# Patient Record
Sex: MALE | Race: WHITE | NOT HISPANIC OR LATINO | Employment: OTHER | URBAN - METROPOLITAN AREA
[De-identification: names, ages, dates, MRNs, and addresses within clinical notes are randomized per-mention and may not be internally consistent; named-entity substitution may affect disease eponyms.]

---

## 2023-02-22 LAB
ANION GAP IN SER/PLAS: 11 MMOL/L (ref 10–20)
CALCIUM (MG/DL) IN SER/PLAS: 9.2 MG/DL (ref 8.6–10.3)
CARBON DIOXIDE, TOTAL (MMOL/L) IN SER/PLAS: 31 MMOL/L (ref 21–32)
CHLORIDE (MMOL/L) IN SER/PLAS: 101 MMOL/L (ref 98–107)
CREATININE (MG/DL) IN SER/PLAS: 1.21 MG/DL (ref 0.5–1.3)
ERYTHROCYTE DISTRIBUTION WIDTH (RATIO) BY AUTOMATED COUNT: 16.9 % (ref 11.5–14.5)
ERYTHROCYTE MEAN CORPUSCULAR HEMOGLOBIN CONCENTRATION (G/DL) BY AUTOMATED: 30.9 G/DL (ref 32–36)
ERYTHROCYTE MEAN CORPUSCULAR VOLUME (FL) BY AUTOMATED COUNT: 82 FL (ref 80–100)
ERYTHROCYTES (10*6/UL) IN BLOOD BY AUTOMATED COUNT: 4.93 X10E12/L (ref 4.5–5.9)
GFR MALE: 63 ML/MIN/1.73M2
GLUCOSE (MG/DL) IN SER/PLAS: 116 MG/DL (ref 74–99)
HEMATOCRIT (%) IN BLOOD BY AUTOMATED COUNT: 40.4 % (ref 41–52)
HEMOGLOBIN (G/DL) IN BLOOD: 12.5 G/DL (ref 13.5–17.5)
INR IN PPP BY COAGULATION ASSAY: 1 (ref 0.9–1.1)
LEUKOCYTES (10*3/UL) IN BLOOD BY AUTOMATED COUNT: 4.5 X10E9/L (ref 4.4–11.3)
PLATELETS (10*3/UL) IN BLOOD AUTOMATED COUNT: 182 X10E9/L (ref 150–450)
POTASSIUM (MMOL/L) IN SER/PLAS: 4 MMOL/L (ref 3.5–5.3)
PROTHROMBIN TIME (PT) IN PPP BY COAGULATION ASSAY: 11.6 SEC (ref 9.8–13.4)
SODIUM (MMOL/L) IN SER/PLAS: 139 MMOL/L (ref 136–145)
UREA NITROGEN (MG/DL) IN SER/PLAS: 15 MG/DL (ref 6–23)

## 2023-03-10 LAB
ALANINE AMINOTRANSFERASE (SGPT) (U/L) IN SER/PLAS: 21 U/L (ref 10–52)
ANION GAP IN SER/PLAS: 12 MMOL/L (ref 10–20)
CALCIUM (MG/DL) IN SER/PLAS: 9.4 MG/DL (ref 8.6–10.3)
CARBON DIOXIDE, TOTAL (MMOL/L) IN SER/PLAS: 29 MMOL/L (ref 21–32)
CHLORIDE (MMOL/L) IN SER/PLAS: 102 MMOL/L (ref 98–107)
CHOLESTEROL (MG/DL) IN SER/PLAS: 146 MG/DL (ref 0–199)
CHOLESTEROL IN HDL (MG/DL) IN SER/PLAS: 39.4 MG/DL
CHOLESTEROL/HDL RATIO: 3.7
CREATININE (MG/DL) IN SER/PLAS: 1.24 MG/DL (ref 0.5–1.3)
ESTIMATED AVERAGE GLUCOSE FOR HBA1C: 140 MG/DL
GFR MALE: 61 ML/MIN/1.73M2
GLUCOSE (MG/DL) IN SER/PLAS: 103 MG/DL (ref 74–99)
HEMOGLOBIN A1C/HEMOGLOBIN TOTAL IN BLOOD: 6.5 %
LDL: 62 MG/DL (ref 0–99)
NON HDL CHOLESTEROL: 107 MG/DL
POTASSIUM (MMOL/L) IN SER/PLAS: 4.1 MMOL/L (ref 3.5–5.3)
SODIUM (MMOL/L) IN SER/PLAS: 139 MMOL/L (ref 136–145)
THYROTROPIN (MIU/L) IN SER/PLAS BY DETECTION LIMIT <= 0.05 MIU/L: 1.87 MIU/L (ref 0.44–3.98)
TRIGLYCERIDE (MG/DL) IN SER/PLAS: 225 MG/DL (ref 0–149)
URATE (MG/DL) IN SER/PLAS: 5.2 MG/DL (ref 4–7.5)
UREA NITROGEN (MG/DL) IN SER/PLAS: 20 MG/DL (ref 6–23)
VLDL: 45 MG/DL (ref 0–40)

## 2023-08-21 PROBLEM — Z86.010 HISTORY OF COLONIC POLYPS: Status: ACTIVE | Noted: 2023-08-21

## 2023-08-21 PROBLEM — H35.30 MACULAR DEGENERATION: Status: ACTIVE | Noted: 2023-08-21

## 2023-08-21 PROBLEM — E11.9 DIABETES MELLITUS WITHOUT COMPLICATION (MULTI): Status: ACTIVE | Noted: 2023-08-21

## 2023-08-21 PROBLEM — E78.2 MIXED HYPERLIPIDEMIA: Status: ACTIVE | Noted: 2023-08-21

## 2023-08-21 PROBLEM — M15.9 PRIMARY OSTEOARTHRITIS INVOLVING MULTIPLE JOINTS: Status: ACTIVE | Noted: 2023-08-21

## 2023-08-21 PROBLEM — R13.10 DYSPHAGIA: Status: ACTIVE | Noted: 2023-08-21

## 2023-08-21 PROBLEM — M15.0 PRIMARY OSTEOARTHRITIS INVOLVING MULTIPLE JOINTS: Status: ACTIVE | Noted: 2023-08-21

## 2023-08-21 PROBLEM — N40.0 BENIGN PROSTATIC HYPERPLASIA WITHOUT URINARY OBSTRUCTION: Status: ACTIVE | Noted: 2023-08-21

## 2023-08-21 PROBLEM — I10 ESSENTIAL HYPERTENSION: Status: ACTIVE | Noted: 2023-08-21

## 2023-08-21 PROBLEM — E11.65 HYPERGLYCEMIA DUE TO TYPE 2 DIABETES MELLITUS (MULTI): Status: ACTIVE | Noted: 2023-08-21

## 2023-08-21 PROBLEM — G47.33 OBSTRUCTIVE SLEEP APNEA SYNDROME: Status: ACTIVE | Noted: 2023-08-21

## 2023-08-21 PROBLEM — G62.9 PERIPHERAL POLYNEUROPATHY: Status: ACTIVE | Noted: 2023-08-21

## 2023-08-21 PROBLEM — I35.0 AORTIC STENOSIS: Status: ACTIVE | Noted: 2023-08-21

## 2023-08-21 PROBLEM — E03.9 HYPOTHYROIDISM: Status: ACTIVE | Noted: 2023-08-21

## 2023-08-21 PROBLEM — K21.9 GASTROESOPHAGEAL REFLUX DISEASE WITHOUT ESOPHAGITIS: Status: ACTIVE | Noted: 2023-08-21

## 2023-08-21 PROBLEM — I38 VALVULAR HEART DISEASE: Status: ACTIVE | Noted: 2023-08-21

## 2023-08-21 PROBLEM — E66.01 MORBID OBESITY (MULTI): Status: ACTIVE | Noted: 2023-08-21

## 2023-08-21 PROBLEM — M46.92 CERVICAL SPONDYLITIS (CMS-HCC): Status: ACTIVE | Noted: 2023-08-21

## 2023-08-21 PROBLEM — M19.90 DEGENERATIVE JOINT DISEASE: Status: ACTIVE | Noted: 2023-08-21

## 2023-08-21 PROBLEM — E11.40 DIABETIC NEUROPATHY (MULTI): Status: ACTIVE | Noted: 2023-08-21

## 2023-08-21 PROBLEM — Z86.0100 HISTORY OF COLONIC POLYPS: Status: ACTIVE | Noted: 2023-08-21

## 2023-08-21 PROBLEM — M10.9 GOUT: Status: ACTIVE | Noted: 2023-08-21

## 2023-08-21 RX ORDER — ACETAMINOPHEN 325 MG/1
2 TABLET ORAL EVERY 6 HOURS PRN
COMMUNITY
Start: 2023-02-18 | End: 2024-03-21 | Stop reason: SDUPTHER

## 2023-08-21 RX ORDER — ALLOPURINOL 300 MG/1
1 TABLET ORAL DAILY
COMMUNITY
End: 2024-03-21 | Stop reason: SDUPTHER

## 2023-08-21 RX ORDER — OMEPRAZOLE 40 MG/1
1 CAPSULE, DELAYED RELEASE ORAL
COMMUNITY
End: 2024-03-21 | Stop reason: SDUPTHER

## 2023-08-21 RX ORDER — SEMAGLUTIDE 0.68 MG/ML
INJECTION, SOLUTION SUBCUTANEOUS
COMMUNITY
Start: 2023-04-19 | End: 2023-11-14 | Stop reason: ALTCHOICE

## 2023-08-21 RX ORDER — NAPROXEN SODIUM 220 MG/1
1 TABLET, FILM COATED ORAL DAILY
COMMUNITY
End: 2024-03-21 | Stop reason: SDUPTHER

## 2023-08-21 RX ORDER — CHOLECALCIFEROL (VITAMIN D3) 125 MCG
1 CAPSULE ORAL 2 TIMES DAILY PRN
COMMUNITY
End: 2024-02-12 | Stop reason: ALTCHOICE

## 2023-08-21 RX ORDER — HYDROCHLOROTHIAZIDE 12.5 MG/1
1 TABLET ORAL DAILY
COMMUNITY
End: 2024-03-21 | Stop reason: SDUPTHER

## 2023-08-21 RX ORDER — METFORMIN HYDROCHLORIDE 500 MG/1
1 TABLET, EXTENDED RELEASE ORAL DAILY
COMMUNITY
Start: 2013-06-17 | End: 2024-03-21 | Stop reason: SDUPTHER

## 2023-08-21 RX ORDER — LEVOTHYROXINE SODIUM 150 UG/1
1 TABLET ORAL
COMMUNITY
Start: 2020-07-10 | End: 2024-03-21 | Stop reason: SDUPTHER

## 2023-08-21 RX ORDER — NIFEDIPINE 60 MG/1
1 TABLET, FILM COATED, EXTENDED RELEASE ORAL DAILY
COMMUNITY
Start: 2013-06-17 | End: 2024-03-21 | Stop reason: SDUPTHER

## 2023-08-21 RX ORDER — HYDROCHLOROTHIAZIDE 25 MG/1
1 TABLET ORAL DAILY
COMMUNITY
End: 2023-11-14 | Stop reason: ALTCHOICE

## 2023-08-21 RX ORDER — DIPHENHYDRAMINE HCL 50 MG
1 CAPSULE ORAL NIGHTLY
COMMUNITY
End: 2024-03-21 | Stop reason: ALTCHOICE

## 2023-08-21 RX ORDER — CARBOXYMETHYLCELLULOSE SODIUM 10 MG/ML
1 GEL OPHTHALMIC 2 TIMES DAILY
COMMUNITY
End: 2024-02-29 | Stop reason: WASHOUT

## 2023-08-21 RX ORDER — SIMVASTATIN 10 MG/1
1 TABLET, FILM COATED ORAL DAILY
COMMUNITY
End: 2024-03-21 | Stop reason: SDUPTHER

## 2023-09-14 ENCOUNTER — HOSPITAL ENCOUNTER (OUTPATIENT)
Dept: DATA CONVERSION | Facility: HOSPITAL | Age: 73
Discharge: HOME | End: 2023-09-14
Payer: MEDICARE

## 2023-09-14 DIAGNOSIS — N40.0 BENIGN PROSTATIC HYPERPLASIA WITHOUT LOWER URINARY TRACT SYMPTOMS: ICD-10-CM

## 2023-09-14 DIAGNOSIS — E03.9 HYPOTHYROIDISM, UNSPECIFIED: ICD-10-CM

## 2023-09-14 DIAGNOSIS — Z01.89 ENCOUNTER FOR OTHER SPECIFIED SPECIAL EXAMINATIONS: ICD-10-CM

## 2023-09-14 DIAGNOSIS — E55.9 VITAMIN D DEFICIENCY, UNSPECIFIED: ICD-10-CM

## 2023-09-14 DIAGNOSIS — E78.2 MIXED HYPERLIPIDEMIA: ICD-10-CM

## 2023-09-14 DIAGNOSIS — Z87.39 PERSONAL HISTORY OF OTHER DISEASES OF THE MUSCULOSKELETAL SYSTEM AND CONNECTIVE TISSUE: ICD-10-CM

## 2023-09-14 DIAGNOSIS — E11.65 TYPE 2 DIABETES MELLITUS WITH HYPERGLYCEMIA (MULTI): ICD-10-CM

## 2023-09-14 LAB
25(OH)D3 SERPL-MCNC: 24 NG/ML (ref 31–100)
ALBUMIN SERPL-MCNC: 4.3 GM/DL (ref 3.5–5)
ALBUMIN/GLOB SERPL: 1.6 RATIO (ref 1.5–3)
ALP BLD-CCNC: 102 U/L (ref 35–125)
ALT SERPL-CCNC: 20 U/L (ref 5–40)
ANION GAP SERPL CALCULATED.3IONS-SCNC: 12 MMOL/L (ref 0–19)
APPEARANCE PLAS: CLEAR
AST SERPL-CCNC: 23 U/L (ref 5–40)
BASOPHILS # BLD AUTO: 0.05 K/UL (ref 0–0.22)
BASOPHILS NFR BLD AUTO: 0.9 % (ref 0–1)
BILIRUB DIRECT SERPL-MCNC: 0.2 MG/DL (ref 0–0.2)
BILIRUB INDIRECT SERPL-MCNC: 0.3 MG/DL (ref 0–0.8)
BILIRUB SERPL-MCNC: 0.5 MG/DL (ref 0.1–1.2)
BUN SERPL-MCNC: 18 MG/DL (ref 8–25)
BUN/CREAT SERPL: 15 RATIO (ref 8–21)
CALCIUM SERPL-MCNC: 9.7 MG/DL (ref 8.5–10.4)
CHLORIDE SERPL-SCNC: 104 MMOL/L (ref 97–107)
CHOLEST SERPL-MCNC: 158 MG/DL (ref 133–200)
CHOLEST/HDLC SERPL: 3.9 RATIO
CO2 SERPL-SCNC: 27 MMOL/L (ref 24–31)
COLOR SPUN FLD: YELLOW
CREAT SERPL-MCNC: 1.2 MG/DL (ref 0.4–1.6)
CREAT UR-MCNC: 43.6 MG/DL
DEPRECATED RDW RBC AUTO: 45.1 FL (ref 37–54)
DIFFERENTIAL METHOD BLD: ABNORMAL
EOSINOPHIL # BLD AUTO: 0.15 K/UL (ref 0–0.45)
EOSINOPHIL NFR BLD: 2.8 % (ref 0–3)
ERYTHROCYTE [DISTWIDTH] IN BLOOD BY AUTOMATED COUNT: 14.7 % (ref 11.7–15)
FASTING STATUS PATIENT QL REPORTED: ABNORMAL
GFR SERPL CREATININE-BSD FRML MDRD: 64 ML/MIN/1.73 M2
GLOBULIN SER-MCNC: 2.7 G/DL (ref 1.9–3.7)
GLUCOSE SERPL-MCNC: 119 MG/DL (ref 65–99)
HBA1C MFR BLD: 6.5 % (ref 4–6)
HCT VFR BLD AUTO: 43.8 % (ref 41–50)
HDLC SERPL-MCNC: 41 MG/DL
HGB BLD-MCNC: 14.2 GM/DL (ref 13.5–16.5)
IMM GRANULOCYTES # BLD AUTO: 0.01 K/UL (ref 0–0.1)
LDLC SERPL CALC-MCNC: 84 MG/DL (ref 65–130)
LYMPHOCYTES # BLD AUTO: 0.74 K/UL (ref 1.2–3.2)
LYMPHOCYTES NFR BLD MANUAL: 13.7 % (ref 20–40)
MCH RBC QN AUTO: 26.9 PG (ref 26–34)
MCHC RBC AUTO-ENTMCNC: 32.4 % (ref 31–37)
MCV RBC AUTO: 83.1 FL (ref 80–100)
MICROALBUMIN UR-MCNC: 12 MG/L (ref 0–23)
MICROALBUMIN/CREAT UR: 27.5 MG/G (ref 0–30)
MONOCYTES # BLD AUTO: 0.65 K/UL (ref 0–0.8)
MONOCYTES NFR BLD MANUAL: 12 % (ref 0–8)
NEUTROPHILS # BLD AUTO: 3.82 K/UL
NEUTROPHILS # BLD AUTO: 3.82 K/UL (ref 1.8–7.7)
NEUTROPHILS.IMMATURE NFR BLD: 0.2 % (ref 0–1)
NEUTS SEG NFR BLD: 70.4 % (ref 50–70)
NRBC BLD-RTO: 0 /100 WBC
PLATELET # BLD AUTO: 194 K/UL (ref 150–450)
PMV BLD AUTO: 10.5 CU (ref 7–12.6)
POTASSIUM SERPL-SCNC: 3.8 MMOL/L (ref 3.4–5.1)
PROT SERPL-MCNC: 7 G/DL (ref 5.9–7.9)
PSA SERPL-MCNC: 6.1 NG/ML (ref 0–4.1)
RBC # BLD AUTO: 5.27 M/UL (ref 4.5–5.5)
SODIUM SERPL-SCNC: 143 MMOL/L (ref 133–145)
TRIGL SERPL-MCNC: 165 MG/DL (ref 40–150)
TSH SERPL DL<=0.05 MIU/L-ACNC: 0.4 MIU/L (ref 0.27–4.2)
URATE SERPL-MCNC: 5.2 MG/DL (ref 3.6–7.7)
WBC # BLD AUTO: 5.4 K/UL (ref 4.5–11)

## 2023-10-16 ENCOUNTER — LAB (OUTPATIENT)
Dept: LAB | Facility: LAB | Age: 73
End: 2023-10-16
Payer: MEDICARE

## 2023-10-16 DIAGNOSIS — R97.20 ELEVATED PROSTATE SPECIFIC ANTIGEN (PSA): Primary | ICD-10-CM

## 2023-10-16 PROCEDURE — 84153 ASSAY OF PSA TOTAL: CPT

## 2023-10-16 PROCEDURE — 36415 COLL VENOUS BLD VENIPUNCTURE: CPT

## 2023-10-17 LAB — PSA SERPL-MCNC: 6.43 NG/ML

## 2023-11-14 ENCOUNTER — OFFICE VISIT (OUTPATIENT)
Dept: ORTHOPEDIC SURGERY | Facility: CLINIC | Age: 73
End: 2023-11-14
Payer: MEDICARE

## 2023-11-14 DIAGNOSIS — M17.12 ARTHRITIS OF LEFT KNEE: Primary | ICD-10-CM

## 2023-11-14 RX ORDER — TRIAMCINOLONE ACETONIDE 40 MG/ML
40 INJECTION, SUSPENSION INTRA-ARTICULAR; INTRAMUSCULAR
Status: COMPLETED | OUTPATIENT
Start: 2023-11-14 | End: 2023-11-14

## 2023-11-14 RX ORDER — BUPIVACAINE HYDROCHLORIDE 2.5 MG/ML
1 INJECTION, SOLUTION INFILTRATION; PERINEURAL
Status: COMPLETED | OUTPATIENT
Start: 2023-11-14 | End: 2023-11-14

## 2023-11-14 RX ADMIN — TRIAMCINOLONE ACETONIDE 40 MG: 40 INJECTION, SUSPENSION INTRA-ARTICULAR; INTRAMUSCULAR at 08:48

## 2023-11-14 RX ADMIN — BUPIVACAINE HYDROCHLORIDE 1 ML: 2.5 INJECTION, SOLUTION INFILTRATION; PERINEURAL at 08:48

## 2023-11-14 ASSESSMENT — ENCOUNTER SYMPTOMS
DEPRESSION: 0
OCCASIONAL FEELINGS OF UNSTEADINESS: 1
LOSS OF SENSATION IN FEET: 0
KNEE DEFORMITY: 1

## 2023-11-14 ASSESSMENT — PATIENT HEALTH QUESTIONNAIRE - PHQ9
SUM OF ALL RESPONSES TO PHQ9 QUESTIONS 1 & 2: 0
2. FEELING DOWN, DEPRESSED OR HOPELESS: NOT AT ALL
1. LITTLE INTEREST OR PLEASURE IN DOING THINGS: NOT AT ALL

## 2023-11-14 ASSESSMENT — PAIN - FUNCTIONAL ASSESSMENT: PAIN_FUNCTIONAL_ASSESSMENT: 0-10

## 2023-11-14 ASSESSMENT — PAIN DESCRIPTION - DESCRIPTORS: DESCRIPTORS: ACHING;DISCOMFORT

## 2023-11-14 ASSESSMENT — PAIN SCALES - GENERAL: PAINLEVEL_OUTOF10: 8

## 2023-11-14 NOTE — PROGRESS NOTES
Subjective    Patient ID: Jerald Marks is a 73 y.o. male.    Chief Complaint: Pain of the Left Knee (OSTEOARTHRITIS, REQUESTING INJECTION, LAST INJECTION 6/29/2023)         Left Knee       This 73-year-old male is complaining of left knee pain.  He has a history of degenerative arthritis involving the medial compartment of the left knee.  He has had a previous injection in June 2023 which helped him significantly.  He is here requesting another injection.  He denies any interim issues.  He apparently is prediabetic and I did discuss the implications of steroid injections.  Objective   Ortho Exam  On examination he is noted to be a somewhat overweight male.  He is walking with no assistive devices.  Examination of the left knee reveals there to be minimal effusion.  There is pain to palpation over the medial joint line.  There is no varus or valgus instability noted.  He has good range of motion with full extension and flexion to 120 degrees.  Image Results:  XR knee left 1-2 views  PROCEDURE:         KNEE LT 1 OR 2 VIEW - OXR  0181  REASON FOR EXAM: pain    RESULT: MRN: [id number]  Patient Name: JERALD MARKS    STUDY:  KNEE LT 1 OR 2 VIEW; 6/29/2023 9:12 am    INDICATION:  pain.    COMPARISON:  None    ACCESSION NUMBER(S):  [id number]    ORDERING CLINICIAN:  MAKSIM HORNE    TECHNIQUE:  Two views .    FINDINGS:  No fracture or dislocation. There is a small suprapatellar effusion. A  fabella is noted. There is no periostitis. No abnormal sclerosis is seen.  No erosions. No suggestion of osteopenia. Mild-to-moderate quadriceps  enthesopathy and origin and insertion patellar enthesopathy is seen. Mild  surface osteophytosis of the patella is seen. There is mild medial  compartment narrowing with minimal osteophytosis and minimal lateral  compartment narrowing with lateral tibial plateau osteophytosis. Soft tissue  edema anterior to the patella and patellar tendon is seen.    IMPRESSION:  Degenerative changes of the knee  without acute osseous abnormality.  Dictation workstation:   [id number]    Original Interpreting Physician:   GINA MELARA MD  Original Transcribed by/Date: MMODAL Jun 29 2023  9:05A  Original Electronically Signed by/Date: GINA MELARA MD Jun 29 2023 10:13A    Addendum Interpreting Physician:  Addendum Transcribed by/Date: NO ADDENDUM  Addendum Electronically Signed by/Date:  ECHOCARDIOGRAM  Ordered by an unspecified provider.      Assessment/Plan   Encounter Diagnoses:  Arthritis of left knee        Patient ID: Jerald Sandoval is a 73 y.o. male.    L Inj/Asp: L knee on 11/14/2023 8:48 AM  Indications: pain  Details: 25 G needle, anteromedial approach  Medications: 40 mg triamcinolone acetonide 40 mg/mL; 1 mL BUPivacaine HCl 0.25 % (2.5 mg/mL)  Outcome: tolerated well, no immediate complications  Procedure, treatment alternatives, risks and benefits explained, specific risks discussed. Consent was given by the patient. Immediately prior to procedure a time out was called to verify the correct patient, procedure, equipment, support staff and site/side marked as required. Patient was prepped and draped in the usual sterile fashion.       I recommended low impact exercise as tolerated.  I again discussed the fact that we could do these injections every 3 months if needed.  He is to return on an as-needed basis.

## 2023-11-24 ENCOUNTER — LAB (OUTPATIENT)
Dept: LAB | Facility: LAB | Age: 73
End: 2023-11-24
Payer: MEDICARE

## 2023-11-24 DIAGNOSIS — R97.20 ELEVATED PROSTATE SPECIFIC ANTIGEN (PSA): Primary | ICD-10-CM

## 2023-11-24 DIAGNOSIS — R97.20 ELEVATED PROSTATE SPECIFIC ANTIGEN (PSA): ICD-10-CM

## 2023-11-24 LAB
ANION GAP SERPL CALC-SCNC: 11 MMOL/L (ref 10–20)
BUN SERPL-MCNC: 19 MG/DL (ref 6–23)
CALCIUM SERPL-MCNC: 9.7 MG/DL (ref 8.6–10.3)
CHLORIDE SERPL-SCNC: 103 MMOL/L (ref 98–107)
CO2 SERPL-SCNC: 30 MMOL/L (ref 21–32)
CREAT SERPL-MCNC: 1.24 MG/DL (ref 0.5–1.3)
GFR SERPL CREATININE-BSD FRML MDRD: 61 ML/MIN/1.73M*2
GLUCOSE SERPL-MCNC: 110 MG/DL (ref 74–99)
POTASSIUM SERPL-SCNC: 4.2 MMOL/L (ref 3.5–5.3)
SODIUM SERPL-SCNC: 140 MMOL/L (ref 136–145)

## 2023-11-24 PROCEDURE — 36415 COLL VENOUS BLD VENIPUNCTURE: CPT

## 2023-11-24 PROCEDURE — 80048 BASIC METABOLIC PNL TOTAL CA: CPT

## 2023-11-27 ENCOUNTER — OFFICE VISIT (OUTPATIENT)
Dept: CARDIOLOGY | Facility: CLINIC | Age: 73
End: 2023-11-27
Payer: MEDICARE

## 2023-11-27 VITALS
SYSTOLIC BLOOD PRESSURE: 136 MMHG | OXYGEN SATURATION: 96 % | WEIGHT: 292.8 LBS | BODY MASS INDEX: 42.01 KG/M2 | DIASTOLIC BLOOD PRESSURE: 66 MMHG | HEART RATE: 86 BPM

## 2023-11-27 DIAGNOSIS — I10 ESSENTIAL HYPERTENSION: ICD-10-CM

## 2023-11-27 DIAGNOSIS — N40.0 BENIGN PROSTATIC HYPERPLASIA WITHOUT URINARY OBSTRUCTION: ICD-10-CM

## 2023-11-27 DIAGNOSIS — E78.2 MIXED HYPERLIPIDEMIA: ICD-10-CM

## 2023-11-27 DIAGNOSIS — I38 VALVULAR HEART DISEASE: Primary | ICD-10-CM

## 2023-11-27 PROBLEM — Z95.2 HISTORY OF TRANSCATHETER AORTIC VALVE REPLACEMENT (TAVR): Status: ACTIVE | Noted: 2023-11-27

## 2023-11-27 PROCEDURE — 99213 OFFICE O/P EST LOW 20 MIN: CPT | Performed by: INTERNAL MEDICINE

## 2023-11-27 PROCEDURE — 1159F MED LIST DOCD IN RCRD: CPT | Performed by: INTERNAL MEDICINE

## 2023-11-27 PROCEDURE — 1036F TOBACCO NON-USER: CPT | Performed by: INTERNAL MEDICINE

## 2023-11-27 PROCEDURE — 3075F SYST BP GE 130 - 139MM HG: CPT | Performed by: INTERNAL MEDICINE

## 2023-11-27 PROCEDURE — 3008F BODY MASS INDEX DOCD: CPT | Performed by: INTERNAL MEDICINE

## 2023-11-27 PROCEDURE — 1126F AMNT PAIN NOTED NONE PRSNT: CPT | Performed by: INTERNAL MEDICINE

## 2023-11-27 PROCEDURE — 3044F HG A1C LEVEL LT 7.0%: CPT | Performed by: INTERNAL MEDICINE

## 2023-11-27 PROCEDURE — 3078F DIAST BP <80 MM HG: CPT | Performed by: INTERNAL MEDICINE

## 2023-11-27 PROCEDURE — 1160F RVW MEDS BY RX/DR IN RCRD: CPT | Performed by: INTERNAL MEDICINE

## 2023-11-27 ASSESSMENT — PAIN SCALES - GENERAL: PAINLEVEL: 0-NO PAIN

## 2023-11-27 ASSESSMENT — ENCOUNTER SYMPTOMS
ENDOCRINE NEGATIVE: 1
FREQUENCY: 1
RESPIRATORY NEGATIVE: 1
MUSCULOSKELETAL NEGATIVE: 1
CONSTITUTIONAL NEGATIVE: 1
GASTROINTESTINAL NEGATIVE: 1
EYES NEGATIVE: 1
NEUROLOGICAL NEGATIVE: 1

## 2023-11-27 NOTE — PROGRESS NOTES
Subjective      Chief Complaint   Patient presents with    Follow-up    Cardiac Valve Problem          He has a history of having a TAVR in February 2023.  Last echocardiogram showed normal ejection fraction.  He is feeling well and is active.  He is not complaining of chest discomfort.  NO PND or orthopnea.  He does not complain of palpitations. The legs will swell and are better in the morning. He says his energy level is about the same as before the TAVR.  The BP is doing well  The chol was done in Sept and is low           Review of Systems   Constitutional: Negative.   HENT: Negative.     Eyes: Negative.    Respiratory: Negative.     Endocrine: Negative.    Skin: Negative.    Musculoskeletal: Negative.    Gastrointestinal: Negative.    Genitourinary:  Positive for frequency.   Neurological: Negative.    All other systems reviewed and are negative.       History reviewed. No pertinent surgical history.     Active Ambulatory Problems     Diagnosis Date Noted    Aortic stenosis 08/21/2023    Benign prostatic hyperplasia without urinary obstruction 08/21/2023    Cervical spondylitis (CMS/HCC) 08/21/2023    Degenerative joint disease 08/21/2023    Diabetes mellitus without complication (CMS/HCC) 08/21/2023    Diabetic neuropathy (CMS/HCC) 08/21/2023    Dysphagia 08/21/2023    Essential hypertension 08/21/2023    Gastroesophageal reflux disease without esophagitis 08/21/2023    Gout 08/21/2023    History of colonic polyps 08/21/2023    Hyperglycemia due to type 2 diabetes mellitus (CMS/HCC) 08/21/2023    Hypothyroidism 08/21/2023    Macular degeneration 08/21/2023    Mixed hyperlipidemia 08/21/2023    Morbid obesity (CMS/HCC) 08/21/2023    Peripheral polyneuropathy 08/21/2023    Primary osteoarthritis involving multiple joints 08/21/2023    Obstructive sleep apnea syndrome 08/21/2023    Valvular heart disease 08/21/2023    History of transcatheter aortic valve replacement (TAVR) 11/27/2023     Resolved Ambulatory  "Problems     Diagnosis Date Noted    No Resolved Ambulatory Problems     No Additional Past Medical History        Visit Vitals  /66   Pulse 86   Wt 133 kg (292 lb 12.8 oz)   SpO2 96%   BMI 42.01 kg/m²   Smoking Status Never   BSA 2.56 m²        Objective     Constitutional:       Appearance: Healthy appearance.   Eyes:      Pupils: Pupils are equal, round, and reactive to light.   Neck:      Vascular: JVD normal.   Pulmonary:      Breath sounds: Normal breath sounds.   Cardiovascular:      PMI at left midclavicular line. Normal rate. Regular rhythm. Normal S1. Normal S2.       Murmurs: There is a grade 1/6 systolic murmur.      No gallop.  No click. No rub.   Pulses:     Intact distal pulses.   Edema:     Peripheral edema absent.   Abdominal:      Palpations: Abdomen is soft.      Tenderness: There is no abdominal tenderness.   Musculoskeletal:      Extremities: No clubbing present.Skin:     General: Skin is warm and dry.   Neurological:      General: No focal deficit present.            Lab Review:         Lab Results   Component Value Date    CHOL 158 09/14/2023    CHOL 146 03/10/2023    CHOL 164 09/06/2022     Lab Results   Component Value Date    HDL 41 09/14/2023    HDL 39.4 (A) 03/10/2023    HDL 44 09/06/2022     Lab Results   Component Value Date    LDLCALC 84 09/14/2023    LDLCALC 77 09/06/2022    LDLCALC 81 02/28/2022     Lab Results   Component Value Date    TRIG 165 (H) 09/14/2023    TRIG 225 (H) 03/10/2023    TRIG 214 (H) 09/06/2022     No components found for: \"CHOLHDL\"     Assessment/Plan     Valvular heart disease  TAVR is doing well and no angina and no CHF.  The valve was replace in Feb and is doing well.    Essential hypertension  The chol is doing well    Mixed hyperlipidemia  Has been controlled    Benign prostatic hyperplasia without urinary obstruction  The PSA is up and will have an MRI by Carlos next week     "

## 2023-11-29 DIAGNOSIS — R97.20 ELEVATED PROSTATE SPECIFIC ANTIGEN (PSA): Primary | ICD-10-CM

## 2023-12-01 ENCOUNTER — HOSPITAL ENCOUNTER (OUTPATIENT)
Dept: RADIOLOGY | Facility: HOSPITAL | Age: 73
Discharge: HOME | End: 2023-12-01
Payer: MEDICARE

## 2023-12-01 DIAGNOSIS — Z03.823 ENCOUNTER FOR OBSERVATION FOR SUSPECTED INSERTED (INJECTED) FOREIGN BODY RULED OUT: ICD-10-CM

## 2023-12-01 DIAGNOSIS — R97.20 ELEVATED PROSTATE SPECIFIC ANTIGEN (PSA): ICD-10-CM

## 2023-12-01 PROCEDURE — 2550000001 HC RX 255 CONTRASTS: Performed by: UROLOGY

## 2023-12-01 PROCEDURE — 72197 MRI PELVIS W/O & W/DYE: CPT

## 2023-12-01 PROCEDURE — A9575 INJ GADOTERATE MEGLUMI 0.1ML: HCPCS | Performed by: UROLOGY

## 2023-12-01 PROCEDURE — 73552 X-RAY EXAM OF FEMUR 2/>: CPT | Mod: RT

## 2023-12-01 RX ORDER — GADOTERATE MEGLUMINE 376.9 MG/ML
28 INJECTION INTRAVENOUS
Status: COMPLETED | OUTPATIENT
Start: 2023-12-01 | End: 2023-12-01

## 2023-12-01 RX ADMIN — GADOTERATE MEGLUMINE 28 ML: 376.9 INJECTION INTRAVENOUS at 11:56

## 2023-12-27 ENCOUNTER — ANESTHESIA EVENT (OUTPATIENT)
Dept: OPERATING ROOM | Facility: HOSPITAL | Age: 73
End: 2023-12-27
Payer: MEDICARE

## 2023-12-29 ENCOUNTER — HOSPITAL ENCOUNTER (OUTPATIENT)
Facility: HOSPITAL | Age: 73
Setting detail: OUTPATIENT SURGERY
Discharge: HOME | End: 2023-12-29
Attending: UROLOGY | Admitting: UROLOGY
Payer: MEDICARE

## 2023-12-29 ENCOUNTER — ANESTHESIA (OUTPATIENT)
Dept: OPERATING ROOM | Facility: HOSPITAL | Age: 73
End: 2023-12-29
Payer: MEDICARE

## 2023-12-29 VITALS
DIASTOLIC BLOOD PRESSURE: 67 MMHG | HEART RATE: 75 BPM | RESPIRATION RATE: 15 BRPM | WEIGHT: 300.49 LBS | HEIGHT: 70 IN | BODY MASS INDEX: 43.02 KG/M2 | TEMPERATURE: 96.8 F | SYSTOLIC BLOOD PRESSURE: 121 MMHG | OXYGEN SATURATION: 99 %

## 2023-12-29 DIAGNOSIS — R97.20 ELEVATED PSA: Primary | ICD-10-CM

## 2023-12-29 PROBLEM — K27.9 PUD (PEPTIC ULCER DISEASE): Status: ACTIVE | Noted: 2023-12-29

## 2023-12-29 LAB — GLUCOSE BLD MANUAL STRIP-MCNC: 118 MG/DL (ref 74–99)

## 2023-12-29 PROCEDURE — 82947 ASSAY GLUCOSE BLOOD QUANT: CPT

## 2023-12-29 PROCEDURE — G0416 PROSTATE BIOPSY, ANY MTHD: HCPCS | Mod: TC,BEALAB,WESLAB | Performed by: UROLOGY

## 2023-12-29 PROCEDURE — G0416 PROSTATE BIOPSY, ANY MTHD: HCPCS | Performed by: PATHOLOGY

## 2023-12-29 PROCEDURE — 7100000009 HC PHASE TWO TIME - INITIAL BASE CHARGE: Performed by: UROLOGY

## 2023-12-29 PROCEDURE — 2500000005 HC RX 250 GENERAL PHARMACY W/O HCPCS: Performed by: ANESTHESIOLOGY

## 2023-12-29 PROCEDURE — 3700000001 HC GENERAL ANESTHESIA TIME - INITIAL BASE CHARGE: Performed by: UROLOGY

## 2023-12-29 PROCEDURE — 2720000007 HC OR 272 NO HCPCS: Performed by: UROLOGY

## 2023-12-29 PROCEDURE — 88305 TISSUE EXAM BY PATHOLOGIST: CPT | Mod: TC,SUR,BEALAB,WESLAB | Performed by: UROLOGY

## 2023-12-29 PROCEDURE — 7100000002 HC RECOVERY ROOM TIME - EACH INCREMENTAL 1 MINUTE: Performed by: UROLOGY

## 2023-12-29 PROCEDURE — 3600000004 HC OR TIME - INITIAL BASE CHARGE - PROCEDURE LEVEL FOUR: Performed by: UROLOGY

## 2023-12-29 PROCEDURE — 2500000004 HC RX 250 GENERAL PHARMACY W/ HCPCS (ALT 636 FOR OP/ED): Performed by: ANESTHESIOLOGY

## 2023-12-29 PROCEDURE — 3700000002 HC GENERAL ANESTHESIA TIME - EACH INCREMENTAL 1 MINUTE: Performed by: UROLOGY

## 2023-12-29 PROCEDURE — 88344 IMHCHEM/IMCYTCHM EA MLT ANTB: CPT | Mod: TC,SUR,BEALAB,WESLAB | Performed by: UROLOGY

## 2023-12-29 PROCEDURE — 2500000004 HC RX 250 GENERAL PHARMACY W/ HCPCS (ALT 636 FOR OP/ED): Performed by: UROLOGY

## 2023-12-29 PROCEDURE — 88344 IMHCHEM/IMCYTCHM EA MLT ANTB: CPT | Performed by: PATHOLOGY

## 2023-12-29 PROCEDURE — 7100000001 HC RECOVERY ROOM TIME - INITIAL BASE CHARGE: Performed by: UROLOGY

## 2023-12-29 PROCEDURE — 7100000010 HC PHASE TWO TIME - EACH INCREMENTAL 1 MINUTE: Performed by: UROLOGY

## 2023-12-29 PROCEDURE — 3600000009 HC OR TIME - EACH INCREMENTAL 1 MINUTE - PROCEDURE LEVEL FOUR: Performed by: UROLOGY

## 2023-12-29 RX ORDER — FENTANYL CITRATE 50 UG/ML
50 INJECTION, SOLUTION INTRAMUSCULAR; INTRAVENOUS EVERY 5 MIN PRN
Status: DISCONTINUED | OUTPATIENT
Start: 2023-12-29 | End: 2023-12-29 | Stop reason: HOSPADM

## 2023-12-29 RX ORDER — CIPROFLOXACIN 2 MG/ML
400 INJECTION, SOLUTION INTRAVENOUS ONCE
Status: COMPLETED | OUTPATIENT
Start: 2023-12-29 | End: 2023-12-29

## 2023-12-29 RX ORDER — PROPOFOL 10 MG/ML
INJECTION, EMULSION INTRAVENOUS AS NEEDED
Status: DISCONTINUED | OUTPATIENT
Start: 2023-12-29 | End: 2023-12-29

## 2023-12-29 RX ORDER — ONDANSETRON HYDROCHLORIDE 2 MG/ML
INJECTION, SOLUTION INTRAVENOUS AS NEEDED
Status: DISCONTINUED | OUTPATIENT
Start: 2023-12-29 | End: 2023-12-29

## 2023-12-29 RX ORDER — FENTANYL CITRATE 50 UG/ML
INJECTION, SOLUTION INTRAMUSCULAR; INTRAVENOUS AS NEEDED
Status: DISCONTINUED | OUTPATIENT
Start: 2023-12-29 | End: 2023-12-29

## 2023-12-29 RX ORDER — FENTANYL CITRATE 50 UG/ML
25 INJECTION, SOLUTION INTRAMUSCULAR; INTRAVENOUS EVERY 5 MIN PRN
Status: DISCONTINUED | OUTPATIENT
Start: 2023-12-29 | End: 2023-12-29 | Stop reason: HOSPADM

## 2023-12-29 RX ORDER — LIDOCAINE HYDROCHLORIDE 10 MG/ML
INJECTION, SOLUTION EPIDURAL; INFILTRATION; INTRACAUDAL; PERINEURAL AS NEEDED
Status: DISCONTINUED | OUTPATIENT
Start: 2023-12-29 | End: 2023-12-29

## 2023-12-29 RX ORDER — MIDAZOLAM HYDROCHLORIDE 1 MG/ML
INJECTION, SOLUTION INTRAMUSCULAR; INTRAVENOUS AS NEEDED
Status: DISCONTINUED | OUTPATIENT
Start: 2023-12-29 | End: 2023-12-29

## 2023-12-29 RX ORDER — SULFAMETHOXAZOLE AND TRIMETHOPRIM 800; 160 MG/1; MG/1
1 TABLET ORAL 2 TIMES DAILY
Qty: 4 TABLET | Refills: 0 | Status: SHIPPED | OUTPATIENT
Start: 2023-12-29 | End: 2024-02-12 | Stop reason: ALTCHOICE

## 2023-12-29 RX ORDER — SODIUM CHLORIDE, SODIUM LACTATE, POTASSIUM CHLORIDE, CALCIUM CHLORIDE 600; 310; 30; 20 MG/100ML; MG/100ML; MG/100ML; MG/100ML
100 INJECTION, SOLUTION INTRAVENOUS CONTINUOUS
Status: DISCONTINUED | OUTPATIENT
Start: 2023-12-29 | End: 2023-12-29 | Stop reason: HOSPADM

## 2023-12-29 RX ADMIN — FENTANYL CITRATE 50 MCG: 50 INJECTION INTRAMUSCULAR; INTRAVENOUS at 08:34

## 2023-12-29 RX ADMIN — ONDANSETRON 4 MG: 2 INJECTION, SOLUTION INTRAMUSCULAR; INTRAVENOUS at 08:35

## 2023-12-29 RX ADMIN — PROPOFOL 25 MG: 10 INJECTION, EMULSION INTRAVENOUS at 08:39

## 2023-12-29 RX ADMIN — LIDOCAINE HYDROCHLORIDE 5 ML: 10 INJECTION, SOLUTION EPIDURAL; INFILTRATION; INTRACAUDAL; PERINEURAL at 08:30

## 2023-12-29 RX ADMIN — CIPROFLOXACIN: 2 INJECTION, SOLUTION INTRAVENOUS at 08:18

## 2023-12-29 RX ADMIN — PROPOFOL 50 MG: 10 INJECTION, EMULSION INTRAVENOUS at 08:35

## 2023-12-29 RX ADMIN — MIDAZOLAM 1 MG: 1 INJECTION INTRAMUSCULAR; INTRAVENOUS at 08:20

## 2023-12-29 RX ADMIN — SODIUM CHLORIDE, SODIUM LACTATE, POTASSIUM CHLORIDE, AND CALCIUM CHLORIDE: 600; 310; 30; 20 INJECTION, SOLUTION INTRAVENOUS at 08:18

## 2023-12-29 RX ADMIN — FENTANYL CITRATE 50 MCG: 50 INJECTION INTRAMUSCULAR; INTRAVENOUS at 08:30

## 2023-12-29 SDOH — HEALTH STABILITY: MENTAL HEALTH: CURRENT SMOKER: 0

## 2023-12-29 ASSESSMENT — PAIN - FUNCTIONAL ASSESSMENT
PAIN_FUNCTIONAL_ASSESSMENT: 0-10

## 2023-12-29 ASSESSMENT — PAIN SCALES - GENERAL
PAINLEVEL_OUTOF10: 0 - NO PAIN
PAINLEVEL_OUTOF10: 0 - NO PAIN
PAIN_LEVEL: 0
PAINLEVEL_OUTOF10: 0 - NO PAIN
PAINLEVEL_OUTOF10: 0 - NO PAIN

## 2023-12-29 ASSESSMENT — COLUMBIA-SUICIDE SEVERITY RATING SCALE - C-SSRS
1. IN THE PAST MONTH, HAVE YOU WISHED YOU WERE DEAD OR WISHED YOU COULD GO TO SLEEP AND NOT WAKE UP?: NO
6. HAVE YOU EVER DONE ANYTHING, STARTED TO DO ANYTHING, OR PREPARED TO DO ANYTHING TO END YOUR LIFE?: NO
2. HAVE YOU ACTUALLY HAD ANY THOUGHTS OF KILLING YOURSELF?: NO

## 2023-12-29 NOTE — PERIOPERATIVE NURSING NOTE
Pt received from OR via art, awake, reoriented to time and place. Pt denies pain, abdomen soft. No repeat BS per Attending anesthesia. VS baseline.

## 2023-12-29 NOTE — ANESTHESIA PREPROCEDURE EVALUATION
Patient: Jerald Sandoval    Procedure Information       Date/Time: 12/29/23 0745    Procedures:       Biopsy Prostate with Navigation      Ultrasonography Transrectal Prostate (URONAV, ULTRASOUND AND TECH, MRI DONE AT  12/1/23,)    Location: [facility] / [facility]    Surgeons: Jeremi Gonzalez MD            Relevant Problems   Anesthesia (within normal limits)      Cardiovascular   (+) Aortic stenosis (S/p TAVR 2/23)   (+) Essential hypertension   (+) Mixed hyperlipidemia   (+) Valvular heart disease      Endocrine   (+) Diabetes mellitus, type 2 (CMS/HCC)   (+) Diabetic neuropathy (CMS/HCC)   (+) Hypothyroidism   (+) Morbid obesity (CMS/HCC)      GI   (+) Gastroesophageal reflux disease without esophagitis   (+) PUD (peptic ulcer disease)      /Renal  Elevated PSA      Neuro/Psych   (+) Peripheral polyneuropathy      Pulmonary   (+) Obstructive sleep apnea syndrome      GI/Hepatic (within normal limits)      Hematology (within normal limits)      Musculoskeletal   (+) Primary osteoarthritis involving multiple joints      Eyes, Ears, Nose, and Throat (within normal limits)      Infectious Disease (within normal limits)      Other   (+) Arthritis (gout)       Clinical information reviewed:   Tobacco  Allergies  Meds   Med Hx  Surg Hx   Fam Hx  Soc Hx        NPO Detail:  NPO/Void Status  Date of Last Liquid: 12/28/23  Time of Last Liquid: 1900  Date of Last Solid: 12/28/23  Time of Last Solid: 1800  Last Intake Type: Clear fluids; Light meal  Time of Last Void: 0500         Physical Exam    Airway  Mallampati: II  TM distance: >3 FB  Neck ROM: full     Cardiovascular - normal exam     Dental    Pulmonary - normal exam     Abdominal - normal exam         Anesthesia Plan    ASA 3     general     The patient is not a current smoker.  Patient was not previously instructed to abstain from smoking on day of procedure.  Patient did not smoke on day of procedure.  Education provided regarding risk of obstructive  sleep apnea.  intravenous induction   Trial extubation is planned.  Anesthetic plan and risks discussed with patient.  Use of blood products discussed with patient who consented to blood products.

## 2023-12-29 NOTE — OP NOTE
Biopsy Prostate with Navigation, Ultrasonography Transrectal Prostate (URONAV, ULTRASOUND AND TECH, MRI DONE AT  23,) Operative Note     Date: 2023  OR Location: [facility]    Name: Jerald Sandoval, : 1950, Age: 73 y.o., MRN: [id number], Sex: male    Diagnosis  Pre-op Diagnosis     * Elevated PSA [R97.20] Post-op Diagnosis     * Elevated PSA [R97.20]     Procedures  Biopsy Prostate with Navigation  59167 - ME PROSTATE NEEDLE BIOPSY ANY APPROACH    Ultrasonography Transrectal Prostate (URONAV, ULTRASOUND AND TECH, MRI DONE AT  23,)  15079 - [facility] US TRANSRECTAL      Surgeons      * Jeremi Gonzalez - Primary    Resident/Fellow/Other Assistant:  Surgeon(s) and Role:    Procedure Summary  Anesthesia: Monitor Anesthesia Care  ASA: III  Anesthesia Staff: Anesthesiologist: Mirian Ozuna MD MPH  Estimated Blood Loss: 0 mL  Intra-op Medications:   Medication Name Total Dose   ciprofloxacin (Cipro) IVPB 400 mg Cannot be calculated              Anesthesia Record               Intraprocedure I/O Totals          Intake    ciprofloxacin (Cipro) IVPB 400 mg 200.00 mL    Total Intake 200 mL          Specimen:   ID Type Source Tests Collected by Time   A :   SOFT TISSUE BIOPSY  Jeremi Gonzalez MD 2023   B :   SOFT TISSUE BIOPSY  Jeremi Gonzalez MD 2023   C :   SOFT TISSUE BIOPSY  Jeremi Gonzalez MD 2023   D :   SOFT TISSUE BIOPSY  Jeremi Gonzalez MD 2023   E :   SOFT TISSUE BIOPSY  Jeremi Gonzalez MD 2023   F :   SOFT TISSUE BIOPSY  Jeremi Gonzalez MD 2023   G :   SOFT TISSUE BIOPSY  Jeremi Gonzalez MD 2023   H :   SOFT TISSUE BIOPSY  Jeremi Gonzalez MD 2023   I :   SOFT TISSUE BIOPSY  Jeremi Gonzalez MD 2023   J :   SOFT TISSUE BIOPSY  Jeremi Gonzalez MD 2023   K :   SOFT TISSUE BIOPSY  Jeremi Gonzalez MD 2023   L :   SOFT TISSUE BIOPSY  Jeremi Gonzalez MD 2023  0833   M :   SOFT TISSUE BIOPSY  Jeremi Gonzalez MD 12/29/2023 0833   N :   SOFT TISSUE BIOPSY  Jeremi Gonzalez MD 12/29/2023 0833        Staff:   Circulator: Marija Sahu RN  Scrub Person: Ilodilon Tsering           Drains and/or Catheters: None    Findings: Biopsies obtained from 2 areas of interest and systematic biopsies    Indications: Jerald Sandoval is an 73 y.o. male who is having surgery for ELEVATED PSA R97.20.  He has history of an elevated PSA and positive MRI.  He presents today for MRI fusion guided biopsy.  Risks including infection and bleeding were reviewed.  Written consent was obtained.    Procedure Details: Patient was taken to the operating room and given general anesthesia.  He was placed in the left lateral decubitus position.  Digital rectal exam was negative for nodule.  A transrectal ultrasound probe was inserted.  Biopsies were obtained from the right and left apex mid gland and base, medially and laterally.  Area interest 1 was of the anterior right prostate and area of interest 2 was small and near the left apex.  Patient tolerated the procedure and was transferred to the recovery room in stable condition.    Complications:  None; patient tolerated the procedure well.        Jeremi Gonzalez  Phone Number: [phone]

## 2023-12-29 NOTE — H&P
"Reason For Consult  Elevated PSA    History Of Present Illness  Jerald Sandoval is a 73 y.o. male presenting with elevated PSA, MRI fusion guided prostate biopsy.     Past Medical History  He has a past medical history of Diabetes mellitus (CMS/HCC), Gout, History of stomach ulcers, History of transcatheter aortic valve replacement (TAVR), Hyperlipemia, Hypertension, Hypothyroidism, Obstructive sleep apnea treated with continuous positive airway pressure (CPAP), and Sleep apnea.    Surgical History  He has a past surgical history that includes Rotator cuff repair (Bilateral); Carpal tunnel release (Bilateral); Thumb amputation (Left); and Aortic valve replacement (N/A).     Social History  He reports that he has never smoked. He has never used smokeless tobacco. He reports that he does not use drugs. No history on file for alcohol use.    Family History  Family History   Problem Relation Name Age of Onset    Heart disease Mother      Lung cancer Father      Heart disease Father      Other (Other) Father          CAB x5    Lung cancer Brother      Heart attack Son      Heart attack Son          Allergies  Cephalosporins and Penicillin    Review of Systems  Elevated PSA     Physical Exam  Alert, oriented  Normal resp effort  RRR  Soft, NT, ND     Last Recorded Vitals  Blood pressure 147/86, pulse 75, temperature 36.6 °C (97.9 °F), temperature source Temporal, resp. rate 16, height 1.778 m (5' 10\"), weight 136 kg (300 lb 7.8 oz), SpO2 95 %.    Relevant Results  MRI,. PSA     Assessment/Plan     Elevated PSA, MRI fusion guided prostate biopsy today.        Jeremi Gonzalez MD    "

## 2023-12-29 NOTE — ANESTHESIA POSTPROCEDURE EVALUATION
Patient: Jerald Sandoval    Procedure Summary       Date: 12/29/23 Room / Location: [facility] / [facility]    Anesthesia Start: 0818 Anesthesia Stop:     Procedures:       Biopsy Prostate with Navigation      Ultrasonography Transrectal Prostate (URONAV, ULTRASOUND AND TECH, MRI DONE AT  12/1/23,) Diagnosis:       Elevated PSA      (ELEVATED PSA R97.20)    Surgeons: Jeremi Gonzalez MD Responsible Provider: Mirian Ozuna MD MPH    Anesthesia Type: general ASA Status: 3            Anesthesia Type: general    Vitals Value Taken Time   /70 12/29/23 0849   Temp 36.9 °C (98.4 °F) 12/29/23 0849   Pulse 74 12/29/23 0849   Resp 14 12/29/23 0849   SpO2 94 % 12/29/23 0849       Anesthesia Post Evaluation    Patient location during evaluation: PACU  Patient participation: complete - patient participated  Level of consciousness: awake and alert  Pain score: 0  Pain management: adequate  Multimodal analgesia pain management approach  Airway patency: patent  Two or more strategies used to mitigate risk of obstructive sleep apnea  Cardiovascular status: acceptable  Respiratory status: acceptable  Hydration status: acceptable  Postoperative Nausea and Vomiting: none    No notable events documented.

## 2024-01-02 ASSESSMENT — PAIN SCALES - GENERAL: PAINLEVEL_OUTOF10: 0 - NO PAIN

## 2024-01-08 LAB
LAB AP ASR DISCLAIMER: NORMAL
LABORATORY COMMENT REPORT: NORMAL
PATH REPORT.FINAL DX SPEC: NORMAL
PATH REPORT.GROSS SPEC: NORMAL
PATH REPORT.RELEVANT HX SPEC: NORMAL
PATH REPORT.TOTAL CANCER: NORMAL

## 2024-01-12 ENCOUNTER — APPOINTMENT (OUTPATIENT)
Dept: RADIOLOGY | Facility: HOSPITAL | Age: 74
End: 2024-01-12
Payer: MEDICARE

## 2024-01-12 ENCOUNTER — HOSPITAL ENCOUNTER (EMERGENCY)
Facility: HOSPITAL | Age: 74
Discharge: HOME | End: 2024-01-12
Attending: EMERGENCY MEDICINE
Payer: MEDICARE

## 2024-01-12 ENCOUNTER — APPOINTMENT (OUTPATIENT)
Dept: CARDIOLOGY | Facility: HOSPITAL | Age: 74
End: 2024-01-12
Payer: MEDICARE

## 2024-01-12 VITALS
TEMPERATURE: 98.6 F | OXYGEN SATURATION: 100 % | SYSTOLIC BLOOD PRESSURE: 134 MMHG | HEIGHT: 70 IN | HEART RATE: 98 BPM | BODY MASS INDEX: 41.8 KG/M2 | DIASTOLIC BLOOD PRESSURE: 72 MMHG | WEIGHT: 292 LBS | RESPIRATION RATE: 16 BRPM

## 2024-01-12 DIAGNOSIS — R42 VERTIGO: Primary | ICD-10-CM

## 2024-01-12 LAB
ALBUMIN SERPL BCP-MCNC: 4.2 G/DL (ref 3.4–5)
ALP SERPL-CCNC: 77 U/L (ref 33–136)
ALT SERPL W P-5'-P-CCNC: 15 U/L (ref 10–52)
ANION GAP SERPL CALC-SCNC: 13 MMOL/L (ref 10–20)
AST SERPL W P-5'-P-CCNC: 16 U/L (ref 9–39)
BASOPHILS # BLD AUTO: 0.04 X10*3/UL (ref 0–0.1)
BASOPHILS NFR BLD AUTO: 0.4 %
BILIRUB SERPL-MCNC: 1.1 MG/DL (ref 0–1.2)
BNP SERPL-MCNC: 25 PG/ML (ref 0–99)
BUN SERPL-MCNC: 14 MG/DL (ref 6–23)
CALCIUM SERPL-MCNC: 9.6 MG/DL (ref 8.6–10.3)
CARDIAC TROPONIN I PNL SERPL HS: 13 NG/L (ref 0–20)
CHLORIDE SERPL-SCNC: 99 MMOL/L (ref 98–107)
CO2 SERPL-SCNC: 28 MMOL/L (ref 21–32)
CREAT SERPL-MCNC: 1.37 MG/DL (ref 0.5–1.3)
EGFRCR SERPLBLD CKD-EPI 2021: 54 ML/MIN/1.73M*2
EOSINOPHIL # BLD AUTO: 0.02 X10*3/UL (ref 0–0.4)
EOSINOPHIL NFR BLD AUTO: 0.2 %
ERYTHROCYTE [DISTWIDTH] IN BLOOD BY AUTOMATED COUNT: 15.2 % (ref 11.5–14.5)
GLUCOSE SERPL-MCNC: 157 MG/DL (ref 74–99)
HCT VFR BLD AUTO: 43.3 % (ref 41–52)
HGB BLD-MCNC: 13.9 G/DL (ref 13.5–17.5)
IMM GRANULOCYTES # BLD AUTO: 0.03 X10*3/UL (ref 0–0.5)
IMM GRANULOCYTES NFR BLD AUTO: 0.3 % (ref 0–0.9)
LYMPHOCYTES # BLD AUTO: 0.89 X10*3/UL (ref 0.8–3)
LYMPHOCYTES NFR BLD AUTO: 8.2 %
MCH RBC QN AUTO: 27.1 PG (ref 26–34)
MCHC RBC AUTO-ENTMCNC: 32.1 G/DL (ref 32–36)
MCV RBC AUTO: 84 FL (ref 80–100)
MONOCYTES # BLD AUTO: 1.24 X10*3/UL (ref 0.05–0.8)
MONOCYTES NFR BLD AUTO: 11.5 %
NEUTROPHILS # BLD AUTO: 8.57 X10*3/UL (ref 1.6–5.5)
NEUTROPHILS NFR BLD AUTO: 79.4 %
NRBC BLD-RTO: 0 /100 WBCS (ref 0–0)
PLATELET # BLD AUTO: 173 X10*3/UL (ref 150–450)
POTASSIUM SERPL-SCNC: 4 MMOL/L (ref 3.5–5.3)
PROT SERPL-MCNC: 7.2 G/DL (ref 6.4–8.2)
RBC # BLD AUTO: 5.13 X10*6/UL (ref 4.5–5.9)
SODIUM SERPL-SCNC: 136 MMOL/L (ref 136–145)
WBC # BLD AUTO: 10.8 X10*3/UL (ref 4.4–11.3)

## 2024-01-12 PROCEDURE — 70450 CT HEAD/BRAIN W/O DYE: CPT | Performed by: RADIOLOGY

## 2024-01-12 PROCEDURE — 70547 MR ANGIOGRAPHY NECK W/O DYE: CPT | Performed by: RADIOLOGY

## 2024-01-12 PROCEDURE — 2500000001 HC RX 250 WO HCPCS SELF ADMINISTERED DRUGS (ALT 637 FOR MEDICARE OP): Performed by: PHYSICIAN ASSISTANT

## 2024-01-12 PROCEDURE — 70450 CT HEAD/BRAIN W/O DYE: CPT

## 2024-01-12 PROCEDURE — 70544 MR ANGIOGRAPHY HEAD W/O DYE: CPT | Performed by: RADIOLOGY

## 2024-01-12 PROCEDURE — 93005 ELECTROCARDIOGRAM TRACING: CPT

## 2024-01-12 PROCEDURE — 99285 EMERGENCY DEPT VISIT HI MDM: CPT | Performed by: EMERGENCY MEDICINE

## 2024-01-12 PROCEDURE — 70544 MR ANGIOGRAPHY HEAD W/O DYE: CPT | Mod: 59

## 2024-01-12 PROCEDURE — 70551 MRI BRAIN STEM W/O DYE: CPT

## 2024-01-12 PROCEDURE — 36415 COLL VENOUS BLD VENIPUNCTURE: CPT | Performed by: PHYSICIAN ASSISTANT

## 2024-01-12 PROCEDURE — 84484 ASSAY OF TROPONIN QUANT: CPT | Performed by: PHYSICIAN ASSISTANT

## 2024-01-12 PROCEDURE — 70551 MRI BRAIN STEM W/O DYE: CPT | Performed by: RADIOLOGY

## 2024-01-12 PROCEDURE — 83880 ASSAY OF NATRIURETIC PEPTIDE: CPT | Performed by: PHYSICIAN ASSISTANT

## 2024-01-12 PROCEDURE — 70547 MR ANGIOGRAPHY NECK W/O DYE: CPT

## 2024-01-12 PROCEDURE — 84075 ASSAY ALKALINE PHOSPHATASE: CPT | Performed by: PHYSICIAN ASSISTANT

## 2024-01-12 PROCEDURE — 85025 COMPLETE CBC W/AUTO DIFF WBC: CPT | Performed by: PHYSICIAN ASSISTANT

## 2024-01-12 RX ORDER — MECLIZINE HYDROCHLORIDE 25 MG/1
25 TABLET ORAL 3 TIMES DAILY PRN
Qty: 30 TABLET | Refills: 0 | Status: SHIPPED | OUTPATIENT
Start: 2024-01-12 | End: 2024-01-22

## 2024-01-12 RX ORDER — MECLIZINE HYDROCHLORIDE 25 MG/1
25 TABLET ORAL ONCE
Status: COMPLETED | OUTPATIENT
Start: 2024-01-12 | End: 2024-01-12

## 2024-01-12 RX ADMIN — MECLIZINE HYDROCHLORIDE 25 MG: 25 TABLET ORAL at 10:19

## 2024-01-12 ASSESSMENT — LIFESTYLE VARIABLES
HAVE PEOPLE ANNOYED YOU BY CRITICIZING YOUR DRINKING: NO
EVER HAD A DRINK FIRST THING IN THE MORNING TO STEADY YOUR NERVES TO GET RID OF A HANGOVER: NO
HAVE YOU EVER FELT YOU SHOULD CUT DOWN ON YOUR DRINKING: NO
REASON UNABLE TO ASSESS: NO
EVER FELT BAD OR GUILTY ABOUT YOUR DRINKING: NO

## 2024-01-12 ASSESSMENT — PAIN DESCRIPTION - LOCATION: LOCATION: HEAD

## 2024-01-12 ASSESSMENT — PAIN DESCRIPTION - PAIN TYPE: TYPE: ACUTE PAIN

## 2024-01-12 ASSESSMENT — PAIN SCALES - GENERAL: PAINLEVEL_OUTOF10: 5 - MODERATE PAIN

## 2024-01-12 ASSESSMENT — PAIN - FUNCTIONAL ASSESSMENT: PAIN_FUNCTIONAL_ASSESSMENT: 0-10

## 2024-01-12 NOTE — ED PROVIDER NOTES
HPI   Chief Complaint   Patient presents with    Dizziness     Dizziness for few days after working out wednesday       History of present illness:  73-year-old male presents to the emergency room for complaints of dizziness.  The patient states that yesterday around 10 AM he began having dizziness.  He states that he also developed a slight headache across the front of his head and he states is not the worst headache of his life.  He states he has had other headaches that were significantly worse but he states this one has been more consistent.  He states that whenever he stands up he feels like he is going to fall backwards and has to grab onto something.  He has never had this before.  He has a past medical history of coronary artery disease and had a cardiac bypass performed last year was also recently diagnosed with prostate cancer.  In addition that he states that he has a history of prediabetes hypertension and hyperlipidemia.  He denies any other symptoms at this time including any chest pain shortness of breath or palpitations.    Social history: Negative for alcohol and drug use.    Review of systems:   Gen.: No weight loss, fatigue, anorexia, insomnia, fever.   Eyes: No vision loss, double vision  ENT: No pharyngitis, neck pain  Cardiac: No chest pain, palpitations, syncope, near syncope.   Pulmonary: No shortness of breath, cough, hemoptysis.   Heme/lymph: No swollen glands, fever, bleeding.   GI: No abdominal pain, change in bowel habits, melena, hematemesis, hematochezia, nausea, vomiting, diarrhea.   : No discharge, dysuria, frequency, urgency, hematuria.   Musculoskeletal: No limb pain, joint pain, joint swelling.   Skin: No rashes.   Review of systems is otherwise negative unless stated above or in history of present illness.      Physical exam:  General: Vitals noted, no distress. Afebrile.   EENT: TMs clear.  No lymphadenopathy appreciated  Cardiac: Regular, rate, rhythm, no murmur.   Pulmonary:  "Lungs clear bilaterally with good aeration. No adventitious breath sounds.   Abdomen: Soft, nonsurgical. Nontender. No peritoneal signs. Normoactive bowel sounds.   Extremities: +2 pitting edema bilaterally in the legs across the anterior tibia  Skin: No rash.   Neuro: Full strength and sensation to bilateral upper and lower extremities.  Patient stands and ambulates without assistance.  Visual fields are intact.  No dysmetria on exam.      Medical decision making:   On presentation the emergency department, the patient was in stable condition with vital signs within normal limits, and afebrile.  Clinically is well-appearing, awake and alert, nontoxic.  On initial evaluation, the patient endorses some feeling of \"off balance\" when standing.  He is able to stand and ambulate short distance, but complains of symptoms.  Differential diagnosis includes central versus peripheral process.  Initial exam findings are more consistent with peripheral process however workup was initiated with CT scan of the head as well as laboratory work.    Testing: CT scan of the head without contrast, CBC CMP troponin BNP    -  CT scan head without contrast does not show any acute findings CBC CMP troponin BNP unremarkable at this time all imaging was interpreted by radiology    Treatment: Antivert 25 mg p.o. given    Reevaluation: Patient reports minimal improvement in symptoms.  Still feels symptomatic when walking.     Plan: 73-year-old male presents to the emergency room for complaints of dizziness.  The patient states that yesterday around 10 AM he began having dizziness.  He states that he also developed a slight headache across the front of his head and he states is not the worst headache of his life.  He states he has had other headaches that were significantly worse but he states this 1 has been more consistent.  He states that whenever he stands up he feels like he is going to fall backwards and has to grab onto something.  He " "has never had this before.  He has a past medical history of coronary artery disease and had a cardiac bypass performed last year was also recently diagnosed with prostate cancer.  In addition that he states that he has a history of prediabetes hypertension and hyperlipidemia.  He denies any other symptoms at this time including any chest pain shortness of breath or palpitations. EENT: TMs clear.  No lymphadenopathy appreciated.  MRI of brain without contrast MRA of head and neck without contrast: No acute findings no stroke seen.  Upon reevaluation the patient states his vertigo seems to be improving at this time after having MRIs done.  I explained to him that we do not see any acute findings as interpreted by radiology and the MRIs at this time.  I explained to him they can follow-up with ENT as well as neurology and he was agreeable to this plan.      Impression:   1.  Vertigo            History provided by:  Patient   used: No                        [place] Coma Scale Score: 15         NIH Stroke Scale: 0          Patient History   Past Medical History:   Diagnosis Date    Diabetes mellitus (CMS/HCC)     \"pre-diabetic\" per pt    Gout     History of stomach ulcers     d/t daily aleve per pt    History of transcatheter aortic valve replacement (TAVR)     Hyperlipemia     Hypertension     Hypothyroidism     Obstructive sleep apnea treated with continuous positive airway pressure (CPAP)     Sleep apnea      Past Surgical History:   Procedure Laterality Date    AORTIC VALVE REPLACEMENT N/A     CARPAL TUNNEL RELEASE Bilateral     ROTATOR CUFF REPAIR Bilateral     THUMB AMPUTATION Left     d/t trauma     Family History   Problem Relation Name Age of Onset    Heart disease Mother      Lung cancer Father      Heart disease Father      Other (Other) Father          CAB x5    Lung cancer Brother      Heart attack Son      Heart attack Son       Social History     Tobacco Use    Smoking status: Never    " Smokeless tobacco: Never   Vaping Use    Vaping Use: Never used   Substance Use Topics    Alcohol use: Not on file    Drug use: Never       Physical Exam   ED Triage Vitals   Temp Heart Rate Resp BP   01/12/24 0919 01/12/24 0919 01/12/24 0919 01/12/24 0915   37 °C (98.6 °F) 110 16 136/87      SpO2 Temp Source Heart Rate Source Patient Position   01/12/24 0915 01/12/24 0919 -- --   96 % Tympanic        BP Location FiO2 (%)     -- --             Physical Exam    ED Course & MDM   ED Course as of 01/15/24 2201   Fri Jan 12, 2024   1912 EKG taken at 959 in January 12, 2024 shows sinus tachycardia at 102, left bundle branch block present and seen on previous EKGs from 2 years ago in 2022.  No other acute findings, no STEMI present [JF]      ED Course User Index  [JF] Lucio Osuna PA-C         Diagnoses as of 01/15/24 2201   Vertigo       Medical Decision Making      Procedure  Procedures     Lucio Osuna PA-C  01/15/24 2203    -------------------------------------------  This patient was seen by the MARY in a shared visit. I personally saw the patient and made/approved the management plan and take responsibility for the patient management. I reviewed and edited the above documentation where necessary.     I have looked at the EKG and agree with the interpretation.    Benson Koenig MD  EM Attending Physician        Benson Koenig MD MPH  01/16/24 1917

## 2024-01-18 LAB
ATRIAL RATE: 102 BPM
P AXIS: 24 DEGREES
P OFFSET: 183 MS
P ONSET: 125 MS
PR INTERVAL: 174 MS
Q ONSET: 212 MS
QRS COUNT: 17 BEATS
QRS DURATION: 154 MS
QT INTERVAL: 386 MS
QTC CALCULATION(BAZETT): 503 MS
QTC FREDERICIA: 460 MS
R AXIS: -10 DEGREES
T AXIS: 155 DEGREES
T OFFSET: 405 MS
VENTRICULAR RATE: 102 BPM

## 2024-01-30 ENCOUNTER — HOSPITAL ENCOUNTER (OUTPATIENT)
Dept: RADIOLOGY | Facility: CLINIC | Age: 74
Discharge: HOME | End: 2024-01-30
Payer: MEDICARE

## 2024-01-30 DIAGNOSIS — C61 MALIGNANT NEOPLASM OF PROSTATE (MULTI): ICD-10-CM

## 2024-01-30 PROCEDURE — A9800 HC RX 343 DIAGNOSTIC RADIOPHARMACEUTICALS: HCPCS | Performed by: UROLOGY

## 2024-01-30 PROCEDURE — 78815 PET IMAGE W/CT SKULL-THIGH: CPT

## 2024-01-30 PROCEDURE — 3430000001 HC RX 343 DIAGNOSTIC RADIOPHARMACEUTICALS: Performed by: UROLOGY

## 2024-01-30 RX ADMIN — KIT FOR THE PREPARATION OF GALLIUM GA 68 GOZETOTIDE 5.5 MILLICURIE: 25 INJECTION, POWDER, LYOPHILIZED, FOR SOLUTION INTRAVENOUS at 08:21

## 2024-02-09 PROBLEM — R42 DIZZINESS AND GIDDINESS: Status: ACTIVE | Noted: 2024-02-09

## 2024-02-09 PROBLEM — C61 MALIGNANT NEOPLASM OF PROSTATE (MULTI): Status: ACTIVE | Noted: 2024-02-09

## 2024-02-09 PROBLEM — S83.207A UNSPECIFIED TEAR OF UNSPECIFIED MENISCUS, CURRENT INJURY, LEFT KNEE, INITIAL ENCOUNTER: Status: ACTIVE | Noted: 2024-02-09

## 2024-02-09 PROBLEM — R53.83 FATIGUE: Status: ACTIVE | Noted: 2024-02-09

## 2024-02-09 PROBLEM — I25.10 ATHEROSCLEROTIC HEART DISEASE OF NATIVE CORONARY ARTERY WITHOUT ANGINA PECTORIS: Status: ACTIVE | Noted: 2023-01-10

## 2024-02-09 PROBLEM — R97.20 HIGH PROSTATE SPECIFIC ANTIGEN (PSA): Status: ACTIVE | Noted: 2023-10-16

## 2024-02-09 PROBLEM — E55.9 VITAMIN D DEFICIENCY: Status: ACTIVE | Noted: 2024-02-09

## 2024-02-09 PROBLEM — Z85.828 HISTORY OF MALIGNANT NEOPLASM OF SKIN: Status: ACTIVE | Noted: 2024-02-09

## 2024-02-09 PROBLEM — Z88.8 ALLERGY STATUS TO OTHER DRUGS, MEDICAMENTS AND BIOLOGICAL SUBSTANCES: Status: ACTIVE | Noted: 2023-01-10

## 2024-02-09 PROBLEM — M17.12 ARTHRITIS OF LEFT KNEE: Status: ACTIVE | Noted: 2024-02-09

## 2024-02-09 RX ORDER — HYDROCHLOROTHIAZIDE 25 MG/1
TABLET ORAL
COMMUNITY
Start: 2023-12-12 | End: 2024-02-12 | Stop reason: SDUPTHER

## 2024-02-09 NOTE — PROGRESS NOTES
Oncology New Patient Visit  Patient ID: Jerald Sandoval is a 73 y.o. male who presents today to [facility] care.  Referring Physician: Jeremi Gonzalez MD  [address]  [address]  [place] [address]  Primary Care Provider: MD Jeremi Espinoza Dr     Cancer History  Prostate Cancer - stage TBD/   Treatment  -elevated PSA, I PSA 6.63, MRI 11/23 - lesion in right transition zone, right peripheral zone, non specific focus in left peripheral zone, Biopsy 12/29/23 -lesions 3+4 equal 7 grade group 2 involving 4 cores, [place]uff's 4+3 grade group 3 involving 3 cores, PET PSMA -focally increased expression within the prostate gland right greater than left mild hypermetabolic activity within small bilateral inguinal lymph nodes SUV 3.1  / no other mets    Other contributing history  TAVR, HLD, HTN,  DM, Gout, Hypothyroid, ED visit 1/24 - MRI possible NPH , Basal Cell carcinoma,     HPI  Referred by provider/s above.  Oncology history is summarized above.  The patient's wife is also with him.  He has overall been doing well after the biopsy did have some weakness and urinary tract infection and did have nonspecific findings with the brain MRI.  Does have some recent issues with some increasing lower extremity edema.  Denies any other worsening issues with hematuria or worsening urinary symptoms.  Does have some decrease in stream denies any significant weight loss.  Lower extremity edema has been slightly increased recently.    ROS:  10-pt ROS reviewed and negative except as mentioned above.    Medications: below was reviewed and is accurate  Current Outpatient Medications   Medication Instructions    acetaminophen (Tylenol) 325 mg tablet 2 tablets, oral, Every 6 hours PRN    allopurinol (Zyloprim) 300 mg tablet 1 tablet, oral, Daily    aspirin 81 mg chewable tablet 1 tablet, oral, Daily    azithromycin (Zithromax) 500 mg tablet TAKE 1 TABLET BY MOUTH  "30-60 MINUTES BEFORE YOUR DENTAL PROCEDURE.    carboxymethylcellulose (Refresh Celluvisc) 1 % ophthalmic solution dropperette 1 drop, ophthalmic (eye), 2 times daily, (AM and PM)    diphenhydrAMINE (BENADryl) 50 mg capsule 1 capsule, oral, Nightly    glucosam/chond/hyalu/CF borate (MOVE FREE JOINT HEALTH ORAL) oral, As directed    hydroCHLOROthiazide (HYDRODiuril) 12.5 mg tablet 1 tablet, oral, Daily    levothyroxine (Synthroid, Levoxyl) 150 mcg tablet 1 tablet, oral, Daily before breakfast    metFORMIN XR (Glucophage-XR) 500 mg 24 hr tablet 1 tablet, oral, Daily, With meal    NIFEdipine CC 60 mg 24 hr tablet 1 tablet, oral, Daily    omeprazole (PriLOSEC) 40 mg DR capsule 1 capsule, oral, Every 48 hours    simvastatin (Zocor) 10 mg tablet 1 tablet, oral, Daily        Past Medical History  Past Medical History:   Diagnosis Date    Diabetes mellitus (CMS/HCC)     \"pre-diabetic\" per pt    Gout     History of stomach ulcers     d/t daily aleve per pt    History of transcatheter aortic valve replacement (TAVR)     Hyperlipemia     Hypertension     Hypothyroidism     Obstructive sleep apnea treated with continuous positive airway pressure (CPAP)     Sleep apnea      FamilyHistory  Family History   Problem Relation Name Age of Onset    Heart disease Mother      Lung cancer Father      Heart disease Father      Other (Other) Father          CAB x5    Lung cancer Brother      Heart attack Son      Heart attack Son     Dad - pancreatic cancer  Paternal Uncle - bone scan  Brother - lung cancer    Past Surgical History  Past Surgical History:   Procedure Laterality Date    AORTIC VALVE REPLACEMENT N/A     CARPAL TUNNEL RELEASE Bilateral     ROTATOR CUFF REPAIR Bilateral     THUMB AMPUTATION Left     d/t trauma     Social History  Lives in [place]  Retired / heavy equipment  He  reports current alcohol use of about 4.0 standard drinks of alcohol per week.  He  reports no history of drug use.    Physical exam:  Vitals:    " "02/12/24 1358   BP: 126/81   BP Location: Left arm   Patient Position: Sitting   BP Cuff Size: Large adult long   Pulse: 88   Resp: 18   Temp: 36.1 °C (97 °F)   TempSrc: Temporal   SpO2: 96%   Weight: 135 kg (297 lb 15.2 oz)   Height: 1.765 m (5' 9.49\")       GEN: NAD, well-appearing  HEENT: no clear lesions seen  NECK: no clear masses  LYMPH: no palpable adenopathy  SKIN: no concerning new lesions, some areas of well-healed surgical scars and keratoses appreciated  LUNGS: CTA 2/6 HSM  CV: RRR no clear R/G  ABD: Soft, NTND. No rebound or guarding.  EXT: Symmetric lower extremity edema  NEURO: Grossly intact. No focal deficits.  PSYCH: Normal mood and affect    Radiology review  Reviewed in detail personally and for detail see results in EPIC        Genomics Data    Laboratory review  Reviewed in detail personally and for detail see results in [facility]  Lab Results   Component Value Date    WBC 10.8 01/12/2024    WBC 5.4 09/14/2023    WBC 4.5 02/22/2023     Lab Results   Component Value Date    HGB 13.9 01/12/2024    HGB 14.2 09/14/2023    HGB 12.5 (L) 02/22/2023     Lab Results   Component Value Date     01/12/2024     09/14/2023     02/22/2023     Lab Results   Component Value Date    GLUCOSE 157 (H) 01/12/2024    CALCIUM 9.6 01/12/2024     01/12/2024    K 4.0 01/12/2024    CO2 28 01/12/2024    CL 99 01/12/2024    BUN 14 01/12/2024    CREATININE 1.37 (H) 01/12/2024     Lab Results   Component Value Date    PSA 6.43 (H) 10/16/2023    PSA 6.1 (H) 09/14/2023    PSA 5.5 (H) 09/06/2022     Lab Results   Component Value Date    ALT 15 01/12/2024    AST 16 01/12/2024    ALKPHOS 77 01/12/2024    BILITOT 1.1 01/12/2024       ASSESSMENT AND PLAN   We had a long discussion regarding the natural history, prognosis, and potential treatment options for his disease.  We discussed timing of therapy and next line standard options as well as clinical trial options for his current disease state. Discussed also " NCCN guidlines as per the patients diagnosis and treatment options.  The Total Visit time for this visit was minutes , which includes the following :  face to face time during the visit today if in person, phone / virtual time with the patient,  review of records, including office notes, pathology, radiology, labs, and prior treatments and care coordination. This review directly influenced my assessment and recommendations for this visit.    Prostate Cancer -discussed in detail concern for at least unfavorable intermediate risk prostate cancer and concerning lymph nodes.  Will need to review the PET scan in our tumor board to discuss as if found to have merissa disease then treatment options were different.  Backbone of treatment for unfavorable intermediate risk would be surgery versus ADT and radiation versus clinical study and 6 months of ADT, if found to have merissa disease then backbone will be ADT and radiation potential clinical study 2 years of ADT and potential role of next-generation oral agents.  He is scheduled to follow-up with radiation oncology we will discuss his case in our tumor board next week and review the PET scan and then contact him with follow-up regarding treatment and best options moving forward.  Handout given for ADT to learn about.  Lower extremity edema-unclear etiology do recommend follow-up with his primary care physician atypical for DVT with bilateral and symmetric.  Continue to monitor  CNS findings-nonspecific recommend follow-up with primary care physician and neurologist      Stan Mahmood MD  Senior Attending Physician/  in Medicine [employer] [employer]  [facility] / [facility]  Patient line [phone]  Fax [phone]

## 2024-02-12 ENCOUNTER — OFFICE VISIT (OUTPATIENT)
Dept: HEMATOLOGY/ONCOLOGY | Facility: CLINIC | Age: 74
End: 2024-02-12
Payer: MEDICARE

## 2024-02-12 VITALS
WEIGHT: 297.95 LBS | TEMPERATURE: 97 F | RESPIRATION RATE: 18 BRPM | HEIGHT: 69 IN | SYSTOLIC BLOOD PRESSURE: 126 MMHG | HEART RATE: 88 BPM | OXYGEN SATURATION: 96 % | DIASTOLIC BLOOD PRESSURE: 81 MMHG | BODY MASS INDEX: 44.13 KG/M2

## 2024-02-12 DIAGNOSIS — C61 PROSTATE CANCER (MULTI): Primary | ICD-10-CM

## 2024-02-12 DIAGNOSIS — C61 MALIGNANT NEOPLASM OF PROSTATE (MULTI): ICD-10-CM

## 2024-02-12 PROBLEM — I44.7 LEFT BUNDLE-BRANCH BLOCK, UNSPECIFIED: Status: ACTIVE | Noted: 2023-02-18

## 2024-02-12 PROBLEM — Z87.39 PERSONAL HISTORY OF OTHER DISEASES OF THE MUSCULOSKELETAL SYSTEM AND CONNECTIVE TISSUE: Status: ACTIVE | Noted: 2024-02-12

## 2024-02-12 PROBLEM — D73.89 OTHER DISEASES OF SPLEEN: Status: ACTIVE | Noted: 2023-02-08

## 2024-02-12 PROBLEM — I97.190: Status: ACTIVE | Noted: 2023-02-18

## 2024-02-12 PROBLEM — I10 PRIMARY HYPERTENSION: Status: ACTIVE | Noted: 2024-02-12

## 2024-02-12 PROBLEM — K86.89 OTHER SPECIFIED DISEASES OF PANCREAS (HHS-HCC): Status: ACTIVE | Noted: 2023-02-08

## 2024-02-12 PROBLEM — E66.01 MORBID (SEVERE) OBESITY DUE TO EXCESS CALORIES (MULTI): Status: ACTIVE | Noted: 2023-02-18

## 2024-02-12 PROBLEM — G47.33 OBSTRUCTIVE SLEEP APNEA (ADULT) (PEDIATRIC): Status: ACTIVE | Noted: 2023-01-10

## 2024-02-12 PROBLEM — Z79.82 LONG TERM (CURRENT) USE OF ASPIRIN: Status: ACTIVE | Noted: 2023-01-10

## 2024-02-12 PROBLEM — M25.562 PAIN IN LEFT KNEE: Status: ACTIVE | Noted: 2023-06-29

## 2024-02-12 PROBLEM — Z79.84 LONG TERM (CURRENT) USE OF ORAL HYPOGLYCEMIC DRUGS: Status: ACTIVE | Noted: 2023-01-10

## 2024-02-12 PROBLEM — N20.0 CALCULUS OF KIDNEY: Status: ACTIVE | Noted: 2023-02-08

## 2024-02-12 PROCEDURE — 99214 OFFICE O/P EST MOD 30 MIN: CPT | Performed by: INTERNAL MEDICINE

## 2024-02-12 PROCEDURE — 3008F BODY MASS INDEX DOCD: CPT | Performed by: INTERNAL MEDICINE

## 2024-02-12 PROCEDURE — 99204 OFFICE O/P NEW MOD 45 MIN: CPT | Performed by: INTERNAL MEDICINE

## 2024-02-12 PROCEDURE — 1160F RVW MEDS BY RX/DR IN RCRD: CPT | Performed by: INTERNAL MEDICINE

## 2024-02-12 PROCEDURE — 3074F SYST BP LT 130 MM HG: CPT | Performed by: INTERNAL MEDICINE

## 2024-02-12 PROCEDURE — 1159F MED LIST DOCD IN RCRD: CPT | Performed by: INTERNAL MEDICINE

## 2024-02-12 PROCEDURE — 1157F ADVNC CARE PLAN IN RCRD: CPT | Performed by: INTERNAL MEDICINE

## 2024-02-12 PROCEDURE — 1126F AMNT PAIN NOTED NONE PRSNT: CPT | Performed by: INTERNAL MEDICINE

## 2024-02-12 PROCEDURE — 3079F DIAST BP 80-89 MM HG: CPT | Performed by: INTERNAL MEDICINE

## 2024-02-12 PROCEDURE — 1036F TOBACCO NON-USER: CPT | Performed by: INTERNAL MEDICINE

## 2024-02-12 RX ORDER — AZITHROMYCIN 500 MG/1
TABLET, FILM COATED ORAL
COMMUNITY
End: 2024-03-21 | Stop reason: SDUPTHER

## 2024-02-12 RX ORDER — GABAPENTIN 300 MG/1
CAPSULE ORAL
COMMUNITY
Start: 2022-09-12 | End: 2024-02-12 | Stop reason: ALTCHOICE

## 2024-02-12 RX ORDER — CIPROFLOXACIN 500 MG/1
500 TABLET ORAL 2 TIMES DAILY
COMMUNITY
Start: 2024-01-23 | End: 2024-02-12 | Stop reason: ALTCHOICE

## 2024-02-12 RX ORDER — CLINDAMYCIN HYDROCHLORIDE 150 MG/1
CAPSULE ORAL
COMMUNITY
Start: 2024-02-01 | End: 2024-02-12 | Stop reason: ALTCHOICE

## 2024-02-12 ASSESSMENT — PATIENT HEALTH QUESTIONNAIRE - PHQ9
SUM OF ALL RESPONSES TO PHQ9 QUESTIONS 1 AND 2: 0
2. FEELING DOWN, DEPRESSED OR HOPELESS: NOT AT ALL
1. LITTLE INTEREST OR PLEASURE IN DOING THINGS: NOT AT ALL

## 2024-02-12 ASSESSMENT — COLUMBIA-SUICIDE SEVERITY RATING SCALE - C-SSRS
2. HAVE YOU ACTUALLY HAD ANY THOUGHTS OF KILLING YOURSELF?: NO
6. HAVE YOU EVER DONE ANYTHING, STARTED TO DO ANYTHING, OR PREPARED TO DO ANYTHING TO END YOUR LIFE?: NO
1. IN THE PAST MONTH, HAVE YOU WISHED YOU WERE DEAD OR WISHED YOU COULD GO TO SLEEP AND NOT WAKE UP?: NO

## 2024-02-12 ASSESSMENT — PAIN SCALES - GENERAL: PAINLEVEL: 0-NO PAIN

## 2024-02-12 ASSESSMENT — ENCOUNTER SYMPTOMS
DEPRESSION: 0
LOSS OF SENSATION IN FEET: 0
OCCASIONAL FEELINGS OF UNSTEADINESS: 0

## 2024-02-12 NOTE — PROGRESS NOTES
Pt seen in office today for a new patient  visit with Dr. Mahmood for management of his prostate cancer.  He is  without complaints today and denies pain.     Medications, pharmacy preference and allergies were reviewed with patient and updated in the medical record.     Per orders, he is to be presented to Tumor Board on 2/20/27. He is to follow up in office on 2/26/24 with Dr. Mahmood. A PI sheet on [employer] has been printed and reviewed with patient and wife and all questions have been answered.    Our contact information was given to patient and they were encouraged to contact us with any questions or concerns.     Patient verbalized understanding and agreement regarding discussed information via verbal feedback. Pt escorted to scheduling.

## 2024-02-13 ENCOUNTER — TELEPHONE (OUTPATIENT)
Dept: HEMATOLOGY/ONCOLOGY | Facility: CLINIC | Age: 74
End: 2024-02-13
Payer: MEDICARE

## 2024-02-14 ENCOUNTER — TELEPHONE (OUTPATIENT)
Dept: HEMATOLOGY/ONCOLOGY | Facility: HOSPITAL | Age: 74
End: 2024-02-14
Payer: MEDICARE

## 2024-02-14 NOTE — TELEPHONE ENCOUNTER
Pt's wife called saying they missed a call from Dr. Mahmood last night. Asking for a call back when he has a chance. She said right now she is by herself, but if he wants to speak to her and Jerald together then Jerald will be home after 2pm. Number to call is [phone]. Message sent to the team.

## 2024-02-14 NOTE — TELEPHONE ENCOUNTER
Updated re findings  Inguinal nodes non specific monitor   More c/w unfav intermediate risk pca

## 2024-02-14 NOTE — TELEPHONE ENCOUNTER
Jerald calls back to speak with Dr. Mahmood. He can be reached on his cell at [phone].  Message sent to arianne.

## 2024-02-19 ENCOUNTER — HOSPITAL ENCOUNTER (OUTPATIENT)
Dept: RADIATION ONCOLOGY | Facility: CLINIC | Age: 74
Setting detail: RADIATION/ONCOLOGY SERIES
Discharge: HOME | End: 2024-02-19
Payer: MEDICARE

## 2024-02-19 ENCOUNTER — APPOINTMENT (OUTPATIENT)
Dept: RADIATION ONCOLOGY | Facility: CLINIC | Age: 74
End: 2024-02-19
Payer: MEDICARE

## 2024-02-19 VITALS
TEMPERATURE: 96.6 F | SYSTOLIC BLOOD PRESSURE: 133 MMHG | OXYGEN SATURATION: 96 % | BODY MASS INDEX: 43.48 KG/M2 | HEART RATE: 67 BPM | RESPIRATION RATE: 18 BRPM | DIASTOLIC BLOOD PRESSURE: 81 MMHG | WEIGHT: 298.61 LBS

## 2024-02-19 DIAGNOSIS — C61 PROSTATE CANCER (MULTI): Primary | ICD-10-CM

## 2024-02-19 DIAGNOSIS — C61 MALIGNANT NEOPLASM OF PROSTATE (MULTI): ICD-10-CM

## 2024-02-19 PROCEDURE — 77263 THER RADIOLOGY TX PLNG CPLX: CPT | Performed by: RADIOLOGY

## 2024-02-19 PROCEDURE — 99205 OFFICE O/P NEW HI 60 MIN: CPT | Performed by: RADIOLOGY

## 2024-02-19 PROCEDURE — 99215 OFFICE O/P EST HI 40 MIN: CPT | Performed by: RADIOLOGY

## 2024-02-19 ASSESSMENT — ENCOUNTER SYMPTOMS
OCCASIONAL FEELINGS OF UNSTEADINESS: 0
LOSS OF SENSATION IN FEET: 0
DEPRESSION: 0

## 2024-02-19 ASSESSMENT — PATIENT HEALTH QUESTIONNAIRE - PHQ9
SUM OF ALL RESPONSES TO PHQ9 QUESTIONS 1 AND 2: 0
1. LITTLE INTEREST OR PLEASURE IN DOING THINGS: NOT AT ALL
2. FEELING DOWN, DEPRESSED OR HOPELESS: NOT AT ALL

## 2024-02-19 ASSESSMENT — PAIN SCALES - GENERAL: PAINLEVEL: 0-NO PAIN

## 2024-02-19 ASSESSMENT — COLUMBIA-SUICIDE SEVERITY RATING SCALE - C-SSRS: 1. IN THE PAST MONTH, HAVE YOU WISHED YOU WERE DEAD OR WISHED YOU COULD GO TO SLEEP AND NOT WAKE UP?: NO

## 2024-02-19 NOTE — ADDENDUM NOTE
Encounter addended by: Armida Verde RN on: 2/19/2024 3:29 PM   Actions taken: Patient Education assessment filed, Clinical Note Signed

## 2024-02-19 NOTE — PROGRESS NOTES
Patient is in for consult visit. He denies pain, retention, and hematuria. Patient reports urinary urgency, some frequency, occasional incontinence, and weak stream. He does not have daily bowel movements and sometimes deals with constipation. Prior to appointment ending the patient was given written and verbal education on radiation therapy, side effects, CT simulation, and how to contact this office. He was well education on the importance of full bladder and empty rectum and states he will begin working on having daily bowel movements. Pt verbalized understanding of information. CT sim appointment was given. No further concerns for nursing at this time.

## 2024-02-19 NOTE — PROGRESS NOTES
"Radiation Oncology Outpatient Consult    Patient Name:  Jerald Sandoval  MRN:  [id number]  :  1950    Referring Provider: Jeremi Gonzalez MD  Primary Care Provider: Jimmie Rogers MD  Care Team: Patient Care Team:  Jimmie Rogers MD as PCP - General (Internal Medicine)  Jimmie Rogers MD as PCP - [employer] PCP  Stan Mahmood MD as Consulting Physician (Hematology and Oncology)    Date of Service: 2024     SUBJECTIVE  History of Present Illness:  Jerald Sandoval is a 73 y.o. male who was referred by Jeremi Gonzalez MD, for a consultation to the [facility] Department of Radiation Oncology.  He is presenting for evaluation and management of prostate ca.    He was followed w PSA screening. His PSAs sintia to 6.7 in the past year.     Dec 1 2023 MRI prostate revealed lesion in right transition zone, right peripheral zone, non specific focus in left peripheral zone,  PIRADS 5. Prostate measures 29 ml.  Dec 29 2023 biopsy revealed GG2-3 disease, 2/12 cores involved, . PSMA PET revealed activity in prostate. There were some equivocal LNs in the inguinal area.  He had a UTI after the bx and was on antibiotics.  2024 tumor board review, consensus the inguinal LNs were a false positive. He was seen by Dr Mahmood and referred to rad onc.   2024 he reports to rad onc. CHANTELLE 15-20. IPSS 8. QOL 3. No GI sx. He is asymptomatic. He feels well.    Prior Radiotherapy:  No  Radiation Treatments       No radiation treatments to show. (Treatments may have been administered in another system.)          Current Systemic Treatment:  No     Presence of Pacemaker or ICD:  No    Past Medical History:    Past Medical History:   Diagnosis Date    Diabetes mellitus (CMS/HCC)     \"pre-diabetic\" per pt    Gout     History of stomach ulcers     d/t daily aleve per pt    History of transcatheter aortic valve replacement (TAVR)     Hyperlipemia     Hypertension  " "   Hypothyroidism     Obstructive sleep apnea treated with continuous positive airway pressure (CPAP)     Sleep apnea    TAVR, HLD, HTN,  DM, Gout, Hypothyroid, ED visit 1/24 - MRI possible NPH , Basal Cell carcinoma,         Past Surgical History:    Past Surgical History:   Procedure Laterality Date    AORTIC VALVE REPLACEMENT N/A     CARPAL TUNNEL RELEASE Bilateral     ROTATOR CUFF REPAIR Bilateral     THUMB AMPUTATION Left     d/t trauma        Family History:  Cancer-related family history includes Lung cancer in his brother and father.    Social History:    Social History     Tobacco Use    Smoking status: Never     Passive exposure: Never    Smokeless tobacco: Never   Vaping Use    Vaping Use: Never used   Substance Use Topics    Alcohol use: Yes     Alcohol/week: 4.0 standard drinks of alcohol     Types: 2 Cans of beer, 2 Shots of liquor per week    Drug use: Never       Allergies:    Allergies   Allergen Reactions    Cephalosporins Rash     \"Skin peels off\"    Penicillin Rash     \"Skin peels off\"        Medications:    Current Outpatient Medications:     acetaminophen (Tylenol) 325 mg tablet, Take 2 tablets (650 mg) by mouth every 6 hours if needed for mild pain (1 - 3)., Disp: , Rfl:     allopurinol (Zyloprim) 300 mg tablet, Take 1 tablet (300 mg) by mouth once daily., Disp: , Rfl:     aspirin 81 mg chewable tablet, Chew 1 tablet (81 mg) once daily., Disp: , Rfl:     azithromycin (Zithromax) 500 mg tablet, TAKE 1 TABLET BY MOUTH 30-60 MINUTES BEFORE YOUR DENTAL PROCEDURE., Disp: , Rfl:     carboxymethylcellulose (Refresh Celluvisc) 1 % ophthalmic solution dropperette, Administer 1 drop into affected eye(s) 2 times a day. (AM and PM), Disp: , Rfl:     diphenhydrAMINE (BENADryl) 50 mg capsule, Take 1 capsule (50 mg) by mouth once daily at bedtime., Disp: , Rfl:     glucosam/chond/hyalu/CF borate (MOVE FREE JOINT HEALTH ORAL), Take by mouth. As directed, Disp: , Rfl:     hydroCHLOROthiazide (HYDRODiuril) 12.5 " mg tablet, Take 1 tablet (12.5 mg) by mouth once daily., Disp: , Rfl:     levothyroxine (Synthroid, Levoxyl) 150 mcg tablet, Take 1 tablet (150 mcg) by mouth once daily in the morning. Take before meals., Disp: , Rfl:     metFORMIN XR (Glucophage-XR) 500 mg 24 hr tablet, Take 1 tablet (500 mg) by mouth once daily. With meal, Disp: , Rfl:     NIFEdipine CC 60 mg 24 hr tablet, Take 1 tablet (60 mg) by mouth once daily., Disp: , Rfl:     omeprazole (PriLOSEC) 40 mg DR capsule, Take 1 capsule (40 mg) by mouth every other day., Disp: , Rfl:     simvastatin (Zocor) 10 mg tablet, Take 1 tablet (10 mg) by mouth once daily., Disp: , Rfl:       Review of Systems:  Review of Systems - Oncology  The patient's current pain level was assessed.  They report currently having a pain of 0 out of 10.  They feel their pain is under control without the use of pain medications.    Performance Status:  The Karnofsky performance scale today is 100, Fully active, able to carry on all pre-disease performed without restriction (ECOG equivalent 0).   General: Negative for weight changes, night sweats, fever, recent trauma.   Eyes: Negative for blurriness, eye pain, blind spots.   Ears, nose, mouth, throat: Negative for changes in hearing, changes in taste, sore throat, mouth sores.  Cardiovascular: Negative for palpitations, chest pain, tightness.   Pulmonary: Negative for shortness of breath, cough, sputum.   Gastrointestinal: Negative for indigestion, diarrhea, constipation, abdominal pain, blood in stool, nausea/vomiting.   Genitourinary: Per HPI.   Neuro: Negative for numbness, tingling, weakness, changes in speech.   Musculoskeletal: Negative for muscle pain, joint pain, edema, tenderness.   Endocrine: Negative for hot flashes.   Other: All others negative.       OBJECTIVE  Physical Exam:  /81 (BP Location: Left arm, Patient Position: Sitting)   Pulse 67   Temp 35.9 °C (96.6 °F)   Resp 18   Wt 135 kg (298 lb 9.8 oz)   SpO2 96%    BMI 43.48 kg/m²    Physical Exam   General: The patient appears well and is in no acute distress.   Cognition: Acceptable understanding of the essential elements of the medical situation.   Speech: Fluent and coherent.   Eyes: No conjunctival infection. Anicteric. Extraocular movements intact.   Head, ears, nose, mouth, throat: Atraumatic. Moist mucous membranes. Trachea midline.   Lymphatic: No visible cervical lymphadenopathy.   Cardiovascular: No visible jugular venous distention. Skin perfused.   Pulmonary: Breathes comfortably on room air without stridor or cough.   Abdomen: Nondistended.   Extremities: No edema or muscle wasting.   Neurologic: Cranial nerves II-XII are grossly intact. Moves all extremities. No gross focal deficits.   Rectal: previously performed and was WNL, consistent w T1c disease.  Psychiatric: Mood is appropriate.   Skin: No visible rash or ulcers.     Laboratory Review:  There are no laboratory contraindications to radiation therapy.    The pertinent lab results were reviewed and discussed with the patient.  Notably,   Lab Results   Component Value Date    PSA 6.43 (H) 10/16/2023    PSA 6.1 (H) 09/14/2023    PSA 5.5 (H) 09/06/2022         Pathology Review:  The pertinent pathology results were reviewed and discussed with the patient.  Notably,   A.  PROSTATE, AREA OF INTEREST #1, NEEDLE BIOPSIES:  --ADENOCARCINOMA, CRESENCIO PATTERN 3 + 4 = 7, GRADE GROUP 2, INVOLVING 4 OF MULTIPLE NEEDLE CORE FRAGMENTS, AND APPROXIMATELY 25% OF THE SPECIMEN. SEE NOTE.     Note: Immunostain for PIN-4 confirms the absence of basal cells in the malignant acini.     B.  PROSTATE, AREA OF INTEREST #2, NEEDLE BIOPSIES:  --ADENOCARCINOMA, CRESENCIO PATTERN 4 + 3 = 7, GRADE GROUP 3, INVOLVING 3 OF 3 CORES, AND APPROXIMATELY 35% OF THE SPECIMEN.      : Dr. Aretha Jose reviewed part B, and concurs with the diagnosis for part B.     C., D., E., F., G., H., I., J.  PROSTATE, LEFT BASE LATERAL, LEFT  BASE MEDIAL, LEFT MID LATERAL, LEFT MID MEDIAL, LEFT APEX LATERAL, LEFT APEX MEDIAL, RIGHT BASE LATERAL, RIGHT APEX MEDIAL, NEEDLE BIOPSIES:  --PROSTATE TISSUE WITH NO EVIDENCE OF MALIGNANCY.     K.  PROSTATE, RIGHT APEX LATERAL, NEEDLE BIOPSY:  --FOCUS OF ATYPICAL SMALL ACINAR PROLIFERATION, SUSPICIOUS FOR BUT NOT DIAGNOSTIC OF MALIGNANCY.     L., M., N.  PROSTATE, RIGHT MID MEDIAL, RIGHT MID LATERAL, RIGHT BASE MEDIAL, NEEDLE BIOPSIES:  --PROSTATE TISSUE WITH NO EVIDENCE OF MALIGNANCY.     Imaging:  The pertinent imaging results were reviewed and discussed with the patient.  Notably,     MR PROSTATE MIGUEL BOUNDARIES; 12/1/2023 12:12 pm      INDICATION:  Elevated PSA      PSA: 6.4NG/ML. (Normal range 0.0-4.1 NG/ML) PSA Date: .  [place] Score: .  Biopsy Date: None. Result: .  Biopsy Date: . Result: .      COMPARISON:  None      ACCESSION NUMBER(S):  [id number]      ORDERING CLINICIAN:  JEN RAINEY      TECHNIQUE:  Multiplanar diffusion, T1 and T2 weighted images were obtained.  Examination is PI-RADS compliant. The study was performed with and  without 20 ml of Dotarem. 3.0  Brynn magnet was utilized. Body  surface coil was utilized for imaging.      FINDINGS:  PROSTATE:      Size: 4.0 x 3.5 x 3.5  cm x 0.52= 28.7 ml  PSA DENSITY: 0.22  Hemorrhage present: None          PROSTATE LESIONS:      Peripheral Zone Assessment:      There is a 6 mm area of restricted diffusion seen at the  apex/midgland level best seen on axial image 21 through 24 within the  posteromedial sector of the left peripheral zone. No associated  enhancement is seen. There are hazy dark bands of T2 signal seen  peripherally throughout the prostate.      LESION PI-RADS Category: 4          Transition Zone Assessment:      There is an enhancing 1.6 x 0.9 cm lenticular lesion identified with  corresponding restricted diffusion best seen on axial image 22/34  with corresponding low T2 signal. This involves the anterior right  transition zone at the  midgland and apex levels. This may also  involve the right anterior peripheral zone. This appears to be  intimately associated with the anterior fibromuscular stroma.      Additional mild expansion of the transition zone can be seen with  other well-defined nodules present.      LESION PI-RADS Category: 5      Neurovascular Bundles (NVB): Unremarkable.      Seminal Vesicles: Unremarkable.      Lymph Nodes Pelvic: No suspicious nodes.      Skeletal Involvement: No marrow replacement process.      Other Pelvic Organs: None      IMPRESSION:  1. Enhancing lenticular lesion within the right transition zone and  anterior right peripheral zone of the prostate at the midgland to  apex level concerning for neoplasm.  2. PI-RADS 5.  3. Additional small 5-6 mm focus of restricted diffusion within the  left peripheral zone at the midgland to apex level of the  posteromedial sector.  4. Sequela of chronic prostatitis.  5. BPH.  6. Lesions were marked in DynaCAD.         PSMA PET    INDICATION:  Signs/Symptoms:malignant neoplasm of prostate. Recently diagnosed  prostate cancer.      COMPARISON:  MRI of the prostate gland from 12/01/2023      ACCESSION NUMBER(S):  [id number]      ORDERING CLINICIAN:  JEN RAINEY      TECHNIQUE:  DIVISION OF NUCLEAR MEDICINE  POSITRON EMISSION TOMOGRAPHY (PET-CT)      The patient received an intravenous dose of 5.5 mCi of gallium 68 PSMA  Positron emission tomographic (PET) images from mid-thigh to skull  vertex were then acquired after a delay of approximately 60 minutes.  Also acquired was a contemporaneous noncontrast CT scan performed for  attenuation correction of PET images and anatomic localization.  The  PET and CT images were digitally fused for display.  All images were  acquired on a combined PET-CT scanner unit.  Some areas of PSMA  accumulation may be described in standardized uptake value (SUV)  units.      FINDINGS:  NECK AND CHEST:  No abnormal focal PSMA uptake.  Physiologic  activity is seen within the bilateral parotid and  submandibular glands.      ABDOMEN:  There is no abnormal  PSMA uptake within abdominal lymph nodes.  Intensely increased PSMA uptake within the liver and the spleen,  which is physiologic. Physiologic activity is noted within bilateral  kidneys. Physiologic tracer excretion into the small bowel.      PELVIS:  Intensely focal increased PSMA uptake seen in the prostate gland,  mostly on the right side and to a lesser degree on the left side  inferiorly with an SUV of 7.30 Additional foci of increased PSMA  uptake are seen within bilateral inguinal lymph nodes, with maximum  SUV up to 3.1, however these lymph nodes appear to be normal in size.  No hypermetabolic lymph nodes are seen adjacent to the prostate gland  or within the pelvis/retroperitoneum.      MUSCULOSKELETAL:  There are no abnormal foci of increased  PSMA activity seen  throughout the axial and appendicular skeleton.      IMPRESSION:  1.    Focally increased PSMA expression within the  prostate gland  bilaterally, right more than left, consistent with the known primary  prostatic malignancy.  2.     Mild hypermetabolic activity within small bilateral inguinal  lymph nodes, potentially early metastatic disease; correlate  clinically and follow-up as needed. No enlarged lymph nodes seen.  3.     There is no evidence for  PSMA-avid metastatic disease in the  neck, chest and abdomen or in the osseous structures.    ASSESSMENT:  Jerald Sandoval is a 73 y.o. male with prostate adenocarcinoma. Stage II cT1c N0 M0 GG3 2/12 cores involved. PSA 6.4. 23 ml prostate. NCCN unfavorable intermediate risk because of GG3. IPSS 8. QOL 3. CHANTELLE ~20.    PLAN:    We discussed the likely natural history of progression of his prostate cancer.     Treatment options   We discussed the treatment options available for prostate cancer based on the NCCN guidelines, https://www.nccn.org/professionals/physician_gls/pdf/prostate.pdf,  "version 4.2023 PROS-6 for UIR.    Based on his age and mediocre health, he may consider active surveillance. His BMI is ~33, and he has some other medical comorbidities, including diabetes, sleep apnea, microvascular disease of the brain (seen on MRI). He and his wife are aiming to decrease his risk of other cause mortality with a diet and exercise program.    He has discussed surgical options with a urologist and he is not an ideal candidate for a prostatectomy.     Radiation options include external beam radiation therapy (EBRT) with intensity modulated radiation therapy (IMRT) alone; or brachytherapy (BT), with either low dose rate (LDR-BT) seed implant with iodine 125, or high dose rate brachytherapy (HDR-BT) with iridium 192. We discussed the rationale, indications, contraindications, logistics, and expected toxicities for these therapies.     External beam radiation therapy (EBRT)   With respect to EBRT, I explained the difference between 3D conformal RT and IMRT alone and the fact that there is better radiation dose homogeneity and fewer bladder and rectal complications with IMRT compared to 3D conformal RT.   Historically, EBRT has been delivered and 1.8-2 Gy fractions over a course of about 8-9 weeks. Hypofractionated techniques with EBRT, including \"moderate\" hypofractionation and \"extreme\" hypofractionation, i.e. stereotactic body radiation therapy (SBRT), are treatment options in select patients. He is a candidate for moderate and extreme hypofractionation.     We discussed the benefits and risks of a course of radiation therapy, which include, but are not limited to, increased daytime urinary frequency, urgency, nocturia, dysuria, hematuria, loosening of the stools, increase in the number of bowel movements, rectal bleeding, rectal discomfort and fatigue. The potential late complications were also explained which include, but are not limited to, chronic cystitis, chronic proctitis, a 15% risk of rectal " "bleeding, a 2-3% risk of severe rectal bleeding, a less than 1% risk of bowel or bladder injury requiring surgical intervention, a less than 1% risk of urinary incontinence, and a 50-70% potency preservation rate. Additional possible complications with a radiation implant include bleeding, infection, headaches from anesthesia, and long term indwelling Gorman catheter.     With respect to planning for radiation therapy, we discussed that the patient would need a bowel prep prior to the day of simulation, and the detailed instructions were provided.     We discussed the use of spacer to minimize risk of rectal toxicity, but he would not like to receive this given his UTI from the biopsy. In this case, I suggested we treat him with moderate rather than extreme hypofractionation.    We discussed the use of photon vs proton radiation therapy. I estimate his chance of cancer control is relatively similar between photons vs protons. I did not recommend proton radiation therapy outside of a clinical trial.    Image guidance for EBRT   I discussed the benefits and risks of various options available for image guided radiation therapy (IGRT), including 2D/3D imaging with conebeam CT (CBCT), implanted gold seed fiducials, and 4D localization with electromagnetic transponders (i.e. [place]). If he were to proceed with RT, we would most likely use CBCT for IGRT.     Brachytherapy  Brachytherapy is a treatment option in select patients with prostate cancer. We discussed the use of brachytherapy monotherapy and combination therapies of brachytherapy plus EBRT (i.e. \"brachytherapy boost\") in detail. I did not recommend brachytherapy given his LUTS and BMI.    Other local therapy options   I did not recommend cryosurgery due to its lack of efficacy and increased toxicity. I did not recommend HIFU due to its investigational status in the [place] and lack of long-term data.     Hormone therapy / androgen deprivation therapy (ADT) "   We discussed the benefits of ADT for his prostate cancer.     We discussed the side effects of a course of ADT, which include, but are not limited to, fatigue, hot flashes, weight gain, impotence, decreased libido, anemia, osteopenia, depression, possible increased risk of liver damage, osteoporosis, muscle weakness, gynecomastia and cardiac problems.     Given his risk group, I recommended ADT for 4-6 mos. He is eligible for NRG  010, which helps tailor ADT per his Decipher score, and we are enrolling him onto this trial.    Treatment for this patient   After discussion of all options, the therapy that would be most efficacious and maintain quality of life for this patient is definitive IMRT/IGRT with ADT per NRG  010.    The patient signed consents for treatment today. Simulation will be scheduled.     The length of the visit was 60 minutes. We spent more than 50% of this time discussing treatment options with the patient. All of the patient's questions were answered. I gave the patient my contact information.     Miguel Ángel Grayson MD MS   Department of Radiation Oncology

## 2024-02-20 ENCOUNTER — APPOINTMENT (OUTPATIENT)
Dept: HEMATOLOGY/ONCOLOGY | Facility: HOSPITAL | Age: 74
End: 2024-02-20
Payer: MEDICARE

## 2024-02-21 ENCOUNTER — HOSPITAL ENCOUNTER (OUTPATIENT)
Dept: RADIOLOGY | Facility: EXTERNAL LOCATION | Age: 74
Discharge: HOME | End: 2024-02-21

## 2024-02-21 DIAGNOSIS — C61 MALIGNANT NEOPLASM OF PROSTATE (MULTI): ICD-10-CM

## 2024-02-22 ENCOUNTER — TELEPHONE (OUTPATIENT)
Dept: RADIATION ONCOLOGY | Facility: HOSPITAL | Age: 74
End: 2024-02-22

## 2024-02-23 NOTE — TELEPHONE ENCOUNTER
Spoke to Mr Sandoval this morning. He had BM last night after a few days of just miralax he added a stool softener yesterday. Patient plans to keep doing that routine over the weekend to prepare for Monday's CT sim. Spoke about the potential of enema for Monday if he is unable to keep up with bowel movements over the weekend. Patient verbalized understanding.

## 2024-02-25 NOTE — PROGRESS NOTES
Oncology New Patient Visit  Patient ID: Jerald Sandoval is a 73 y.o. male who presents today to [facility] care.  Referring Physician: Stan Mahmood MD  [id number] [place] [address]  Primary Care Provider: MD Jeremi Espinoza N Rad onc    Cancer History  Prostate Cancer - Unfav Intd  Treatment  -elevated PSA, I PSA 6.63, MRI 11/23 - lesion in right transition zone, right peripheral zone, non specific focus in left peripheral zone, Biopsy 12/29/23 -lesions 3+4 equal 7 grade group 2 involving 4 cores, Gracey's 4+3 grade group 3 involving 3 cores, PET PSMA -focally increased expression within the prostate gland right greater than left mild hypermetabolic activity within small bilateral inguinal lymph nodes SUV 3.1  / no other mets,reviewed in TB and inguinal nodes thought reactive not c/w mets  -refused trial, and eligard 6m     Other contributing history  TAVR, HLD, HTN,  DM, Gout, Hypothyroid, ED visit 1/24 - MRI possible NPH , Basal Cell carcinoma,     HPI  The patient is overall doing well without any other major new complaints.  Patient denies any significant new issues.  Denies any nausea, vomiting, fevers, chills, easy bruising or bleeding denies any chest pain or chest tightness.  Appetite and energy is otherwise stable.  Denies any melena or bright red blood per rectum.  Denies any other new symptoms.  His lower extremity edema continues to improve.  Denies any other major new symptoms.    ROS:  10-pt ROS reviewed and negative except as mentioned above.    Medications: below was reviewed and is accurate  Current Outpatient Medications   Medication Instructions    acetaminophen (Tylenol) 325 mg tablet 2 tablets, oral, Every 6 hours PRN    allopurinol (Zyloprim) 300 mg tablet 1 tablet, oral, Daily    aspirin 81 mg chewable tablet 1 tablet, oral, Daily    azithromycin (Zithromax) 500 mg tablet TAKE 1 TABLET BY MOUTH 30-60 MINUTES  BEFORE YOUR DENTAL PROCEDURE.    carboxymethylcellulose (Refresh Celluvisc) 1 % ophthalmic solution dropperette 1 drop, ophthalmic (eye), 2 times daily, (AM and PM)    diphenhydrAMINE (BENADryl) 50 mg capsule 1 capsule, oral, Nightly    glucosam/chond/hyalu/CF borate (MOVE FREE JOINT HEALTH ORAL) oral, As directed    hydroCHLOROthiazide (HYDRODiuril) 12.5 mg tablet 1 tablet, oral, Daily    levothyroxine (Synthroid, Levoxyl) 150 mcg tablet 1 tablet, oral, Daily before breakfast    metFORMIN XR (Glucophage-XR) 500 mg 24 hr tablet 1 tablet, oral, Daily, With meal    NIFEdipine CC 60 mg 24 hr tablet 1 tablet, oral, Daily    omeprazole (PriLOSEC) 40 mg DR capsule 1 capsule, oral, Every 48 hours    simvastatin (Zocor) 10 mg tablet 1 tablet, oral, Daily        Past Medical/ Social / Surgical / Family history reviewed    Physical exam:  Vitals:    02/26/24 1216   BP: 138/72   BP Location: Left arm   Patient Position: Sitting   BP Cuff Size: Adult long   Pulse: 87   Resp: 18   Temp: 35.9 °C (96.6 °F)   TempSrc: Temporal   SpO2: 96%   Weight: 133 kg (292 lb 8.8 oz)     GEN: NAD, well-appearing  HEENT: no clear lesions seen  NECK: no clear masses  LYMPH: no palpable adenopathy  SKIN: no concerning new lesions, some areas of well-healed surgical scars and keratoses appreciated - stable  LUNGS: CTA 2/6 HSM  CV: RRR no clear R/G  ABD: Soft, NTND. No rebound or guarding.  EXT: Symmetric lower extremity edema - minimal improvement  NEURO: Grossly intact. No focal deficits.  PSYCH: Normal mood and affect    Radiology review  Reviewed in detail personally and for detail see results in EPIC        Genomics Data    Laboratory review  Reviewed in detail personally and for detail see results in EPIC  Lab Results   Component Value Date    WBC 10.8 01/12/2024    WBC 5.4 09/14/2023    WBC 4.5 02/22/2023     Lab Results   Component Value Date    HGB 13.9 01/12/2024    HGB 14.2 09/14/2023    HGB 12.5 (L) 02/22/2023     Lab Results   Component  Value Date     01/12/2024     09/14/2023     02/22/2023     Lab Results   Component Value Date    GLUCOSE 157 (H) 01/12/2024    CALCIUM 9.6 01/12/2024     01/12/2024    K 4.0 01/12/2024    CO2 28 01/12/2024    CL 99 01/12/2024    BUN 14 01/12/2024    CREATININE 1.37 (H) 01/12/2024     Lab Results   Component Value Date    PSA 6.43 (H) 10/16/2023    PSA 6.1 (H) 09/14/2023    PSA 5.5 (H) 09/06/2022     Lab Results   Component Value Date    ALT 15 01/12/2024    AST 16 01/12/2024    ALKPHOS 77 01/12/2024    BILITOT 1.1 01/12/2024       ASSESSMENT AND PLAN     Prostate Cancer -discussed in detail options including the role of ADT and radiation 6 months of ADT, surgical approach, clinical trial looking at the role of the decipher score to help guide therapy based on risk and randomized to 6 months of ADT for 6 months of ADT and systemic treatment risk benefits discussed in detail and options and the patient decided to proceed with out the clinical study and just plan on ADT alone 6 months of treatment we will arrange for patient to have his injection discussed also the potential role of relugolix and wanted to proceed with the injection.  Will arrange send follow-up in 3 months with labs prior he will contact us for any other new symptoms.  Monitor hot flashes and toxicities. Discussed natural history of this cancer and treatment options.  Discussed ASE and rba of ADT therapy including hot flashes, bone density changes, body habitus changes and loss of muscle mass, loss of libido, and ED.  We also discussed orcheictomy as an alternative to medical castration.  He agrees to start with medical castration for now and discussed the need to maintain adequate cardiovascular health, exercise, bone health with calcium 1000 mg with vitamin D 400-800 international units.  Continue to f/up with radiation oncology team.  Lower extremity edema-improved f/up with PCP   CNS findings-nonspecific recommend  follow-up with primary care physician and neurologist      Stan Mahmood MD  Senior Attending Physician/  in Medicine [facility] [employer]  [facility] / [facility]  Patient line [phone]  Fax [phone]

## 2024-02-26 ENCOUNTER — HOSPITAL ENCOUNTER (OUTPATIENT)
Dept: RADIATION ONCOLOGY | Facility: CLINIC | Age: 74
Setting detail: RADIATION/ONCOLOGY SERIES
Discharge: HOME | End: 2024-02-26
Payer: MEDICARE

## 2024-02-26 ENCOUNTER — OFFICE VISIT (OUTPATIENT)
Dept: HEMATOLOGY/ONCOLOGY | Facility: CLINIC | Age: 74
End: 2024-02-26
Payer: MEDICARE

## 2024-02-26 VITALS
TEMPERATURE: 96.6 F | SYSTOLIC BLOOD PRESSURE: 138 MMHG | WEIGHT: 292.55 LBS | OXYGEN SATURATION: 96 % | RESPIRATION RATE: 18 BRPM | HEART RATE: 87 BPM | BODY MASS INDEX: 42.6 KG/M2 | DIASTOLIC BLOOD PRESSURE: 72 MMHG

## 2024-02-26 DIAGNOSIS — C61 MALIGNANT NEOPLASM OF PROSTATE (MULTI): Primary | ICD-10-CM

## 2024-02-26 DIAGNOSIS — C61 PROSTATE CANCER (MULTI): ICD-10-CM

## 2024-02-26 PROCEDURE — 99214 OFFICE O/P EST MOD 30 MIN: CPT | Performed by: INTERNAL MEDICINE

## 2024-02-26 PROCEDURE — 1126F AMNT PAIN NOTED NONE PRSNT: CPT | Performed by: INTERNAL MEDICINE

## 2024-02-26 PROCEDURE — 1157F ADVNC CARE PLAN IN RCRD: CPT | Performed by: INTERNAL MEDICINE

## 2024-02-26 PROCEDURE — 3075F SYST BP GE 130 - 139MM HG: CPT | Performed by: INTERNAL MEDICINE

## 2024-02-26 PROCEDURE — 1159F MED LIST DOCD IN RCRD: CPT | Performed by: INTERNAL MEDICINE

## 2024-02-26 PROCEDURE — 1160F RVW MEDS BY RX/DR IN RCRD: CPT | Performed by: INTERNAL MEDICINE

## 2024-02-26 PROCEDURE — 77334 RADIATION TREATMENT AID(S): CPT | Performed by: RADIOLOGY

## 2024-02-26 PROCEDURE — 3078F DIAST BP <80 MM HG: CPT | Performed by: INTERNAL MEDICINE

## 2024-02-26 PROCEDURE — 1036F TOBACCO NON-USER: CPT | Performed by: INTERNAL MEDICINE

## 2024-02-26 RX ORDER — ALBUTEROL SULFATE 0.83 MG/ML
3 SOLUTION RESPIRATORY (INHALATION) AS NEEDED
Status: CANCELLED | OUTPATIENT
Start: 2024-03-07

## 2024-02-26 RX ORDER — FAMOTIDINE 10 MG/ML
20 INJECTION INTRAVENOUS ONCE AS NEEDED
Status: CANCELLED | OUTPATIENT
Start: 2024-03-07

## 2024-02-26 RX ORDER — EPINEPHRINE 0.3 MG/.3ML
0.3 INJECTION SUBCUTANEOUS EVERY 5 MIN PRN
Status: CANCELLED | OUTPATIENT
Start: 2024-03-07

## 2024-02-26 RX ORDER — DIPHENHYDRAMINE HYDROCHLORIDE 50 MG/ML
50 INJECTION INTRAMUSCULAR; INTRAVENOUS AS NEEDED
Status: CANCELLED | OUTPATIENT
Start: 2024-03-07

## 2024-02-26 ASSESSMENT — PAIN SCALES - GENERAL: PAINLEVEL: 0-NO PAIN

## 2024-02-26 ASSESSMENT — PATIENT HEALTH QUESTIONNAIRE - PHQ9
2. FEELING DOWN, DEPRESSED OR HOPELESS: NOT AT ALL
SUM OF ALL RESPONSES TO PHQ9 QUESTIONS 1 AND 2: 0
1. LITTLE INTEREST OR PLEASURE IN DOING THINGS: NOT AT ALL

## 2024-02-26 ASSESSMENT — ENCOUNTER SYMPTOMS
DEPRESSION: 0
OCCASIONAL FEELINGS OF UNSTEADINESS: 0
LOSS OF SENSATION IN FEET: 0

## 2024-02-26 NOTE — PROGRESS NOTES
CT sim. Pt was educated on what to expect for CT sim, what to expect when xRT starts, OTV schedule, side effects, oriented to waiting area, changing rooms, and lockers. Patient verbalized full understanding of full bladder and empty rectum. All questions answered. Verbalized understanding using teach back. No further concerns at this time.

## 2024-02-26 NOTE — PROGRESS NOTES
Patient here for follow up with Dr. Mahmood; seen for prostate cancer.    Reconciled and reviewed medication and allergy list with patient.     Per MD, patient is declining participation in clinical trial.    Patient to return on 3/7/24 for Eligard injection and then return in 3 months for follow up with MD having labs drawn several days prior .    Reviewed AVS with patient.      No barriers to education noted, pt. Agreed to plan and verbalized understanding using teach back method

## 2024-02-27 ENCOUNTER — APPOINTMENT (OUTPATIENT)
Dept: HEMATOLOGY/ONCOLOGY | Facility: HOSPITAL | Age: 74
End: 2024-02-27
Payer: MEDICARE

## 2024-02-27 ENCOUNTER — TUMOR BOARD CONFERENCE (OUTPATIENT)
Dept: HEMATOLOGY/ONCOLOGY | Facility: HOSPITAL | Age: 74
End: 2024-02-27
Payer: MEDICARE

## 2024-02-29 ENCOUNTER — TELEPHONE (OUTPATIENT)
Dept: HEMATOLOGY/ONCOLOGY | Facility: CLINIC | Age: 74
End: 2024-02-29

## 2024-02-29 PROBLEM — I35.0 AORTIC STENOSIS: Status: RESOLVED | Noted: 2023-08-21 | Resolved: 2024-02-29

## 2024-02-29 NOTE — TELEPHONE ENCOUNTER
Per Dr. Mahmood, no contraindication for the Echo and Lupron inj same day.  I spoke to Jerald who did a teachback.

## 2024-03-01 ENCOUNTER — APPOINTMENT (OUTPATIENT)
Dept: RADIATION ONCOLOGY | Facility: CLINIC | Age: 74
End: 2024-03-01
Payer: MEDICARE

## 2024-03-01 ENCOUNTER — TELEPHONE (OUTPATIENT)
Dept: RADIATION ONCOLOGY | Facility: CLINIC | Age: 74
End: 2024-03-01
Payer: MEDICARE

## 2024-03-01 ENCOUNTER — HOSPITAL ENCOUNTER (OUTPATIENT)
Dept: RADIATION ONCOLOGY | Facility: CLINIC | Age: 74
Setting detail: RADIATION/ONCOLOGY SERIES
Discharge: HOME | End: 2024-03-01
Payer: MEDICARE

## 2024-03-01 PROCEDURE — 77301 RADIOTHERAPY DOSE PLAN IMRT: CPT | Performed by: RADIOLOGY

## 2024-03-01 PROCEDURE — 77338 DESIGN MLC DEVICE FOR IMRT: CPT | Performed by: RADIOLOGY

## 2024-03-01 PROCEDURE — 77300 RADIATION THERAPY DOSE PLAN: CPT | Performed by: RADIOLOGY

## 2024-03-01 NOTE — TELEPHONE ENCOUNTER
Called to schedule pt first radiation appt. No answer, left message per NOPP form, asking pt to return call to schedule.

## 2024-03-04 ENCOUNTER — HOSPITAL ENCOUNTER (OUTPATIENT)
Dept: RADIATION ONCOLOGY | Facility: CLINIC | Age: 74
Setting detail: RADIATION/ONCOLOGY SERIES
Discharge: HOME | End: 2024-03-04
Payer: MEDICARE

## 2024-03-04 DIAGNOSIS — Z51.0 ENCOUNTER FOR ANTINEOPLASTIC RADIATION THERAPY: ICD-10-CM

## 2024-03-04 DIAGNOSIS — C61 MALIGNANT NEOPLASM OF PROSTATE (MULTI): ICD-10-CM

## 2024-03-04 LAB
RAD ONC MSQ ACTUAL FRACTIONS DELIVERED: 1
RAD ONC MSQ ACTUAL SESSION DELIVERED DOSE: 300 CGRAY
RAD ONC MSQ ACTUAL TOTAL DOSE: 300 CGRAY
RAD ONC MSQ ELAPSED DAYS: 0
RAD ONC MSQ LAST DATE: NORMAL
RAD ONC MSQ PRESCRIBED FRACTIONAL DOSE: 300 CGRAY
RAD ONC MSQ PRESCRIBED NUMBER OF FRACTIONS: 20
RAD ONC MSQ PRESCRIBED TECHNIQUE: NORMAL
RAD ONC MSQ PRESCRIBED TOTAL DOSE: 6000 CGRAY
RAD ONC MSQ PRESCRIPTION PATTERN COMMENT: NORMAL
RAD ONC MSQ START DATE: NORMAL
RAD ONC MSQ TREATMENT COURSE NUMBER: 1
RAD ONC MSQ TREATMENT SITE: NORMAL

## 2024-03-04 PROCEDURE — 77014 CHG CT GUIDANCE RADIATION THERAPY FLDS PLACEMENT: CPT | Performed by: RADIOLOGY

## 2024-03-04 PROCEDURE — 77385 HC INTENSITY-MODULATED RADIATION THERAPY (IMRT), SIMPLE: CPT | Performed by: RADIOLOGY

## 2024-03-05 ENCOUNTER — HOSPITAL ENCOUNTER (OUTPATIENT)
Dept: RADIATION ONCOLOGY | Facility: CLINIC | Age: 74
Setting detail: RADIATION/ONCOLOGY SERIES
Discharge: HOME | End: 2024-03-05
Payer: MEDICARE

## 2024-03-05 DIAGNOSIS — Z51.0 ENCOUNTER FOR ANTINEOPLASTIC RADIATION THERAPY: ICD-10-CM

## 2024-03-05 DIAGNOSIS — C61 MALIGNANT NEOPLASM OF PROSTATE (MULTI): ICD-10-CM

## 2024-03-05 LAB
RAD ONC MSQ ACTUAL FRACTIONS DELIVERED: 2
RAD ONC MSQ ACTUAL SESSION DELIVERED DOSE: 300 CGRAY
RAD ONC MSQ ACTUAL TOTAL DOSE: 600 CGRAY
RAD ONC MSQ ELAPSED DAYS: 1
RAD ONC MSQ LAST DATE: NORMAL
RAD ONC MSQ PRESCRIBED FRACTIONAL DOSE: 300 CGRAY
RAD ONC MSQ PRESCRIBED NUMBER OF FRACTIONS: 20
RAD ONC MSQ PRESCRIBED TECHNIQUE: NORMAL
RAD ONC MSQ PRESCRIBED TOTAL DOSE: 6000 CGRAY
RAD ONC MSQ PRESCRIPTION PATTERN COMMENT: NORMAL
RAD ONC MSQ START DATE: NORMAL
RAD ONC MSQ TREATMENT COURSE NUMBER: 1
RAD ONC MSQ TREATMENT SITE: NORMAL

## 2024-03-05 PROCEDURE — 77336 RADIATION PHYSICS CONSULT: CPT | Performed by: RADIOLOGY

## 2024-03-05 PROCEDURE — 77014 CHG CT GUIDANCE RADIATION THERAPY FLDS PLACEMENT: CPT | Performed by: RADIOLOGY

## 2024-03-05 PROCEDURE — 77385 HC INTENSITY-MODULATED RADIATION THERAPY (IMRT), SIMPLE: CPT | Performed by: RADIOLOGY

## 2024-03-06 ENCOUNTER — HOSPITAL ENCOUNTER (OUTPATIENT)
Dept: RADIATION ONCOLOGY | Facility: CLINIC | Age: 74
Setting detail: RADIATION/ONCOLOGY SERIES
Discharge: HOME | End: 2024-03-06
Payer: MEDICARE

## 2024-03-06 DIAGNOSIS — C61 MALIGNANT NEOPLASM OF PROSTATE (MULTI): ICD-10-CM

## 2024-03-06 DIAGNOSIS — Z51.0 ENCOUNTER FOR ANTINEOPLASTIC RADIATION THERAPY: ICD-10-CM

## 2024-03-06 LAB
RAD ONC MSQ ACTUAL FRACTIONS DELIVERED: 3
RAD ONC MSQ ACTUAL SESSION DELIVERED DOSE: 300 CGRAY
RAD ONC MSQ ACTUAL TOTAL DOSE: 900 CGRAY
RAD ONC MSQ ELAPSED DAYS: 2
RAD ONC MSQ LAST DATE: NORMAL
RAD ONC MSQ PRESCRIBED FRACTIONAL DOSE: 300 CGRAY
RAD ONC MSQ PRESCRIBED NUMBER OF FRACTIONS: 20
RAD ONC MSQ PRESCRIBED TECHNIQUE: NORMAL
RAD ONC MSQ PRESCRIBED TOTAL DOSE: 6000 CGRAY
RAD ONC MSQ PRESCRIPTION PATTERN COMMENT: NORMAL
RAD ONC MSQ START DATE: NORMAL
RAD ONC MSQ TREATMENT COURSE NUMBER: 1
RAD ONC MSQ TREATMENT SITE: NORMAL

## 2024-03-06 PROCEDURE — 77014 CHG CT GUIDANCE RADIATION THERAPY FLDS PLACEMENT: CPT | Performed by: RADIOLOGY

## 2024-03-06 PROCEDURE — 77385 HC INTENSITY-MODULATED RADIATION THERAPY (IMRT), SIMPLE: CPT | Performed by: RADIOLOGY

## 2024-03-07 ENCOUNTER — HOSPITAL ENCOUNTER (OUTPATIENT)
Dept: CARDIOLOGY | Facility: HOSPITAL | Age: 74
Discharge: HOME | End: 2024-03-07
Payer: MEDICARE

## 2024-03-07 ENCOUNTER — INFUSION (OUTPATIENT)
Dept: HEMATOLOGY/ONCOLOGY | Facility: CLINIC | Age: 74
End: 2024-03-07
Payer: MEDICARE

## 2024-03-07 ENCOUNTER — HOSPITAL ENCOUNTER (OUTPATIENT)
Dept: RADIATION ONCOLOGY | Facility: CLINIC | Age: 74
Setting detail: RADIATION/ONCOLOGY SERIES
Discharge: HOME | End: 2024-03-07
Payer: MEDICARE

## 2024-03-07 VITALS
RESPIRATION RATE: 18 BRPM | BODY MASS INDEX: 42.81 KG/M2 | WEIGHT: 293.98 LBS | TEMPERATURE: 97.2 F | HEART RATE: 68 BPM | SYSTOLIC BLOOD PRESSURE: 122 MMHG | DIASTOLIC BLOOD PRESSURE: 78 MMHG | OXYGEN SATURATION: 98 %

## 2024-03-07 DIAGNOSIS — Z95.2 S/P TAVR (TRANSCATHETER AORTIC VALVE REPLACEMENT): ICD-10-CM

## 2024-03-07 DIAGNOSIS — C61 MALIGNANT NEOPLASM OF PROSTATE (MULTI): ICD-10-CM

## 2024-03-07 DIAGNOSIS — Z51.0 ENCOUNTER FOR ANTINEOPLASTIC RADIATION THERAPY: ICD-10-CM

## 2024-03-07 LAB
AORTIC VALVE MEAN GRADIENT: 9 MMHG
AORTIC VALVE PEAK VELOCITY: 1.87 M/S
AV PEAK GRADIENT: 14 MMHG
LEFT ATRIUM VOLUME AREA LENGTH INDEX BSA: 28.8 ML/M2
MITRAL VALVE E/A RATIO: 0.86
MITRAL VALVE E/E' RATIO: 19.27
RAD ONC MSQ ACTUAL FRACTIONS DELIVERED: 4
RAD ONC MSQ ACTUAL SESSION DELIVERED DOSE: 300 CGRAY
RAD ONC MSQ ACTUAL TOTAL DOSE: 1200 CGRAY
RAD ONC MSQ ELAPSED DAYS: 3
RAD ONC MSQ LAST DATE: NORMAL
RAD ONC MSQ PRESCRIBED FRACTIONAL DOSE: 300 CGRAY
RAD ONC MSQ PRESCRIBED NUMBER OF FRACTIONS: 20
RAD ONC MSQ PRESCRIBED TECHNIQUE: NORMAL
RAD ONC MSQ PRESCRIBED TOTAL DOSE: 6000 CGRAY
RAD ONC MSQ PRESCRIPTION PATTERN COMMENT: NORMAL
RAD ONC MSQ START DATE: NORMAL
RAD ONC MSQ TREATMENT COURSE NUMBER: 1
RAD ONC MSQ TREATMENT SITE: NORMAL
RIGHT VENTRICLE FREE WALL PEAK S': 10.9 CM/S
RIGHT VENTRICLE PEAK SYSTOLIC PRESSURE: 33.7 MMHG
TRICUSPID ANNULAR PLANE SYSTOLIC EXCURSION: 1.7 CM

## 2024-03-07 PROCEDURE — 96402 CHEMO HORMON ANTINEOPL SQ/IM: CPT

## 2024-03-07 PROCEDURE — 93306 TTE W/DOPPLER COMPLETE: CPT | Performed by: INTERNAL MEDICINE

## 2024-03-07 PROCEDURE — 2500000004 HC RX 250 GENERAL PHARMACY W/ HCPCS (ALT 636 FOR OP/ED): Mod: JZ,JG | Performed by: INTERNAL MEDICINE

## 2024-03-07 PROCEDURE — 77014 CHG CT GUIDANCE RADIATION THERAPY FLDS PLACEMENT: CPT | Performed by: RADIOLOGY

## 2024-03-07 PROCEDURE — 77385 HC INTENSITY-MODULATED RADIATION THERAPY (IMRT), SIMPLE: CPT | Performed by: RADIOLOGY

## 2024-03-07 PROCEDURE — 93306 TTE W/DOPPLER COMPLETE: CPT

## 2024-03-07 PROCEDURE — 96372 THER/PROPH/DIAG INJ SC/IM: CPT | Performed by: INTERNAL MEDICINE

## 2024-03-07 RX ORDER — DIPHENHYDRAMINE HYDROCHLORIDE 50 MG/ML
50 INJECTION INTRAMUSCULAR; INTRAVENOUS AS NEEDED
OUTPATIENT
Start: 2024-08-22

## 2024-03-07 RX ORDER — EPINEPHRINE 0.3 MG/.3ML
0.3 INJECTION SUBCUTANEOUS EVERY 5 MIN PRN
Status: DISCONTINUED | OUTPATIENT
Start: 2024-03-07 | End: 2024-03-07 | Stop reason: HOSPADM

## 2024-03-07 RX ORDER — FAMOTIDINE 10 MG/ML
20 INJECTION INTRAVENOUS ONCE AS NEEDED
Status: DISCONTINUED | OUTPATIENT
Start: 2024-03-07 | End: 2024-03-07 | Stop reason: HOSPADM

## 2024-03-07 RX ORDER — FAMOTIDINE 10 MG/ML
20 INJECTION INTRAVENOUS ONCE AS NEEDED
OUTPATIENT
Start: 2024-08-22

## 2024-03-07 RX ORDER — DIPHENHYDRAMINE HYDROCHLORIDE 50 MG/ML
50 INJECTION INTRAMUSCULAR; INTRAVENOUS AS NEEDED
Status: DISCONTINUED | OUTPATIENT
Start: 2024-03-07 | End: 2024-03-07 | Stop reason: HOSPADM

## 2024-03-07 RX ORDER — ALBUTEROL SULFATE 0.83 MG/ML
3 SOLUTION RESPIRATORY (INHALATION) AS NEEDED
OUTPATIENT
Start: 2024-08-22

## 2024-03-07 RX ORDER — EPINEPHRINE 0.3 MG/.3ML
0.3 INJECTION SUBCUTANEOUS EVERY 5 MIN PRN
OUTPATIENT
Start: 2024-08-22

## 2024-03-07 RX ORDER — ALBUTEROL SULFATE 0.83 MG/ML
3 SOLUTION RESPIRATORY (INHALATION) AS NEEDED
Status: DISCONTINUED | OUTPATIENT
Start: 2024-03-07 | End: 2024-03-07 | Stop reason: HOSPADM

## 2024-03-07 RX ADMIN — LEUPROLIDE ACETATE 45 MG: 45 INJECTION, SUSPENSION, EXTENDED RELEASE SUBCUTANEOUS at 08:07

## 2024-03-07 ASSESSMENT — PAIN SCALES - GENERAL: PAINLEVEL: 0-NO PAIN

## 2024-03-08 ENCOUNTER — HOSPITAL ENCOUNTER (OUTPATIENT)
Dept: RADIATION ONCOLOGY | Facility: CLINIC | Age: 74
Setting detail: RADIATION/ONCOLOGY SERIES
Discharge: HOME | End: 2024-03-08
Payer: MEDICARE

## 2024-03-08 ENCOUNTER — RADIATION ONCOLOGY OTV (OUTPATIENT)
Dept: RADIATION ONCOLOGY | Facility: CLINIC | Age: 74
End: 2024-03-08
Payer: MEDICARE

## 2024-03-08 VITALS
WEIGHT: 292.11 LBS | BODY MASS INDEX: 42.53 KG/M2 | DIASTOLIC BLOOD PRESSURE: 65 MMHG | OXYGEN SATURATION: 97 % | SYSTOLIC BLOOD PRESSURE: 116 MMHG | HEART RATE: 64 BPM | RESPIRATION RATE: 16 BRPM | TEMPERATURE: 96.8 F

## 2024-03-08 DIAGNOSIS — Z51.0 ENCOUNTER FOR ANTINEOPLASTIC RADIATION THERAPY: ICD-10-CM

## 2024-03-08 DIAGNOSIS — C61 MALIGNANT NEOPLASM OF PROSTATE (MULTI): ICD-10-CM

## 2024-03-08 LAB
RAD ONC MSQ ACTUAL FRACTIONS DELIVERED: 5
RAD ONC MSQ ACTUAL SESSION DELIVERED DOSE: 300 CGRAY
RAD ONC MSQ ACTUAL TOTAL DOSE: 1500 CGRAY
RAD ONC MSQ ELAPSED DAYS: 4
RAD ONC MSQ LAST DATE: NORMAL
RAD ONC MSQ PRESCRIBED FRACTIONAL DOSE: 300 CGRAY
RAD ONC MSQ PRESCRIBED NUMBER OF FRACTIONS: 20
RAD ONC MSQ PRESCRIBED TECHNIQUE: NORMAL
RAD ONC MSQ PRESCRIBED TOTAL DOSE: 6000 CGRAY
RAD ONC MSQ PRESCRIPTION PATTERN COMMENT: NORMAL
RAD ONC MSQ START DATE: NORMAL
RAD ONC MSQ TREATMENT COURSE NUMBER: 1
RAD ONC MSQ TREATMENT SITE: NORMAL

## 2024-03-08 PROCEDURE — 77385 HC INTENSITY-MODULATED RADIATION THERAPY (IMRT), SIMPLE: CPT | Performed by: RADIOLOGY

## 2024-03-08 PROCEDURE — 77427 RADIATION TX MANAGEMENT X5: CPT | Performed by: RADIOLOGY

## 2024-03-08 PROCEDURE — 77014 CHG CT GUIDANCE RADIATION THERAPY FLDS PLACEMENT: CPT | Performed by: RADIOLOGY

## 2024-03-08 ASSESSMENT — PAIN SCALES - GENERAL: PAINLEVEL: 0-NO PAIN

## 2024-03-08 NOTE — PROGRESS NOTES
Radiation Oncology On Treatment Visit    Patient Name:  Jerald Sandoval  MRN:  [id number]  :  1950    Referring Provider: No ref. provider found  Primary Care Provider: Jimmie Rogers MD  Care Team: Patient Care Team:  Jimmie Rogers MD as PCP - General (Internal Medicine)  Jimmie Rogers MD as PCP - [employer] PCP  Stan Mahmood MD as Consulting Physician (Hematology and Oncology)    Date of Service: 3/8/2024     Diagnosis:   Specialty Problems          Radiation Oncology Problems    Malignant neoplasm of prostate (CMS/HCC)        Prostate cancer (CMS/HCC)         Treatment Summary:  Radiation Treatments       Active   Prostate/SV (Started on 3/4/2024)   Most recent fraction: 300 cGy given on 3/8/2024   Total given: 1,500 cGy / 6,000 cGy  (5 of 20 fractions)   Elapsed Days: 4   Technique: VMAT           Completed   No historical radiation treatments to show.             SUBJECTIVE: The patient feels well and has no issues.         OBJECTIVE:   Vital Signs:  /65 (BP Location: Left arm, Patient Position: Sitting)   Pulse 64   Temp 36 °C (96.8 °F)   Resp 16   Wt 132 kg (292 lb 1.8 oz)   SpO2 97%   BMI 42.53 kg/m²    Pain Scale: The patient's current pain level was assessed.  They report currently having a pain of 0 out of 10.    Other Pertinent Findings:     Toxicity Assessment          3/8/2024    10:29   Toxicity Assessment   Treatment Site Pelvis - male   Anorexia Grade 0   Anxiety Grade 0   Dehydration Grade 0   Depression Grade 0   Dermatitis Radiation Grade 0   Diarrhea Grade 0   Fatigue Grade 0   Fibrosis Deep Connective Tissue Grade 0   Fracture Grade 0   Nausea Grade 0   Pain Grade 0   Treatment Related Secondary Malignancy Grade 0   Tumor Pain Grade 0   Vomiting Grade 0   Abdominal Pain Grade 0   Bloating Grade 0   Constipation Grade 0   Dysphagia Grade 0   Enterovesical Fistula Grade 0   Fecal Incontinence Grade 0   Lower Gastrointestinal Hemorrhage Grade 0    Mucositis Oral Grade 0   Proctitis Grade 0   Rectal Hemorrhage Grade 0   Rectal Pain Grade 0   Rectal Ulcer Grade 0   Small Intestinal Obstruction Grade 0   Cystitis Noninfective Grade 0   Hematuria Grade 0   Urinary Incontinence Grade 1   Erectile Dysfunction Grade 0   Dry Mouth Grade 0   Edema Limbs Grade 0   Gastric Fistula Grade 0   Rectal Mucositis Grade 0   Rectal Stenosis Grade 0   Bladder Spasm Grade 0   Urinary Frequency Grade 0   Urinary Retention Grade 0   Urinary Tract Obstruction Grade 0   Urinary Tract Pain Grade 0   Urinary Urgency Grade 0   Ejaculation Disorder Grade 0        Assessment / Plan:  The patient is tolerating radiation therapy as anticipated.  Continue per current treatment plan.

## 2024-03-11 ENCOUNTER — RADIATION ONCOLOGY OTV (OUTPATIENT)
Dept: RADIATION ONCOLOGY | Facility: CLINIC | Age: 74
End: 2024-03-11

## 2024-03-11 ENCOUNTER — HOSPITAL ENCOUNTER (OUTPATIENT)
Dept: RADIATION ONCOLOGY | Facility: CLINIC | Age: 74
Setting detail: RADIATION/ONCOLOGY SERIES
Discharge: HOME | End: 2024-03-11
Payer: MEDICARE

## 2024-03-11 VITALS
OXYGEN SATURATION: 96 % | RESPIRATION RATE: 16 BRPM | HEART RATE: 88 BPM | WEIGHT: 293.87 LBS | DIASTOLIC BLOOD PRESSURE: 74 MMHG | SYSTOLIC BLOOD PRESSURE: 128 MMHG | TEMPERATURE: 96.6 F | BODY MASS INDEX: 42.79 KG/M2

## 2024-03-11 DIAGNOSIS — Z51.0 ENCOUNTER FOR ANTINEOPLASTIC RADIATION THERAPY: ICD-10-CM

## 2024-03-11 DIAGNOSIS — C61 MALIGNANT NEOPLASM OF PROSTATE (MULTI): ICD-10-CM

## 2024-03-11 LAB
RAD ONC MSQ ACTUAL FRACTIONS DELIVERED: 6
RAD ONC MSQ ACTUAL SESSION DELIVERED DOSE: 300 CGRAY
RAD ONC MSQ ACTUAL TOTAL DOSE: 1800 CGRAY
RAD ONC MSQ ELAPSED DAYS: 7
RAD ONC MSQ LAST DATE: NORMAL
RAD ONC MSQ PRESCRIBED FRACTIONAL DOSE: 300 CGRAY
RAD ONC MSQ PRESCRIBED NUMBER OF FRACTIONS: 20
RAD ONC MSQ PRESCRIBED TECHNIQUE: NORMAL
RAD ONC MSQ PRESCRIBED TOTAL DOSE: 6000 CGRAY
RAD ONC MSQ PRESCRIPTION PATTERN COMMENT: NORMAL
RAD ONC MSQ START DATE: NORMAL
RAD ONC MSQ TREATMENT COURSE NUMBER: 1
RAD ONC MSQ TREATMENT SITE: NORMAL

## 2024-03-11 PROCEDURE — 77385 HC INTENSITY-MODULATED RADIATION THERAPY (IMRT), SIMPLE: CPT | Performed by: RADIOLOGY

## 2024-03-11 PROCEDURE — 77427 RADIATION TX MANAGEMENT X5: CPT | Performed by: RADIOLOGY

## 2024-03-11 PROCEDURE — 77014 CHG CT GUIDANCE RADIATION THERAPY FLDS PLACEMENT: CPT | Performed by: RADIOLOGY

## 2024-03-11 SDOH — ECONOMIC STABILITY: HOUSING INSECURITY
IN THE LAST 12 MONTHS, WAS THERE A TIME WHEN YOU DID NOT HAVE A STEADY PLACE TO SLEEP OR SLEPT IN A SHELTER (INCLUDING NOW)?: NO

## 2024-03-11 SDOH — ECONOMIC STABILITY: TRANSPORTATION INSECURITY
IN THE PAST 12 MONTHS, HAS THE LACK OF TRANSPORTATION KEPT YOU FROM MEDICAL APPOINTMENTS OR FROM GETTING MEDICATIONS?: NO

## 2024-03-11 SDOH — ECONOMIC STABILITY: FOOD INSECURITY: WITHIN THE PAST 12 MONTHS, YOU WORRIED THAT YOUR FOOD WOULD RUN OUT BEFORE YOU GOT MONEY TO BUY MORE.: NEVER TRUE

## 2024-03-11 SDOH — ECONOMIC STABILITY: TRANSPORTATION INSECURITY
IN THE PAST 12 MONTHS, HAS LACK OF TRANSPORTATION KEPT YOU FROM MEETINGS, WORK, OR FROM GETTING THINGS NEEDED FOR DAILY LIVING?: NO

## 2024-03-11 SDOH — ECONOMIC STABILITY: INCOME INSECURITY: IN THE LAST 12 MONTHS, WAS THERE A TIME WHEN YOU WERE NOT ABLE TO PAY THE MORTGAGE OR RENT ON TIME?: NO

## 2024-03-11 ASSESSMENT — ENCOUNTER SYMPTOMS
DEPRESSION: 0
OCCASIONAL FEELINGS OF UNSTEADINESS: 0
LOSS OF SENSATION IN FEET: 0

## 2024-03-11 ASSESSMENT — COLUMBIA-SUICIDE SEVERITY RATING SCALE - C-SSRS
1. IN THE PAST MONTH, HAVE YOU WISHED YOU WERE DEAD OR WISHED YOU COULD GO TO SLEEP AND NOT WAKE UP?: NO
2. HAVE YOU ACTUALLY HAD ANY THOUGHTS OF KILLING YOURSELF?: NO
6. HAVE YOU EVER DONE ANYTHING, STARTED TO DO ANYTHING, OR PREPARED TO DO ANYTHING TO END YOUR LIFE?: NO

## 2024-03-11 ASSESSMENT — LIFESTYLE VARIABLES
AUDIT-C TOTAL SCORE: 1
SKIP TO QUESTIONS 9-10: 1
HOW OFTEN DO YOU HAVE SIX OR MORE DRINKS ON ONE OCCASION: NEVER
HOW MANY STANDARD DRINKS CONTAINING ALCOHOL DO YOU HAVE ON A TYPICAL DAY: 1 OR 2
HOW OFTEN DO YOU HAVE A DRINK CONTAINING ALCOHOL: MONTHLY OR LESS

## 2024-03-11 ASSESSMENT — PATIENT HEALTH QUESTIONNAIRE - PHQ9
1. LITTLE INTEREST OR PLEASURE IN DOING THINGS: NOT AT ALL
2. FEELING DOWN, DEPRESSED OR HOPELESS: NOT AT ALL
SUM OF ALL RESPONSES TO PHQ9 QUESTIONS 1 AND 2: 0

## 2024-03-11 ASSESSMENT — PAIN SCALES - GENERAL: PAINLEVEL: 0-NO PAIN

## 2024-03-11 ASSESSMENT — SOCIAL DETERMINANTS OF HEALTH (SDOH): HOW HARD IS IT FOR YOU TO PAY FOR THE VERY BASICS LIKE FOOD, HOUSING, MEDICAL CARE, AND HEATING?: NOT HARD AT ALL

## 2024-03-11 NOTE — PROGRESS NOTES
Radiation Oncology On Treatment Visit    Patient Name:  Jerald Sandoval  MRN:  [id number]  :  1950    Referring Provider: No ref. provider found  Primary Care Provider: Jimmie Rogers MD  Care Team: Patient Care Team:  Jimmie Rogers MD as PCP - General (Internal Medicine)  Jimmie Rogers MD as PCP - [employer]vantage PCP  Stan Mahmood MD as Consulting Physician (Hematology and Oncology)    Date of Service: 3/11/2024     Diagnosis:   Specialty Problems          Radiation Oncology Problems    Malignant neoplasm of prostate (CMS/HCC)        Prostate cancer (CMS/HCC)         Treatment Summary:  Radiation Treatments       Active   Prostate/SV (Started on 3/4/2024)   Most recent fraction: 300 cGy given on 3/11/2024   Total given: 1,800 cGy / 6,000 cGy  (6 of 20 fractions)   Elapsed Days: 7   Technique: VMAT           Completed   No historical radiation treatments to show.             SUBJECTIVE: The patient feels well and has no issues.       OBJECTIVE:   Vital Signs:  /74 (BP Location: Left arm, Patient Position: Sitting, BP Cuff Size: Large adult)   Pulse 88   Temp 35.9 °C (96.6 °F) (Temporal)   Resp 16   Wt 133 kg (293 lb 14 oz)   SpO2 96%   BMI 42.79 kg/m²    Pain Scale: The patient's current pain level was assessed.  They report currently having a pain of 0 out of 10.    Other Pertinent Findings:     Toxicity Assessment          3/8/2024    10:29 3/11/2024    10:07   Toxicity Assessment   Treatment Site Pelvis - male Pelvis - male   Anorexia Grade 0 Grade 0   Anxiety Grade 0 Grade 0   Dehydration Grade 0 Grade 0   Depression Grade 0 Grade 0   Dermatitis Radiation Grade 0 Grade 0   Diarrhea Grade 0 Grade 0   Fatigue Grade 0 Grade 1   Fibrosis Deep Connective Tissue Grade 0 Grade 0   Fracture Grade 0 Grade 0   Nausea Grade 0 Grade 0   Pain Grade 0 Grade 0   Treatment Related Secondary Malignancy Grade 0 Grade 0   Tumor Pain Grade 0 Grade 0   Vomiting Grade 0 Grade 0   Abdominal  Pain Grade 0 Grade 0   Bloating Grade 0 Grade 0   Constipation Grade 0 Grade 0   Dysphagia Grade 0 Grade 0   Enterovesical Fistula Grade 0 Grade 0   Fecal Incontinence Grade 0 Grade 0   Lower Gastrointestinal Hemorrhage Grade 0 Grade 0   Mucositis Oral Grade 0 Grade 0   Proctitis Grade 0 Grade 0   Rectal Hemorrhage Grade 0 Grade 0   Rectal Pain Grade 0 Grade 0   Rectal Ulcer Grade 0 Grade 0   Small Intestinal Obstruction Grade 0 Grade 0   Cystitis Noninfective Grade 0 Grade 0   Hematuria Grade 0 Grade 0   Urinary Incontinence Grade 1 Grade 1   Erectile Dysfunction Grade 0    Dry Mouth Grade 0    Edema Limbs Grade 0 Grade 0   Gastric Fistula Grade 0 Grade 0   Rectal Mucositis Grade 0 Grade 0   Rectal Stenosis Grade 0 Grade 0   Bladder Spasm Grade 0 Grade 0   Urinary Frequency Grade 0 Grade 1   Urinary Retention Grade 0 Grade 0   Urinary Tract Obstruction Grade 0 Grade 0   Urinary Tract Pain Grade 0 Grade 0   Urinary Urgency Grade 0 Grade 1   Ejaculation Disorder Grade 0 Grade 0        Assessment / Plan:  The patient is tolerating radiation therapy as anticipated.  Continue per current treatment plan.

## 2024-03-12 ENCOUNTER — HOSPITAL ENCOUNTER (OUTPATIENT)
Dept: RADIATION ONCOLOGY | Facility: CLINIC | Age: 74
Setting detail: RADIATION/ONCOLOGY SERIES
Discharge: HOME | End: 2024-03-12
Payer: MEDICARE

## 2024-03-12 DIAGNOSIS — C61 MALIGNANT NEOPLASM OF PROSTATE (MULTI): ICD-10-CM

## 2024-03-12 DIAGNOSIS — Z51.0 ENCOUNTER FOR ANTINEOPLASTIC RADIATION THERAPY: ICD-10-CM

## 2024-03-12 LAB
RAD ONC MSQ ACTUAL FRACTIONS DELIVERED: 7
RAD ONC MSQ ACTUAL SESSION DELIVERED DOSE: 300 CGRAY
RAD ONC MSQ ACTUAL TOTAL DOSE: 2100 CGRAY
RAD ONC MSQ ELAPSED DAYS: 8
RAD ONC MSQ LAST DATE: NORMAL
RAD ONC MSQ PRESCRIBED FRACTIONAL DOSE: 300 CGRAY
RAD ONC MSQ PRESCRIBED NUMBER OF FRACTIONS: 20
RAD ONC MSQ PRESCRIBED TECHNIQUE: NORMAL
RAD ONC MSQ PRESCRIBED TOTAL DOSE: 6000 CGRAY
RAD ONC MSQ PRESCRIPTION PATTERN COMMENT: NORMAL
RAD ONC MSQ START DATE: NORMAL
RAD ONC MSQ TREATMENT COURSE NUMBER: 1
RAD ONC MSQ TREATMENT SITE: NORMAL

## 2024-03-12 PROCEDURE — 77385 HC INTENSITY-MODULATED RADIATION THERAPY (IMRT), SIMPLE: CPT | Performed by: RADIOLOGY

## 2024-03-12 PROCEDURE — 77336 RADIATION PHYSICS CONSULT: CPT | Performed by: RADIOLOGY

## 2024-03-12 PROCEDURE — 77014 CHG CT GUIDANCE RADIATION THERAPY FLDS PLACEMENT: CPT | Performed by: RADIOLOGY

## 2024-03-13 ENCOUNTER — HOSPITAL ENCOUNTER (OUTPATIENT)
Dept: RADIATION ONCOLOGY | Facility: CLINIC | Age: 74
Setting detail: RADIATION/ONCOLOGY SERIES
Discharge: HOME | End: 2024-03-13
Payer: MEDICARE

## 2024-03-13 DIAGNOSIS — C61 MALIGNANT NEOPLASM OF PROSTATE (MULTI): ICD-10-CM

## 2024-03-13 DIAGNOSIS — Z51.0 ENCOUNTER FOR ANTINEOPLASTIC RADIATION THERAPY: ICD-10-CM

## 2024-03-13 LAB
RAD ONC MSQ ACTUAL FRACTIONS DELIVERED: 8
RAD ONC MSQ ACTUAL SESSION DELIVERED DOSE: 300 CGRAY
RAD ONC MSQ ACTUAL TOTAL DOSE: 2400 CGRAY
RAD ONC MSQ ELAPSED DAYS: 9
RAD ONC MSQ LAST DATE: NORMAL
RAD ONC MSQ PRESCRIBED FRACTIONAL DOSE: 300 CGRAY
RAD ONC MSQ PRESCRIBED NUMBER OF FRACTIONS: 20
RAD ONC MSQ PRESCRIBED TECHNIQUE: NORMAL
RAD ONC MSQ PRESCRIBED TOTAL DOSE: 6000 CGRAY
RAD ONC MSQ PRESCRIPTION PATTERN COMMENT: NORMAL
RAD ONC MSQ START DATE: NORMAL
RAD ONC MSQ TREATMENT COURSE NUMBER: 1
RAD ONC MSQ TREATMENT SITE: NORMAL

## 2024-03-13 PROCEDURE — 77014 CHG CT GUIDANCE RADIATION THERAPY FLDS PLACEMENT: CPT | Performed by: STUDENT IN AN ORGANIZED HEALTH CARE EDUCATION/TRAINING PROGRAM

## 2024-03-13 PROCEDURE — 77385 HC INTENSITY-MODULATED RADIATION THERAPY (IMRT), SIMPLE: CPT | Performed by: RADIOLOGY

## 2024-03-14 ENCOUNTER — HOSPITAL ENCOUNTER (OUTPATIENT)
Dept: RADIATION ONCOLOGY | Facility: CLINIC | Age: 74
Setting detail: RADIATION/ONCOLOGY SERIES
Discharge: HOME | End: 2024-03-14
Payer: MEDICARE

## 2024-03-14 ENCOUNTER — LAB (OUTPATIENT)
Dept: LAB | Facility: LAB | Age: 74
End: 2024-03-14
Payer: MEDICARE

## 2024-03-14 DIAGNOSIS — C61 MALIGNANT NEOPLASM OF PROSTATE (MULTI): ICD-10-CM

## 2024-03-14 DIAGNOSIS — Z51.0 ENCOUNTER FOR ANTINEOPLASTIC RADIATION THERAPY: ICD-10-CM

## 2024-03-14 DIAGNOSIS — E03.9 HYPOTHYROIDISM, UNSPECIFIED: ICD-10-CM

## 2024-03-14 DIAGNOSIS — Z01.89 ENCOUNTER FOR OTHER SPECIFIED SPECIAL EXAMINATIONS: Primary | ICD-10-CM

## 2024-03-14 DIAGNOSIS — E11.65 TYPE 2 DIABETES MELLITUS WITH HYPERGLYCEMIA (MULTI): ICD-10-CM

## 2024-03-14 LAB
ALBUMIN SERPL BCP-MCNC: 4 G/DL (ref 3.4–5)
ALP SERPL-CCNC: 80 U/L (ref 33–136)
ALT SERPL W P-5'-P-CCNC: 26 U/L (ref 10–52)
ANION GAP SERPL CALC-SCNC: 12 MMOL/L (ref 10–20)
AST SERPL W P-5'-P-CCNC: 27 U/L (ref 9–39)
BASOPHILS # BLD AUTO: 0.04 X10*3/UL (ref 0–0.1)
BASOPHILS NFR BLD AUTO: 1.1 %
BILIRUB DIRECT SERPL-MCNC: 0.1 MG/DL (ref 0–0.3)
BILIRUB SERPL-MCNC: 0.6 MG/DL (ref 0–1.2)
BUN SERPL-MCNC: 12 MG/DL (ref 6–23)
CALCIUM SERPL-MCNC: 9.4 MG/DL (ref 8.6–10.3)
CHLORIDE SERPL-SCNC: 102 MMOL/L (ref 98–107)
CHOLEST SERPL-MCNC: 131 MG/DL (ref 0–199)
CHOLESTEROL/HDL RATIO: 3.7
CO2 SERPL-SCNC: 29 MMOL/L (ref 21–32)
CREAT SERPL-MCNC: 1.18 MG/DL (ref 0.5–1.3)
EGFRCR SERPLBLD CKD-EPI 2021: 65 ML/MIN/1.73M*2
EOSINOPHIL # BLD AUTO: 0.11 X10*3/UL (ref 0–0.4)
EOSINOPHIL NFR BLD AUTO: 3 %
ERYTHROCYTE [DISTWIDTH] IN BLOOD BY AUTOMATED COUNT: 15.2 % (ref 11.5–14.5)
GLUCOSE SERPL-MCNC: 116 MG/DL (ref 74–99)
HCT VFR BLD AUTO: 41.9 % (ref 41–52)
HDLC SERPL-MCNC: 35.7 MG/DL
HGB BLD-MCNC: 13.4 G/DL (ref 13.5–17.5)
IMM GRANULOCYTES # BLD AUTO: 0.01 X10*3/UL (ref 0–0.5)
IMM GRANULOCYTES NFR BLD AUTO: 0.3 % (ref 0–0.9)
LDLC SERPL CALC-MCNC: 59 MG/DL
LYMPHOCYTES # BLD AUTO: 0.79 X10*3/UL (ref 0.8–3)
LYMPHOCYTES NFR BLD AUTO: 21.8 %
MCH RBC QN AUTO: 27.6 PG (ref 26–34)
MCHC RBC AUTO-ENTMCNC: 32 G/DL (ref 32–36)
MCV RBC AUTO: 86 FL (ref 80–100)
MONOCYTES # BLD AUTO: 0.61 X10*3/UL (ref 0.05–0.8)
MONOCYTES NFR BLD AUTO: 16.9 %
NEUTROPHILS # BLD AUTO: 2.06 X10*3/UL (ref 1.6–5.5)
NEUTROPHILS NFR BLD AUTO: 56.9 %
NON HDL CHOLESTEROL: 95 MG/DL (ref 0–149)
NRBC BLD-RTO: 0 /100 WBCS (ref 0–0)
PLATELET # BLD AUTO: 189 X10*3/UL (ref 150–450)
POTASSIUM SERPL-SCNC: 3.6 MMOL/L (ref 3.5–5.3)
PROT SERPL-MCNC: 6.3 G/DL (ref 6.4–8.2)
RAD ONC MSQ ACTUAL FRACTIONS DELIVERED: 9
RAD ONC MSQ ACTUAL SESSION DELIVERED DOSE: 300 CGRAY
RAD ONC MSQ ACTUAL TOTAL DOSE: 2700 CGRAY
RAD ONC MSQ ELAPSED DAYS: 10
RAD ONC MSQ LAST DATE: NORMAL
RAD ONC MSQ PRESCRIBED FRACTIONAL DOSE: 300 CGRAY
RAD ONC MSQ PRESCRIBED NUMBER OF FRACTIONS: 20
RAD ONC MSQ PRESCRIBED TECHNIQUE: NORMAL
RAD ONC MSQ PRESCRIBED TOTAL DOSE: 6000 CGRAY
RAD ONC MSQ PRESCRIPTION PATTERN COMMENT: NORMAL
RAD ONC MSQ START DATE: NORMAL
RAD ONC MSQ TREATMENT COURSE NUMBER: 1
RAD ONC MSQ TREATMENT SITE: NORMAL
RBC # BLD AUTO: 4.85 X10*6/UL (ref 4.5–5.9)
SODIUM SERPL-SCNC: 139 MMOL/L (ref 136–145)
T4 FREE SERPL-MCNC: 1.01 NG/DL (ref 0.61–1.12)
TRIGL SERPL-MCNC: 183 MG/DL (ref 0–149)
TSH SERPL-ACNC: 0.42 MIU/L (ref 0.44–3.98)
VLDL: 37 MG/DL (ref 0–40)
WBC # BLD AUTO: 3.6 X10*3/UL (ref 4.4–11.3)

## 2024-03-14 PROCEDURE — 77385 HC INTENSITY-MODULATED RADIATION THERAPY (IMRT), SIMPLE: CPT | Performed by: RADIOLOGY

## 2024-03-14 PROCEDURE — 84443 ASSAY THYROID STIM HORMONE: CPT | Performed by: STUDENT IN AN ORGANIZED HEALTH CARE EDUCATION/TRAINING PROGRAM

## 2024-03-14 PROCEDURE — 80061 LIPID PANEL: CPT | Performed by: STUDENT IN AN ORGANIZED HEALTH CARE EDUCATION/TRAINING PROGRAM

## 2024-03-14 PROCEDURE — 83036 HEMOGLOBIN GLYCOSYLATED A1C: CPT | Performed by: STUDENT IN AN ORGANIZED HEALTH CARE EDUCATION/TRAINING PROGRAM

## 2024-03-14 PROCEDURE — 84439 ASSAY OF FREE THYROXINE: CPT | Performed by: STUDENT IN AN ORGANIZED HEALTH CARE EDUCATION/TRAINING PROGRAM

## 2024-03-14 PROCEDURE — 80048 BASIC METABOLIC PNL TOTAL CA: CPT | Performed by: STUDENT IN AN ORGANIZED HEALTH CARE EDUCATION/TRAINING PROGRAM

## 2024-03-14 PROCEDURE — 85025 COMPLETE CBC W/AUTO DIFF WBC: CPT

## 2024-03-14 PROCEDURE — 77014 CHG CT GUIDANCE RADIATION THERAPY FLDS PLACEMENT: CPT | Performed by: STUDENT IN AN ORGANIZED HEALTH CARE EDUCATION/TRAINING PROGRAM

## 2024-03-14 PROCEDURE — 84075 ASSAY ALKALINE PHOSPHATASE: CPT | Performed by: STUDENT IN AN ORGANIZED HEALTH CARE EDUCATION/TRAINING PROGRAM

## 2024-03-15 ENCOUNTER — HOSPITAL ENCOUNTER (OUTPATIENT)
Dept: RADIATION ONCOLOGY | Facility: CLINIC | Age: 74
Setting detail: RADIATION/ONCOLOGY SERIES
Discharge: HOME | End: 2024-03-15
Payer: MEDICARE

## 2024-03-15 DIAGNOSIS — Z51.0 ENCOUNTER FOR ANTINEOPLASTIC RADIATION THERAPY: ICD-10-CM

## 2024-03-15 DIAGNOSIS — C61 MALIGNANT NEOPLASM OF PROSTATE (MULTI): ICD-10-CM

## 2024-03-15 LAB
EST. AVERAGE GLUCOSE BLD GHB EST-MCNC: 128 MG/DL
HBA1C MFR BLD: 6.1 %
RAD ONC MSQ ACTUAL FRACTIONS DELIVERED: 10
RAD ONC MSQ ACTUAL SESSION DELIVERED DOSE: 300 CGRAY
RAD ONC MSQ ACTUAL TOTAL DOSE: 3000 CGRAY
RAD ONC MSQ ELAPSED DAYS: 11
RAD ONC MSQ LAST DATE: NORMAL
RAD ONC MSQ PRESCRIBED FRACTIONAL DOSE: 300 CGRAY
RAD ONC MSQ PRESCRIBED NUMBER OF FRACTIONS: 20
RAD ONC MSQ PRESCRIBED TECHNIQUE: NORMAL
RAD ONC MSQ PRESCRIBED TOTAL DOSE: 6000 CGRAY
RAD ONC MSQ PRESCRIPTION PATTERN COMMENT: NORMAL
RAD ONC MSQ START DATE: NORMAL
RAD ONC MSQ TREATMENT COURSE NUMBER: 1
RAD ONC MSQ TREATMENT SITE: NORMAL

## 2024-03-15 PROCEDURE — 77014 CHG CT GUIDANCE RADIATION THERAPY FLDS PLACEMENT: CPT | Performed by: STUDENT IN AN ORGANIZED HEALTH CARE EDUCATION/TRAINING PROGRAM

## 2024-03-15 PROCEDURE — 77385 HC INTENSITY-MODULATED RADIATION THERAPY (IMRT), SIMPLE: CPT | Performed by: RADIOLOGY

## 2024-03-18 ENCOUNTER — RADIATION ONCOLOGY OTV (OUTPATIENT)
Dept: RADIATION ONCOLOGY | Facility: CLINIC | Age: 74
End: 2024-03-18
Payer: MEDICARE

## 2024-03-18 ENCOUNTER — HOSPITAL ENCOUNTER (OUTPATIENT)
Dept: RADIATION ONCOLOGY | Facility: CLINIC | Age: 74
Setting detail: RADIATION/ONCOLOGY SERIES
Discharge: HOME | End: 2024-03-18
Payer: MEDICARE

## 2024-03-18 VITALS
OXYGEN SATURATION: 96 % | HEART RATE: 69 BPM | RESPIRATION RATE: 18 BRPM | BODY MASS INDEX: 42.89 KG/M2 | SYSTOLIC BLOOD PRESSURE: 129 MMHG | DIASTOLIC BLOOD PRESSURE: 77 MMHG | TEMPERATURE: 95.5 F | WEIGHT: 294.53 LBS

## 2024-03-18 PROCEDURE — 77427 RADIATION TX MANAGEMENT X5: CPT | Performed by: RADIOLOGY

## 2024-03-18 ASSESSMENT — PATIENT HEALTH QUESTIONNAIRE - PHQ9
2. FEELING DOWN, DEPRESSED OR HOPELESS: NOT AT ALL
1. LITTLE INTEREST OR PLEASURE IN DOING THINGS: NOT AT ALL
SUM OF ALL RESPONSES TO PHQ9 QUESTIONS 1 AND 2: 0

## 2024-03-18 ASSESSMENT — ENCOUNTER SYMPTOMS
OCCASIONAL FEELINGS OF UNSTEADINESS: 0
DEPRESSION: 0
LOSS OF SENSATION IN FEET: 0

## 2024-03-18 ASSESSMENT — PAIN SCALES - GENERAL: PAINLEVEL: 0-NO PAIN

## 2024-03-18 ASSESSMENT — COLUMBIA-SUICIDE SEVERITY RATING SCALE - C-SSRS
6. HAVE YOU EVER DONE ANYTHING, STARTED TO DO ANYTHING, OR PREPARED TO DO ANYTHING TO END YOUR LIFE?: NO
1. IN THE PAST MONTH, HAVE YOU WISHED YOU WERE DEAD OR WISHED YOU COULD GO TO SLEEP AND NOT WAKE UP?: NO
2. HAVE YOU ACTUALLY HAD ANY THOUGHTS OF KILLING YOURSELF?: NO

## 2024-03-18 NOTE — PROGRESS NOTES
Radiation Oncology On Treatment Visit    Patient Name:  Jerald Sandoval  MRN:  [id number]  :  1950    Referring Provider: No ref. provider found  Primary Care Provider: Jimmie Rogers MD  Care Team: Patient Care Team:  Jimmie Rogers MD as PCP - General (Internal Medicine)  Jimmie Rogers MD as PCP - [employer]vantage PCP  Stan Mahmood MD as Consulting Physician (Hematology and Oncology)    Date of Service: 3/18/2024     Diagnosis:   Specialty Problems          Radiation Oncology Problems    Malignant neoplasm of prostate (CMS/HCC)        Prostate cancer (CMS/HCC)         Treatment Summary:  Radiation Treatments       Active   Prostate/SV (Started on 3/4/2024)   Most recent fraction: 300 cGy given on 3/15/2024   Total given: 3,000 cGy / 6,000 cGy  (10 of 20 fractions)   Elapsed Days: 11   Technique: VMAT           Completed   No historical radiation treatments to show.             SUBJECTIVE: The patient feels well and has no issues.         OBJECTIVE:   Vital Signs:  /77 (BP Location: Left arm, Patient Position: Sitting, BP Cuff Size: Adult long)   Pulse 69   Temp 35.3 °C (95.5 °F) (Temporal)   Resp 18   Wt 134 kg (294 lb 8.6 oz)   SpO2 96%   BMI 42.89 kg/m²    Pain Scale: The patient's current pain level was assessed.  They report currently having a pain of 0 out of 10.    Other Pertinent Findings:     Toxicity Assessment          3/8/2024    10:29 3/11/2024    10:07 3/18/2024    09:22   Toxicity Assessment   Treatment Site Pelvis - male Pelvis - male Pelvis - male   Anorexia Grade 0 Grade 0 Grade 0   Anxiety Grade 0 Grade 0 Grade 0   Dehydration Grade 0 Grade 0 Grade 0   Depression Grade 0 Grade 0 Grade 0   Dermatitis Radiation Grade 0 Grade 0 Grade 0   Diarrhea Grade 0 Grade 0 Grade 0   Fatigue Grade 0 Grade 1 Grade 0   Fibrosis Deep Connective Tissue Grade 0 Grade 0 Grade 0   Fracture Grade 0 Grade 0 Grade 0   Nausea Grade 0 Grade 0 Grade 0   Pain Grade 0 Grade 0 Grade 0    Treatment Related Secondary Malignancy Grade 0 Grade 0 Grade 0   Tumor Pain Grade 0 Grade 0 Grade 0   Vomiting Grade 0 Grade 0 Grade 0   Abdominal Pain Grade 0 Grade 0 Grade 0   Bloating Grade 0 Grade 0 Grade 0   Constipation Grade 0 Grade 0 Grade 0   Dysphagia Grade 0 Grade 0 Grade 0   Enterovesical Fistula Grade 0 Grade 0 Grade 0   Fecal Incontinence Grade 0 Grade 0 Grade 0   Lower Gastrointestinal Hemorrhage Grade 0 Grade 0 Grade 0   Mucositis Oral Grade 0 Grade 0 Grade 0   Proctitis Grade 0 Grade 0 Grade 0   Rectal Hemorrhage Grade 0 Grade 0 Grade 0   Rectal Pain Grade 0 Grade 0 Grade 0   Rectal Ulcer Grade 0 Grade 0 Grade 0   Small Intestinal Obstruction Grade 0 Grade 0 Grade 0   Cystitis Noninfective Grade 0 Grade 0 Grade 0   Hematuria Grade 0 Grade 0 Grade 0   Urinary Incontinence Grade 1 Grade 1 Grade 1   Erectile Dysfunction Grade 0     Dry Mouth Grade 0     Edema Limbs Grade 0 Grade 0 Grade 0   Gastric Fistula Grade 0 Grade 0 Grade 0   Rectal Mucositis Grade 0 Grade 0 Grade 0   Rectal Stenosis Grade 0 Grade 0 Grade 0   Bladder Spasm Grade 0 Grade 0 Grade 0   Urinary Frequency Grade 0 Grade 1 Grade 1   Urinary Retention Grade 0 Grade 0 Grade 0   Urinary Tract Obstruction Grade 0 Grade 0 Grade 0   Urinary Tract Pain Grade 0 Grade 0 Grade 0   Urinary Urgency Grade 0 Grade 1 Grade 1   Ejaculation Disorder Grade 0 Grade 0 Grade 0        Assessment / Plan:  The patient is tolerating radiation therapy as anticipated.  Continue per current treatment plan.

## 2024-03-19 ENCOUNTER — HOSPITAL ENCOUNTER (OUTPATIENT)
Dept: RADIATION ONCOLOGY | Facility: CLINIC | Age: 74
Setting detail: RADIATION/ONCOLOGY SERIES
Discharge: HOME | End: 2024-03-19
Payer: MEDICARE

## 2024-03-19 DIAGNOSIS — Z51.0 ENCOUNTER FOR ANTINEOPLASTIC RADIATION THERAPY: ICD-10-CM

## 2024-03-19 DIAGNOSIS — C61 MALIGNANT NEOPLASM OF PROSTATE (MULTI): ICD-10-CM

## 2024-03-19 LAB
RAD ONC MSQ ACTUAL FRACTIONS DELIVERED: 12
RAD ONC MSQ ACTUAL SESSION DELIVERED DOSE: 300 CGRAY
RAD ONC MSQ ACTUAL TOTAL DOSE: 3600 CGRAY
RAD ONC MSQ ELAPSED DAYS: 15
RAD ONC MSQ LAST DATE: NORMAL
RAD ONC MSQ PRESCRIBED FRACTIONAL DOSE: 300 CGRAY
RAD ONC MSQ PRESCRIBED NUMBER OF FRACTIONS: 20
RAD ONC MSQ PRESCRIBED TECHNIQUE: NORMAL
RAD ONC MSQ PRESCRIBED TOTAL DOSE: 6000 CGRAY
RAD ONC MSQ PRESCRIPTION PATTERN COMMENT: NORMAL
RAD ONC MSQ START DATE: NORMAL
RAD ONC MSQ TREATMENT COURSE NUMBER: 1
RAD ONC MSQ TREATMENT SITE: NORMAL

## 2024-03-19 PROCEDURE — 77385 HC INTENSITY-MODULATED RADIATION THERAPY (IMRT), SIMPLE: CPT | Performed by: RADIOLOGY

## 2024-03-19 PROCEDURE — 77336 RADIATION PHYSICS CONSULT: CPT | Performed by: RADIOLOGY

## 2024-03-19 PROCEDURE — 77014 CHG CT GUIDANCE RADIATION THERAPY FLDS PLACEMENT: CPT | Performed by: RADIOLOGY

## 2024-03-20 ENCOUNTER — HOSPITAL ENCOUNTER (OUTPATIENT)
Dept: RADIATION ONCOLOGY | Facility: CLINIC | Age: 74
Setting detail: RADIATION/ONCOLOGY SERIES
Discharge: HOME | End: 2024-03-20
Payer: MEDICARE

## 2024-03-20 DIAGNOSIS — Z51.0 ENCOUNTER FOR ANTINEOPLASTIC RADIATION THERAPY: ICD-10-CM

## 2024-03-20 DIAGNOSIS — C61 MALIGNANT NEOPLASM OF PROSTATE (MULTI): ICD-10-CM

## 2024-03-20 LAB
RAD ONC MSQ ACTUAL FRACTIONS DELIVERED: 13
RAD ONC MSQ ACTUAL SESSION DELIVERED DOSE: 300 CGRAY
RAD ONC MSQ ACTUAL TOTAL DOSE: 3900 CGRAY
RAD ONC MSQ ELAPSED DAYS: 16
RAD ONC MSQ LAST DATE: NORMAL
RAD ONC MSQ PRESCRIBED FRACTIONAL DOSE: 300 CGRAY
RAD ONC MSQ PRESCRIBED NUMBER OF FRACTIONS: 20
RAD ONC MSQ PRESCRIBED TECHNIQUE: NORMAL
RAD ONC MSQ PRESCRIBED TOTAL DOSE: 6000 CGRAY
RAD ONC MSQ PRESCRIPTION PATTERN COMMENT: NORMAL
RAD ONC MSQ START DATE: NORMAL
RAD ONC MSQ TREATMENT COURSE NUMBER: 1
RAD ONC MSQ TREATMENT SITE: NORMAL

## 2024-03-20 PROCEDURE — 77385 HC INTENSITY-MODULATED RADIATION THERAPY (IMRT), SIMPLE: CPT | Performed by: RADIOLOGY

## 2024-03-20 PROCEDURE — 77014 CHG CT GUIDANCE RADIATION THERAPY FLDS PLACEMENT: CPT | Performed by: RADIOLOGY

## 2024-03-21 ENCOUNTER — OFFICE VISIT (OUTPATIENT)
Dept: PRIMARY CARE | Facility: CLINIC | Age: 74
End: 2024-03-21
Payer: MEDICARE

## 2024-03-21 ENCOUNTER — HOSPITAL ENCOUNTER (OUTPATIENT)
Dept: RADIATION ONCOLOGY | Facility: CLINIC | Age: 74
Setting detail: RADIATION/ONCOLOGY SERIES
Discharge: HOME | End: 2024-03-21
Payer: MEDICARE

## 2024-03-21 VITALS
BODY MASS INDEX: 42.95 KG/M2 | HEIGHT: 69 IN | DIASTOLIC BLOOD PRESSURE: 70 MMHG | HEART RATE: 63 BPM | TEMPERATURE: 98 F | SYSTOLIC BLOOD PRESSURE: 122 MMHG | OXYGEN SATURATION: 96 % | WEIGHT: 290 LBS

## 2024-03-21 DIAGNOSIS — Z51.0 ENCOUNTER FOR ANTINEOPLASTIC RADIATION THERAPY: ICD-10-CM

## 2024-03-21 DIAGNOSIS — N40.0 BENIGN PROSTATIC HYPERPLASIA WITHOUT LOWER URINARY TRACT SYMPTOMS: ICD-10-CM

## 2024-03-21 DIAGNOSIS — G63 POLYNEUROPATHY ASSOCIATED WITH UNDERLYING DISEASE (MULTI): ICD-10-CM

## 2024-03-21 DIAGNOSIS — I25.10 CORONARY ARTERY DISEASE INVOLVING NATIVE CORONARY ARTERY OF NATIVE HEART WITHOUT ANGINA PECTORIS: ICD-10-CM

## 2024-03-21 DIAGNOSIS — I10 PRIMARY HYPERTENSION: ICD-10-CM

## 2024-03-21 DIAGNOSIS — E11.69 TYPE 2 DIABETES MELLITUS WITH OTHER SPECIFIED COMPLICATION, WITHOUT LONG-TERM CURRENT USE OF INSULIN (MULTI): Primary | ICD-10-CM

## 2024-03-21 DIAGNOSIS — C61 MALIGNANT NEOPLASM OF PROSTATE (MULTI): ICD-10-CM

## 2024-03-21 DIAGNOSIS — Z87.442 HISTORY OF KIDNEY STONES: ICD-10-CM

## 2024-03-21 DIAGNOSIS — M15.9 PRIMARY OSTEOARTHRITIS INVOLVING MULTIPLE JOINTS: ICD-10-CM

## 2024-03-21 DIAGNOSIS — Z95.2 HISTORY OF TRANSCATHETER AORTIC VALVE REPLACEMENT (TAVR): ICD-10-CM

## 2024-03-21 DIAGNOSIS — G47.33 OSA (OBSTRUCTIVE SLEEP APNEA): ICD-10-CM

## 2024-03-21 DIAGNOSIS — E78.2 MIXED HYPERLIPIDEMIA: ICD-10-CM

## 2024-03-21 DIAGNOSIS — E03.8 HYPOTHYROIDISM DUE TO HASHIMOTO'S THYROIDITIS: ICD-10-CM

## 2024-03-21 DIAGNOSIS — K21.9 GASTROESOPHAGEAL REFLUX DISEASE WITHOUT ESOPHAGITIS: ICD-10-CM

## 2024-03-21 DIAGNOSIS — E06.3 HYPOTHYROIDISM DUE TO HASHIMOTO'S THYROIDITIS: ICD-10-CM

## 2024-03-21 DIAGNOSIS — Z01.89 ENCOUNTER FOR ROUTINE LABORATORY TESTING: ICD-10-CM

## 2024-03-21 DIAGNOSIS — E66.01 MORBID OBESITY (MULTI): ICD-10-CM

## 2024-03-21 DIAGNOSIS — E55.9 VITAMIN D DEFICIENCY: ICD-10-CM

## 2024-03-21 DIAGNOSIS — Z87.39 HISTORY OF GOUT: ICD-10-CM

## 2024-03-21 PROBLEM — E11.65 HYPERGLYCEMIA DUE TO TYPE 2 DIABETES MELLITUS (MULTI): Status: RESOLVED | Noted: 2023-08-21 | Resolved: 2024-03-21

## 2024-03-21 PROBLEM — E78.5 HLD (HYPERLIPIDEMIA): Status: ACTIVE | Noted: 2023-08-21

## 2024-03-21 PROBLEM — R13.10 DYSPHAGIA: Status: RESOLVED | Noted: 2023-08-21 | Resolved: 2024-03-21

## 2024-03-21 PROBLEM — D73.89 OTHER DISEASES OF SPLEEN: Status: RESOLVED | Noted: 2023-02-08 | Resolved: 2024-03-21

## 2024-03-21 PROBLEM — M19.90 OA (OSTEOARTHRITIS): Status: ACTIVE | Noted: 2023-08-21

## 2024-03-21 PROBLEM — Z79.82 LONG TERM (CURRENT) USE OF ASPIRIN: Status: RESOLVED | Noted: 2023-01-10 | Resolved: 2024-03-21

## 2024-03-21 PROBLEM — Z79.84 LONG TERM (CURRENT) USE OF ORAL HYPOGLYCEMIC DRUGS: Status: RESOLVED | Noted: 2023-01-10 | Resolved: 2024-03-21

## 2024-03-21 PROBLEM — I97.190: Status: RESOLVED | Noted: 2023-02-18 | Resolved: 2024-03-21

## 2024-03-21 PROBLEM — M25.562 PAIN IN LEFT KNEE: Status: RESOLVED | Noted: 2023-06-29 | Resolved: 2024-03-21

## 2024-03-21 PROBLEM — R53.83 FATIGUE: Status: RESOLVED | Noted: 2024-02-09 | Resolved: 2024-03-21

## 2024-03-21 PROBLEM — R42 DIZZINESS AND GIDDINESS: Status: RESOLVED | Noted: 2024-02-09 | Resolved: 2024-03-21

## 2024-03-21 PROBLEM — M19.90 ARTHRITIS: Status: RESOLVED | Noted: 2023-08-21 | Resolved: 2024-03-21

## 2024-03-21 PROBLEM — N20.0 CALCULUS OF KIDNEY: Status: RESOLVED | Noted: 2023-02-08 | Resolved: 2024-03-21

## 2024-03-21 PROBLEM — Z88.8 ALLERGY STATUS TO OTHER DRUGS, MEDICAMENTS AND BIOLOGICAL SUBSTANCES: Status: RESOLVED | Noted: 2023-01-10 | Resolved: 2024-03-21

## 2024-03-21 PROBLEM — S83.207A UNSPECIFIED TEAR OF UNSPECIFIED MENISCUS, CURRENT INJURY, LEFT KNEE, INITIAL ENCOUNTER: Status: RESOLVED | Noted: 2024-02-09 | Resolved: 2024-03-21

## 2024-03-21 PROBLEM — M17.12 ARTHRITIS OF LEFT KNEE: Status: RESOLVED | Noted: 2024-02-09 | Resolved: 2024-03-21

## 2024-03-21 PROBLEM — R97.20 HIGH PROSTATE SPECIFIC ANTIGEN (PSA): Status: RESOLVED | Noted: 2023-10-16 | Resolved: 2024-03-21

## 2024-03-21 PROBLEM — K86.89 OTHER SPECIFIED DISEASES OF PANCREAS (HHS-HCC): Status: RESOLVED | Noted: 2023-02-08 | Resolved: 2024-03-21

## 2024-03-21 LAB
RAD ONC MSQ ACTUAL FRACTIONS DELIVERED: 14
RAD ONC MSQ ACTUAL SESSION DELIVERED DOSE: 300 CGRAY
RAD ONC MSQ ACTUAL TOTAL DOSE: 4200 CGRAY
RAD ONC MSQ ELAPSED DAYS: 17
RAD ONC MSQ LAST DATE: NORMAL
RAD ONC MSQ PRESCRIBED FRACTIONAL DOSE: 300 CGRAY
RAD ONC MSQ PRESCRIBED NUMBER OF FRACTIONS: 20
RAD ONC MSQ PRESCRIBED TECHNIQUE: NORMAL
RAD ONC MSQ PRESCRIBED TOTAL DOSE: 6000 CGRAY
RAD ONC MSQ PRESCRIPTION PATTERN COMMENT: NORMAL
RAD ONC MSQ START DATE: NORMAL
RAD ONC MSQ TREATMENT COURSE NUMBER: 1
RAD ONC MSQ TREATMENT SITE: NORMAL

## 2024-03-21 PROCEDURE — 1160F RVW MEDS BY RX/DR IN RCRD: CPT | Performed by: STUDENT IN AN ORGANIZED HEALTH CARE EDUCATION/TRAINING PROGRAM

## 2024-03-21 PROCEDURE — 1126F AMNT PAIN NOTED NONE PRSNT: CPT | Performed by: STUDENT IN AN ORGANIZED HEALTH CARE EDUCATION/TRAINING PROGRAM

## 2024-03-21 PROCEDURE — G2211 COMPLEX E/M VISIT ADD ON: HCPCS | Performed by: STUDENT IN AN ORGANIZED HEALTH CARE EDUCATION/TRAINING PROGRAM

## 2024-03-21 PROCEDURE — 77014 CHG CT GUIDANCE RADIATION THERAPY FLDS PLACEMENT: CPT | Performed by: RADIOLOGY

## 2024-03-21 PROCEDURE — 3078F DIAST BP <80 MM HG: CPT | Performed by: STUDENT IN AN ORGANIZED HEALTH CARE EDUCATION/TRAINING PROGRAM

## 2024-03-21 PROCEDURE — 3074F SYST BP LT 130 MM HG: CPT | Performed by: STUDENT IN AN ORGANIZED HEALTH CARE EDUCATION/TRAINING PROGRAM

## 2024-03-21 PROCEDURE — 1159F MED LIST DOCD IN RCRD: CPT | Performed by: STUDENT IN AN ORGANIZED HEALTH CARE EDUCATION/TRAINING PROGRAM

## 2024-03-21 PROCEDURE — 1157F ADVNC CARE PLAN IN RCRD: CPT | Performed by: STUDENT IN AN ORGANIZED HEALTH CARE EDUCATION/TRAINING PROGRAM

## 2024-03-21 PROCEDURE — 1036F TOBACCO NON-USER: CPT | Performed by: STUDENT IN AN ORGANIZED HEALTH CARE EDUCATION/TRAINING PROGRAM

## 2024-03-21 PROCEDURE — 3044F HG A1C LEVEL LT 7.0%: CPT | Performed by: STUDENT IN AN ORGANIZED HEALTH CARE EDUCATION/TRAINING PROGRAM

## 2024-03-21 PROCEDURE — 99214 OFFICE O/P EST MOD 30 MIN: CPT | Performed by: STUDENT IN AN ORGANIZED HEALTH CARE EDUCATION/TRAINING PROGRAM

## 2024-03-21 PROCEDURE — 77385 HC INTENSITY-MODULATED RADIATION THERAPY (IMRT), SIMPLE: CPT | Performed by: RADIOLOGY

## 2024-03-21 PROCEDURE — 3048F LDL-C <100 MG/DL: CPT | Performed by: STUDENT IN AN ORGANIZED HEALTH CARE EDUCATION/TRAINING PROGRAM

## 2024-03-21 RX ORDER — ALLOPURINOL 300 MG/1
300 TABLET ORAL DAILY
Qty: 90 TABLET | Refills: 3 | Status: SHIPPED | OUTPATIENT
Start: 2024-03-21 | End: 2025-03-21

## 2024-03-21 RX ORDER — LEVOTHYROXINE SODIUM 150 UG/1
150 TABLET ORAL
Qty: 90 TABLET | Refills: 3 | Status: SHIPPED | OUTPATIENT
Start: 2024-03-21 | End: 2025-03-21

## 2024-03-21 RX ORDER — NIFEDIPINE 60 MG/1
60 TABLET, FILM COATED, EXTENDED RELEASE ORAL
Qty: 90 TABLET | Refills: 3 | Status: SHIPPED | OUTPATIENT
Start: 2024-03-21 | End: 2025-03-21

## 2024-03-21 RX ORDER — METFORMIN HYDROCHLORIDE 500 MG/1
500 TABLET, EXTENDED RELEASE ORAL
Qty: 90 TABLET | Refills: 3 | Status: SHIPPED | OUTPATIENT
Start: 2024-03-21 | End: 2025-03-21

## 2024-03-21 RX ORDER — HYDROCHLOROTHIAZIDE 12.5 MG/1
12.5 TABLET ORAL DAILY
Qty: 90 TABLET | Refills: 3 | Status: SHIPPED | OUTPATIENT
Start: 2024-03-21 | End: 2025-03-21

## 2024-03-21 RX ORDER — NAPROXEN SODIUM 220 MG/1
81 TABLET, FILM COATED ORAL DAILY
Start: 2024-03-21

## 2024-03-21 RX ORDER — AZITHROMYCIN 500 MG/1
500 TABLET, FILM COATED ORAL DAILY PRN
Start: 2024-03-21 | End: 2024-04-30 | Stop reason: ALTCHOICE

## 2024-03-21 RX ORDER — SIMVASTATIN 10 MG/1
10 TABLET, FILM COATED ORAL DAILY
Qty: 90 TABLET | Refills: 3 | Status: SHIPPED | OUTPATIENT
Start: 2024-03-21 | End: 2025-03-21

## 2024-03-21 RX ORDER — OMEPRAZOLE 40 MG/1
40 CAPSULE, DELAYED RELEASE ORAL
Qty: 45 CAPSULE | Refills: 3 | Status: SHIPPED | OUTPATIENT
Start: 2024-03-21 | End: 2025-03-21

## 2024-03-21 RX ORDER — DICLOFENAC SODIUM 10 MG/G
4 GEL TOPICAL 2 TIMES DAILY PRN
Start: 2024-03-21

## 2024-03-21 RX ORDER — ACETAMINOPHEN 500 MG
1000 TABLET ORAL 3 TIMES DAILY PRN
Start: 2024-03-21

## 2024-03-21 RX ORDER — GABAPENTIN 100 MG/1
100 CAPSULE ORAL NIGHTLY
Qty: 30 CAPSULE | Refills: 1 | Status: SHIPPED | OUTPATIENT
Start: 2024-03-21 | End: 2024-04-16 | Stop reason: SDUPTHER

## 2024-03-21 ASSESSMENT — PAIN SCALES - GENERAL: PAINLEVEL: 0-NO PAIN

## 2024-03-21 ASSESSMENT — ENCOUNTER SYMPTOMS
CARDIOVASCULAR NEGATIVE: 1
GASTROINTESTINAL NEGATIVE: 1
CONSTITUTIONAL NEGATIVE: 1
RESPIRATORY NEGATIVE: 1

## 2024-03-21 NOTE — PATIENT INSTRUCTIONS
Diagnoses and all orders for this visit:  Type 2 diabetes mellitus with other specified complication, without long-term current use of insulin (CMS/[facility])  -     metFORMIN  mg 24 hr tablet; Take 1 tablet (500 mg) by mouth once daily with breakfast.  -     Hemoglobin A1C; Future  Coronary artery disease involving native coronary artery of native heart without angina pectoris  -     aspirin 81 mg chewable tablet; Chew 1 tablet (81 mg) once daily.  Benign prostatic hyperplasia without lower urinary tract symptoms  Polyneuropathy associated with underlying disease (CMS/[facility])  -     gabapentin (Neurontin) 100 mg capsule; Take 1 capsule (100 mg) by mouth once daily at bedtime.  -     Follow Up In Primary Care; Future  -     Follow Up In Primary Care; Future  History of transcatheter aortic valve replacement (TAVR)  -     azithromycin (Zithromax) 500 mg tablet; Take 1 tablet (500 mg) by mouth once daily as needed (30-60 minutes prior to dental procedure).  Gastroesophageal reflux disease without esophagitis  -     omeprazole (PriLOSEC) 40 mg DR capsule; Take 1 capsule (40 mg) by mouth every other day.  Hypothyroidism due to Hashimoto's thyroiditis  -     levothyroxine (Synthroid, Levoxyl) 150 mcg tablet; Take 1 tablet (150 mcg) by mouth once daily in the morning. Take before meals.  -     TSH with reflex to Free T4 if abnormal; Future  Primary osteoarthritis involving multiple joints  -     acetaminophen (Tylenol) 500 mg tablet; Take 2 tablets (1,000 mg) by mouth 3 times a day as needed for mild pain (1 - 3), moderate pain (4 - 6) or fever (temp greater than 38.0 C).  -     diclofenac sodium (Voltaren) 1 % gel; Apply 4.5 inches (4 g) topically 2 times a day as needed (pain).  TODD (obstructive sleep apnea)  -     CBC and Auto Differential; Future  Morbid obesity (CMS/[facility])  -     Hepatic Function Panel; Future  History of kidney stones  -     allopurinol (Zyloprim) 300 mg tablet; Take 1 tablet (300 mg) by mouth once  daily.  Mixed hyperlipidemia  -     simvastatin (Zocor) 10 mg tablet; Take 1 tablet (10 mg) by mouth once daily.  Primary hypertension  -     hydroCHLOROthiazide (Microzide) 12.5 mg tablet; Take 1 tablet (12.5 mg) by mouth once daily.  -     NIFEdipine ER (NIFEdipine CC) 60 mg 24 hr tablet; Take 1 tablet (60 mg) by mouth once daily with breakfast.  -     Basic Metabolic Panel; Future  Vitamin D deficiency  History of gout  -     allopurinol (Zyloprim) 300 mg tablet; Take 1 tablet (300 mg) by mouth once daily.  -     Uric Acid; Future  Encounter for routine laboratory testing  -     CBC and Auto Differential; Future  -     Basic Metabolic Panel; Future  -     Hepatic Function Panel; Future  -     Hemoglobin A1C; Future  -     TSH with reflex to Free T4 if abnormal; Future  -     Uric Acid; Future  -     Follow Up In Primary Care; Future     - Significant medication and problem list reconciliation done today.  - Encouraged continued diet and exercise modification. Encouraged continued CPAP.  - Vitals look great. Do not need to make significant changes at this time.  - Patient noting worsening osteoarthritic pains in his knees. Counseled OTC medications.   - Patient's main concern today is his worsening neuropathy due to T2DM. Discussed different options, will trial low-dose gabapentin and do routine telehealth visits to discuss efficacy and slowly up-titrate.  - Patient had labwork done for this appointment. Discussed today. Will order labwork for the patient's next appointment.   - Sugars look great. No changes at this time.   - Thyroid looks great. No changes at this time.  - Cholesterol (triglycerides) somewhat elevated, I suspect that these will come down as the patient continues to lose weight.  - WBC count somewhat low, I suspect this is due to prostate cancer treatment. No further evaluation needed, follow-up every six months.

## 2024-03-21 NOTE — PROGRESS NOTES
[facility]: [facility]  PROGRESS NOTE      Jerald Sandoval is a 73 y.o. male that is presenting today for Follow-up (6 month).    Assessment/Plan   Diagnoses and all orders for this visit:  Type 2 diabetes mellitus with other specified complication, without long-term current use of insulin (CMS/[facility])  -     metFORMIN  mg 24 hr tablet; Take 1 tablet (500 mg) by mouth once daily with breakfast.  -     Hemoglobin A1C; Future  Coronary artery disease involving native coronary artery of native heart without angina pectoris  -     aspirin 81 mg chewable tablet; Chew 1 tablet (81 mg) once daily.  Benign prostatic hyperplasia without lower urinary tract symptoms  Polyneuropathy associated with underlying disease (CMS/[facility])  -     gabapentin (Neurontin) 100 mg capsule; Take 1 capsule (100 mg) by mouth once daily at bedtime.  -     Follow Up In Primary Care; Future  -     Follow Up In Primary Care; Future  History of transcatheter aortic valve replacement (TAVR)  -     azithromycin (Zithromax) 500 mg tablet; Take 1 tablet (500 mg) by mouth once daily as needed (30-60 minutes prior to dental procedure).  Gastroesophageal reflux disease without esophagitis  -     omeprazole (PriLOSEC) 40 mg DR capsule; Take 1 capsule (40 mg) by mouth every other day.  Hypothyroidism due to Hashimoto's thyroiditis  -     levothyroxine (Synthroid, Levoxyl) 150 mcg tablet; Take 1 tablet (150 mcg) by mouth once daily in the morning. Take before meals.  -     TSH with reflex to Free T4 if abnormal; Future  Primary osteoarthritis involving multiple joints  -     acetaminophen (Tylenol) 500 mg tablet; Take 2 tablets (1,000 mg) by mouth 3 times a day as needed for mild pain (1 - 3), moderate pain (4 - 6) or fever (temp greater than 38.0 C).  -     diclofenac sodium (Voltaren) 1 % gel; Apply 4.5 inches (4 g) topically 2 times a day as needed (pain).  TODD (obstructive sleep apnea)  -     CBC and Auto Differential; Future  Morbid  obesity (CMS/HCC)  -     Hepatic Function Panel; Future  History of kidney stones  -     allopurinol (Zyloprim) 300 mg tablet; Take 1 tablet (300 mg) by mouth once daily.  Mixed hyperlipidemia  -     simvastatin (Zocor) 10 mg tablet; Take 1 tablet (10 mg) by mouth once daily.  Primary hypertension  -     hydroCHLOROthiazide (Microzide) 12.5 mg tablet; Take 1 tablet (12.5 mg) by mouth once daily.  -     NIFEdipine ER (NIFEdipine CC) 60 mg 24 hr tablet; Take 1 tablet (60 mg) by mouth once daily with breakfast.  -     Basic Metabolic Panel; Future  Vitamin D deficiency  History of gout  -     allopurinol (Zyloprim) 300 mg tablet; Take 1 tablet (300 mg) by mouth once daily.  -     Uric Acid; Future  Encounter for routine laboratory testing  -     CBC and Auto Differential; Future  -     Basic Metabolic Panel; Future  -     Hepatic Function Panel; Future  -     Hemoglobin A1C; Future  -     TSH with reflex to Free T4 if abnormal; Future  -     Uric Acid; Future  -     Follow Up In Primary Care; Future    - Significant medication and problem list reconciliation done today.  - Encouraged continued diet and exercise modification. Encouraged continued CPAP.  - Vitals look great. Do not need to make significant changes at this time.  - Patient noting worsening osteoarthritic pains in his knees. Counseled OTC medications.   - Patient's main concern today is his worsening neuropathy due to T2DM. Discussed different options, will trial low-dose gabapentin and do routine telehealth visits to discuss efficacy and slowly up-titrate.  - Patient had labwork done for this appointment. Discussed today. Will order labwork for the patient's next appointment.   - Sugars look great. No changes at this time.   - Thyroid looks great. No changes at this time.  - Cholesterol (triglycerides) somewhat elevated, I suspect that these will come down as the patient continues to lose weight.  - WBC count somewhat low, I suspect this is due to  prostate cancer treatment. No further evaluation needed, follow-up every six months.    Subjective   - The patient otherwise feels well and denies any acute symptoms or concerns at this time.  - The patient denies any changes or progression of their chronic medical problems.  - The patient denies any problems or concerns with their medications.      Review of Systems   Constitutional: Negative.    Respiratory: Negative.     Cardiovascular: Negative.    Gastrointestinal: Negative.    All other systems reviewed and are negative.     Objective   Vitals:    03/21/24 0915   BP: 122/70   Pulse: 63   Temp: 36.7 °C (98 °F)   SpO2: 96%      Body mass index is 42.23 kg/m².  Physical Exam  Vitals and nursing note reviewed.   Constitutional:       General: He is not in acute distress.  Neck:      Vascular: No carotid bruit.   Cardiovascular:      Rate and Rhythm: Normal rate and regular rhythm.      Heart sounds: Normal heart sounds.   Pulmonary:      Effort: Pulmonary effort is normal.      Breath sounds: Normal breath sounds.   Musculoskeletal:         General: No swelling.   Neurological:      Mental Status: He is alert. Mental status is at baseline.   Psychiatric:         Mood and Affect: Mood normal.       Diagnostic Results   Lab Results   Component Value Date    GLUCOSE 116 (H) 03/14/2024    CALCIUM 9.4 03/14/2024     03/14/2024    K 3.6 03/14/2024    CO2 29 03/14/2024     03/14/2024    BUN 12 03/14/2024    CREATININE 1.18 03/14/2024     Lab Results   Component Value Date    ALT 26 03/14/2024    AST 27 03/14/2024    ALKPHOS 80 03/14/2024    BILITOT 0.6 03/14/2024     Lab Results   Component Value Date    WBC 3.6 (L) 03/14/2024    HGB 13.4 (L) 03/14/2024    HCT 41.9 03/14/2024    MCV 86 03/14/2024     03/14/2024     Lab Results   Component Value Date    CHOL 131 03/14/2024    CHOL 158 09/14/2023    CHOL 146 03/10/2023     Lab Results   Component Value Date    HDL 35.7 03/14/2024    HDL 41 09/14/2023     "HDL 39.4 (A) 03/10/2023     Lab Results   Component Value Date    LDLCALC 59 03/14/2024    LDLCALC 84 09/14/2023    LDLCALC 77 09/06/2022     Lab Results   Component Value Date    TRIG 183 (H) 03/14/2024    TRIG 165 (H) 09/14/2023    TRIG 225 (H) 03/10/2023     No components found for: \"CHOLHDL\"  Lab Results   Component Value Date    HGBA1C 6.1 (H) 03/14/2024     Other labs not included in the list above were reviewed either before or during this encounter.    History    Past Medical History:   Diagnosis Date    Diabetes mellitus (CMS/HCC)     \"pre-diabetic\" per pt    Gout     History of stomach ulcers     d/t daily aleve per pt    History of transcatheter aortic valve replacement (TAVR)     Hyperlipemia     Hypertension     Hypothyroidism     Obstructive sleep apnea treated with continuous positive airway pressure (CPAP)     Sleep apnea      Past Surgical History:   Procedure Laterality Date    AORTIC VALVE REPLACEMENT N/A     CARPAL TUNNEL RELEASE Bilateral     ROTATOR CUFF REPAIR Bilateral     THUMB AMPUTATION Left     d/t trauma     Family History   Problem Relation Name Age of Onset    Heart disease Mother      Lung cancer Father      Heart disease Father      Other (Other) Father          CAB x5    Lung cancer Brother      Heart attack Son      Heart attack Son       Social History     Socioeconomic History    Marital status:      Spouse name: Not on file    Number of children: Not on file    Years of education: Not on file    Highest education level: Not on file   Occupational History    Not on file   Tobacco Use    Smoking status: Never     Passive exposure: Never    Smokeless tobacco: Never   Vaping Use    Vaping Use: Never used   Substance and Sexual Activity    Alcohol use: Yes     Alcohol/week: 3.0 standard drinks of alcohol     Types: 1 Cans of beer, 2 Shots of liquor per week    Drug use: Never    Sexual activity: Defer   Other Topics Concern    Not on file   Social History Narrative    Not on " "file     Social Determinants of Health     Financial Resource Strain: Low Risk  (3/11/2024)    Overall Financial Resource Strain (CARDIA)     Difficulty of Paying Living Expenses: Not hard at all   Food Insecurity: Unknown (3/11/2024)    Hunger Vital Sign     Worried About Running Out of Food in the Last Year: Never true     Ran Out of Food in the Last Year: Not on file   Transportation Needs: No Transportation Needs (3/11/2024)    PRAPARE - Transportation     Lack of Transportation (Medical): No     Lack of Transportation (Non-Medical): No   Physical Activity: Not on file   Stress: No Stress Concern Present (3/11/2024)    [employer] - Occupational Stress Questionnaire     Feeling of Stress : Not at all   Social Connections: Not on file   Intimate Partner Violence: Not on file   Housing Stability: Unknown (3/11/2024)    Housing Stability Vital Sign     Unable to Pay for Housing in the Last Year: No     Number of Places Lived in the Last Year: Not on file     Unstable Housing in the Last Year: No     Allergies   Allergen Reactions    Cephalosporins Rash     \"Skin peels off\"    Penicillin Rash     \"Skin peels off\"     Current Outpatient Medications on File Prior to Visit   Medication Sig Dispense Refill    [DISCONTINUED] allopurinol (Zyloprim) 300 mg tablet Take 1 tablet (300 mg) by mouth once daily.      [DISCONTINUED] aspirin 81 mg chewable tablet Chew 1 tablet (81 mg) once daily.      [DISCONTINUED] azithromycin (Zithromax) 500 mg tablet TAKE 1 TABLET BY MOUTH 30-60 MINUTES BEFORE YOUR DENTAL PROCEDURE.      [DISCONTINUED] diphenhydrAMINE (BENADryl) 50 mg capsule Take 1 capsule (50 mg) by mouth once daily at bedtime.      [DISCONTINUED] glucosam/chond/hyalu/CF borate (MOVE FREE JOINT HEALTH ORAL) Take by mouth. As directed      [DISCONTINUED] hydroCHLOROthiazide (HYDRODiuril) 12.5 mg tablet Take 1 tablet (12.5 mg) by mouth once daily.      [DISCONTINUED] levothyroxine (Synthroid, " Levoxyl) 150 mcg tablet Take 1 tablet (150 mcg) by mouth once daily in the morning. Take before meals.      [DISCONTINUED] metFORMIN XR (Glucophage-XR) 500 mg 24 hr tablet Take 1 tablet (500 mg) by mouth once daily. With meal      [DISCONTINUED] NIFEdipine CC 60 mg 24 hr tablet Take 1 tablet (60 mg) by mouth once daily.      [DISCONTINUED] omeprazole (PriLOSEC) 40 mg DR capsule Take 1 capsule (40 mg) by mouth every other day.      [DISCONTINUED] simvastatin (Zocor) 10 mg tablet Take 1 tablet (10 mg) by mouth once daily.      [DISCONTINUED] acetaminophen (Tylenol) 325 mg tablet Take 2 tablets (650 mg) by mouth every 6 hours if needed for mild pain (1 - 3).       No current facility-administered medications on file prior to visit.     Immunization History   Administered Date(s) Administered    Flu vaccine, quadrivalent, high-dose, preservative free, age 65y+ (FLUZONE) 10/24/2021, 09/13/2023    Hepatitis B vaccine, pediatric/adolescent (RECOMBIVAX, ENGERIX) 01/01/1988    Influenza, High Dose Seasonal, Preservative Free 10/05/2015, 11/29/2016, 09/07/2017, 09/14/2018, 10/01/2019    Influenza, Seasonal, Quadrivalent, Adjuvanted 10/05/2020, 09/27/2022    Influenza, seasonal, injectable 12/17/2012, 10/16/2013, 10/05/2015    Influenza, seasonal, injectable, preservative free 12/23/2013    Pfizer Purple Cap SARS-CoV-2 03/11/2021, 04/08/2021, 12/08/2021    Pneumococcal conjugate vaccine, 13-valent (PREVNAR 13) 11/29/2016, 07/05/2018    Td vaccine, age 7 years and older (TDVAX) 10/01/2008    Zoster vaccine, recombinant, adult (SHINGRIX) 09/18/2023, 11/18/2023    Zoster, live 02/17/2017     Patient's medical history was reviewed and updated either before or during this encounter.       Jimmie Rogers MD

## 2024-03-22 ENCOUNTER — HOSPITAL ENCOUNTER (OUTPATIENT)
Dept: RADIATION ONCOLOGY | Facility: CLINIC | Age: 74
Setting detail: RADIATION/ONCOLOGY SERIES
Discharge: HOME | End: 2024-03-22
Payer: MEDICARE

## 2024-03-22 DIAGNOSIS — C61 MALIGNANT NEOPLASM OF PROSTATE (MULTI): ICD-10-CM

## 2024-03-22 DIAGNOSIS — Z51.0 ENCOUNTER FOR ANTINEOPLASTIC RADIATION THERAPY: ICD-10-CM

## 2024-03-22 LAB
RAD ONC MSQ ACTUAL FRACTIONS DELIVERED: 15
RAD ONC MSQ ACTUAL SESSION DELIVERED DOSE: 300 CGRAY
RAD ONC MSQ ACTUAL TOTAL DOSE: 4500 CGRAY
RAD ONC MSQ ELAPSED DAYS: 18
RAD ONC MSQ LAST DATE: NORMAL
RAD ONC MSQ PRESCRIBED FRACTIONAL DOSE: 300 CGRAY
RAD ONC MSQ PRESCRIBED NUMBER OF FRACTIONS: 20
RAD ONC MSQ PRESCRIBED TECHNIQUE: NORMAL
RAD ONC MSQ PRESCRIBED TOTAL DOSE: 6000 CGRAY
RAD ONC MSQ PRESCRIPTION PATTERN COMMENT: NORMAL
RAD ONC MSQ START DATE: NORMAL
RAD ONC MSQ TREATMENT COURSE NUMBER: 1
RAD ONC MSQ TREATMENT SITE: NORMAL

## 2024-03-22 PROCEDURE — 77385 HC INTENSITY-MODULATED RADIATION THERAPY (IMRT), SIMPLE: CPT | Performed by: RADIOLOGY

## 2024-03-22 PROCEDURE — 77014 CHG CT GUIDANCE RADIATION THERAPY FLDS PLACEMENT: CPT | Performed by: RADIOLOGY

## 2024-03-25 ENCOUNTER — HOSPITAL ENCOUNTER (OUTPATIENT)
Dept: RADIATION ONCOLOGY | Facility: CLINIC | Age: 74
Setting detail: RADIATION/ONCOLOGY SERIES
Discharge: HOME | End: 2024-03-25
Payer: MEDICARE

## 2024-03-25 ENCOUNTER — RADIATION ONCOLOGY OTV (OUTPATIENT)
Dept: RADIATION ONCOLOGY | Facility: CLINIC | Age: 74
End: 2024-03-25
Payer: MEDICARE

## 2024-03-25 VITALS
BODY MASS INDEX: 42.05 KG/M2 | OXYGEN SATURATION: 96 % | SYSTOLIC BLOOD PRESSURE: 142 MMHG | WEIGHT: 288.8 LBS | RESPIRATION RATE: 16 BRPM | TEMPERATURE: 96.1 F | DIASTOLIC BLOOD PRESSURE: 73 MMHG | HEART RATE: 72 BPM

## 2024-03-25 DIAGNOSIS — Z51.0 ENCOUNTER FOR ANTINEOPLASTIC RADIATION THERAPY: ICD-10-CM

## 2024-03-25 DIAGNOSIS — C61 MALIGNANT NEOPLASM OF PROSTATE (MULTI): ICD-10-CM

## 2024-03-25 LAB
RAD ONC MSQ ACTUAL FRACTIONS DELIVERED: 16
RAD ONC MSQ ACTUAL SESSION DELIVERED DOSE: 300 CGRAY
RAD ONC MSQ ACTUAL TOTAL DOSE: 4800 CGRAY
RAD ONC MSQ ELAPSED DAYS: 21
RAD ONC MSQ LAST DATE: NORMAL
RAD ONC MSQ PRESCRIBED FRACTIONAL DOSE: 300 CGRAY
RAD ONC MSQ PRESCRIBED NUMBER OF FRACTIONS: 20
RAD ONC MSQ PRESCRIBED TECHNIQUE: NORMAL
RAD ONC MSQ PRESCRIBED TOTAL DOSE: 6000 CGRAY
RAD ONC MSQ PRESCRIPTION PATTERN COMMENT: NORMAL
RAD ONC MSQ START DATE: NORMAL
RAD ONC MSQ TREATMENT COURSE NUMBER: 1
RAD ONC MSQ TREATMENT SITE: NORMAL

## 2024-03-25 PROCEDURE — 77427 RADIATION TX MANAGEMENT X5: CPT | Performed by: RADIOLOGY

## 2024-03-25 PROCEDURE — 77014 CHG CT GUIDANCE RADIATION THERAPY FLDS PLACEMENT: CPT | Performed by: RADIOLOGY

## 2024-03-25 PROCEDURE — 77385 HC INTENSITY-MODULATED RADIATION THERAPY (IMRT), SIMPLE: CPT | Performed by: RADIOLOGY

## 2024-03-25 ASSESSMENT — ENCOUNTER SYMPTOMS
LOSS OF SENSATION IN FEET: 0
DEPRESSION: 0
OCCASIONAL FEELINGS OF UNSTEADINESS: 0

## 2024-03-25 ASSESSMENT — PAIN SCALES - GENERAL: PAINLEVEL: 0-NO PAIN

## 2024-03-25 ASSESSMENT — PATIENT HEALTH QUESTIONNAIRE - PHQ9
1. LITTLE INTEREST OR PLEASURE IN DOING THINGS: NOT AT ALL
SUM OF ALL RESPONSES TO PHQ9 QUESTIONS 1 AND 2: 0
2. FEELING DOWN, DEPRESSED OR HOPELESS: NOT AT ALL

## 2024-03-25 NOTE — PROGRESS NOTES
Radiation Oncology On Treatment Visit    Patient Name:  Jerald Sandoval  MRN:  [id number]  :  1950    Referring Provider: No ref. provider found  Primary Care Provider: Jimmie Rogers MD  Care Team: Patient Care Team:  Jimmie Rogers MD as PCP - General (Internal Medicine)  Jimmie Rogers MD as PCP - [employer] Advantage PCP  Stan Mahmood MD as Consulting Physician (Hematology and Oncology)    Date of Service: 3/25/2024     Diagnosis:   Specialty Problems          Radiation Oncology Problems    Malignant neoplasm of prostate (CMS/HCC)         Treatment Summary:  Radiation Treatments       Active   Prostate/SV (Started on 3/4/2024)   Most recent fraction: 300 cGy given on 3/22/2024   Total given: 4,500 cGy / 6,000 cGy  (15 of 20 fractions)   Elapsed Days: 18   Technique: VMAT           Completed   No historical radiation treatments to show.             SUBJECTIVE: The patient feels well and has no issues.         OBJECTIVE:   Vital Signs:  /73 (BP Location: Left arm, Patient Position: Sitting, BP Cuff Size: Large adult)   Pulse 72   Temp 35.6 °C (96.1 °F) (Temporal)   Resp 16   Wt 131 kg (288 lb 12.8 oz)   SpO2 96%   BMI 42.05 kg/m²    Pain Scale: The patient's current pain level was assessed.  They report currently having a pain of 0 out of 10.    Other Pertinent Findings:     Toxicity Assessment          3/8/2024    10:29 3/11/2024    10:07 3/18/2024    09:22 3/25/2024    09:32   Toxicity Assessment   Treatment Site Pelvis - male Pelvis - male Pelvis - male Pelvis - male   Anorexia Grade 0 Grade 0 Grade 0 Grade 0   Anxiety Grade 0 Grade 0 Grade 0 Grade 0   Dehydration Grade 0 Grade 0 Grade 0 Grade 0   Depression Grade 0 Grade 0 Grade 0 Grade 0   Dermatitis Radiation Grade 0 Grade 0 Grade 0 Grade 0   Diarrhea Grade 0 Grade 0 Grade 0 Grade 0   Fatigue Grade 0 Grade 1 Grade 0 Grade 0   Fibrosis Deep Connective Tissue Grade 0 Grade 0 Grade 0 Grade 0   Fracture Grade 0 Grade 0 Grade 0  Grade 0   Nausea Grade 0 Grade 0 Grade 0 Grade 0   Pain Grade 0 Grade 0 Grade 0 Grade 0   Treatment Related Secondary Malignancy Grade 0 Grade 0 Grade 0 Grade 0   Tumor Pain Grade 0 Grade 0 Grade 0 Grade 0   Vomiting Grade 0 Grade 0 Grade 0 Grade 0   Abdominal Pain Grade 0 Grade 0 Grade 0 Grade 0   Bloating Grade 0 Grade 0 Grade 0 Grade 0   Constipation Grade 0 Grade 0 Grade 0 Grade 0   Dysphagia Grade 0 Grade 0 Grade 0 Grade 0   Enterovesical Fistula Grade 0 Grade 0 Grade 0 Grade 0   Fecal Incontinence Grade 0 Grade 0 Grade 0 Grade 0   Lower Gastrointestinal Hemorrhage Grade 0 Grade 0 Grade 0 Grade 0   Mucositis Oral Grade 0 Grade 0 Grade 0 Grade 0   Proctitis Grade 0 Grade 0 Grade 0 Grade 0   Rectal Hemorrhage Grade 0 Grade 0 Grade 0 Grade 0   Rectal Pain Grade 0 Grade 0 Grade 0 Grade 0   Rectal Ulcer Grade 0 Grade 0 Grade 0 Grade 0   Small Intestinal Obstruction Grade 0 Grade 0 Grade 0 Grade 0   Cystitis Noninfective Grade 0 Grade 0 Grade 0 Grade 0   Hematuria Grade 0 Grade 0 Grade 0 Grade 0   Urinary Incontinence Grade 1 Grade 1 Grade 1 Grade 1   Erectile Dysfunction Grade 0   Grade 0   Dry Mouth Grade 0      Edema Limbs Grade 0 Grade 0 Grade 0 Grade 0   Gastric Fistula Grade 0 Grade 0 Grade 0 Grade 0   Rectal Mucositis Grade 0 Grade 0 Grade 0 Grade 0   Rectal Stenosis Grade 0 Grade 0 Grade 0 Grade 0   Bladder Spasm Grade 0 Grade 0 Grade 0 Grade 0   Urinary Frequency Grade 0 Grade 1 Grade 1 Grade 1   Urinary Retention Grade 0 Grade 0 Grade 0 Grade 0   Urinary Tract Obstruction Grade 0 Grade 0 Grade 0 Grade 0   Urinary Tract Pain Grade 0 Grade 0 Grade 0 Grade 0   Urinary Urgency Grade 0 Grade 1 Grade 1 Grade 1   Ejaculation Disorder Grade 0 Grade 0 Grade 0 Grade 0        Assessment / Plan:  The patient is tolerating radiation therapy as anticipated.  Continue per current treatment plan.

## 2024-03-26 ENCOUNTER — HOSPITAL ENCOUNTER (OUTPATIENT)
Dept: RADIATION ONCOLOGY | Facility: CLINIC | Age: 74
Setting detail: RADIATION/ONCOLOGY SERIES
Discharge: HOME | End: 2024-03-26
Payer: MEDICARE

## 2024-03-26 DIAGNOSIS — Z51.0 ENCOUNTER FOR ANTINEOPLASTIC RADIATION THERAPY: ICD-10-CM

## 2024-03-26 DIAGNOSIS — C61 MALIGNANT NEOPLASM OF PROSTATE (MULTI): ICD-10-CM

## 2024-03-26 LAB
RAD ONC MSQ ACTUAL FRACTIONS DELIVERED: 17
RAD ONC MSQ ACTUAL SESSION DELIVERED DOSE: 300 CGRAY
RAD ONC MSQ ACTUAL TOTAL DOSE: 5100 CGRAY
RAD ONC MSQ ELAPSED DAYS: 22
RAD ONC MSQ LAST DATE: NORMAL
RAD ONC MSQ PRESCRIBED FRACTIONAL DOSE: 300 CGRAY
RAD ONC MSQ PRESCRIBED NUMBER OF FRACTIONS: 20
RAD ONC MSQ PRESCRIBED TECHNIQUE: NORMAL
RAD ONC MSQ PRESCRIBED TOTAL DOSE: 6000 CGRAY
RAD ONC MSQ PRESCRIPTION PATTERN COMMENT: NORMAL
RAD ONC MSQ START DATE: NORMAL
RAD ONC MSQ TREATMENT COURSE NUMBER: 1
RAD ONC MSQ TREATMENT SITE: NORMAL

## 2024-03-26 PROCEDURE — 77385 HC INTENSITY-MODULATED RADIATION THERAPY (IMRT), SIMPLE: CPT | Performed by: RADIOLOGY

## 2024-03-26 PROCEDURE — 77014 CHG CT GUIDANCE RADIATION THERAPY FLDS PLACEMENT: CPT | Performed by: RADIOLOGY

## 2024-03-26 PROCEDURE — 77336 RADIATION PHYSICS CONSULT: CPT | Performed by: RADIOLOGY

## 2024-03-27 ENCOUNTER — HOSPITAL ENCOUNTER (OUTPATIENT)
Dept: RADIATION ONCOLOGY | Facility: CLINIC | Age: 74
Setting detail: RADIATION/ONCOLOGY SERIES
Discharge: HOME | End: 2024-03-27
Payer: MEDICARE

## 2024-03-27 DIAGNOSIS — Z51.0 ENCOUNTER FOR ANTINEOPLASTIC RADIATION THERAPY: ICD-10-CM

## 2024-03-27 DIAGNOSIS — C61 MALIGNANT NEOPLASM OF PROSTATE (MULTI): ICD-10-CM

## 2024-03-27 LAB
RAD ONC MSQ ACTUAL FRACTIONS DELIVERED: 18
RAD ONC MSQ ACTUAL SESSION DELIVERED DOSE: 300 CGRAY
RAD ONC MSQ ACTUAL TOTAL DOSE: 5400 CGRAY
RAD ONC MSQ ELAPSED DAYS: 23
RAD ONC MSQ LAST DATE: NORMAL
RAD ONC MSQ PRESCRIBED FRACTIONAL DOSE: 300 CGRAY
RAD ONC MSQ PRESCRIBED NUMBER OF FRACTIONS: 20
RAD ONC MSQ PRESCRIBED TECHNIQUE: NORMAL
RAD ONC MSQ PRESCRIBED TOTAL DOSE: 6000 CGRAY
RAD ONC MSQ PRESCRIPTION PATTERN COMMENT: NORMAL
RAD ONC MSQ START DATE: NORMAL
RAD ONC MSQ TREATMENT COURSE NUMBER: 1
RAD ONC MSQ TREATMENT SITE: NORMAL

## 2024-03-27 PROCEDURE — 77014 CHG CT GUIDANCE RADIATION THERAPY FLDS PLACEMENT: CPT | Performed by: RADIOLOGY

## 2024-03-27 PROCEDURE — 77385 HC INTENSITY-MODULATED RADIATION THERAPY (IMRT), SIMPLE: CPT | Performed by: RADIOLOGY

## 2024-03-28 ENCOUNTER — HOSPITAL ENCOUNTER (OUTPATIENT)
Dept: RADIATION ONCOLOGY | Facility: CLINIC | Age: 74
Setting detail: RADIATION/ONCOLOGY SERIES
Discharge: HOME | End: 2024-03-28
Payer: MEDICARE

## 2024-03-28 DIAGNOSIS — Z51.0 ENCOUNTER FOR ANTINEOPLASTIC RADIATION THERAPY: ICD-10-CM

## 2024-03-28 DIAGNOSIS — C61 MALIGNANT NEOPLASM OF PROSTATE (MULTI): ICD-10-CM

## 2024-03-28 LAB
RAD ONC MSQ ACTUAL FRACTIONS DELIVERED: 19
RAD ONC MSQ ACTUAL SESSION DELIVERED DOSE: 300 CGRAY
RAD ONC MSQ ACTUAL TOTAL DOSE: 5700 CGRAY
RAD ONC MSQ ELAPSED DAYS: 24
RAD ONC MSQ LAST DATE: NORMAL
RAD ONC MSQ PRESCRIBED FRACTIONAL DOSE: 300 CGRAY
RAD ONC MSQ PRESCRIBED NUMBER OF FRACTIONS: 20
RAD ONC MSQ PRESCRIBED TECHNIQUE: NORMAL
RAD ONC MSQ PRESCRIBED TOTAL DOSE: 6000 CGRAY
RAD ONC MSQ PRESCRIPTION PATTERN COMMENT: NORMAL
RAD ONC MSQ START DATE: NORMAL
RAD ONC MSQ TREATMENT COURSE NUMBER: 1
RAD ONC MSQ TREATMENT SITE: NORMAL

## 2024-03-28 PROCEDURE — 77385 HC INTENSITY-MODULATED RADIATION THERAPY (IMRT), SIMPLE: CPT | Performed by: RADIOLOGY

## 2024-03-28 PROCEDURE — 77014 CHG CT GUIDANCE RADIATION THERAPY FLDS PLACEMENT: CPT | Performed by: RADIOLOGY

## 2024-03-29 ENCOUNTER — DOCUMENTATION (OUTPATIENT)
Dept: RADIATION ONCOLOGY | Facility: CLINIC | Age: 74
End: 2024-03-29

## 2024-03-29 ENCOUNTER — RADIATION ONCOLOGY OTV (OUTPATIENT)
Dept: RADIATION ONCOLOGY | Facility: CLINIC | Age: 74
End: 2024-03-29
Payer: MEDICARE

## 2024-03-29 ENCOUNTER — HOSPITAL ENCOUNTER (OUTPATIENT)
Dept: RADIATION ONCOLOGY | Facility: CLINIC | Age: 74
Setting detail: RADIATION/ONCOLOGY SERIES
Discharge: HOME | End: 2024-03-29
Payer: MEDICARE

## 2024-03-29 VITALS
RESPIRATION RATE: 16 BRPM | BODY MASS INDEX: 41.97 KG/M2 | OXYGEN SATURATION: 95 % | TEMPERATURE: 97.2 F | DIASTOLIC BLOOD PRESSURE: 79 MMHG | SYSTOLIC BLOOD PRESSURE: 128 MMHG | WEIGHT: 288.25 LBS | HEART RATE: 80 BPM

## 2024-03-29 DIAGNOSIS — Z51.0 ENCOUNTER FOR ANTINEOPLASTIC RADIATION THERAPY: ICD-10-CM

## 2024-03-29 DIAGNOSIS — C61 MALIGNANT NEOPLASM OF PROSTATE (MULTI): ICD-10-CM

## 2024-03-29 LAB
RAD ONC MSQ ACTUAL FRACTIONS DELIVERED: 20
RAD ONC MSQ ACTUAL SESSION DELIVERED DOSE: 300 CGRAY
RAD ONC MSQ ACTUAL TOTAL DOSE: 6000 CGRAY
RAD ONC MSQ ELAPSED DAYS: 25
RAD ONC MSQ LAST DATE: NORMAL
RAD ONC MSQ PRESCRIBED FRACTIONAL DOSE: 300 CGRAY
RAD ONC MSQ PRESCRIBED NUMBER OF FRACTIONS: 20
RAD ONC MSQ PRESCRIBED TECHNIQUE: NORMAL
RAD ONC MSQ PRESCRIBED TOTAL DOSE: 6000 CGRAY
RAD ONC MSQ PRESCRIPTION PATTERN COMMENT: NORMAL
RAD ONC MSQ START DATE: NORMAL
RAD ONC MSQ TREATMENT COURSE NUMBER: 1
RAD ONC MSQ TREATMENT SITE: NORMAL

## 2024-03-29 PROCEDURE — 77385 HC INTENSITY-MODULATED RADIATION THERAPY (IMRT), SIMPLE: CPT | Performed by: RADIOLOGY

## 2024-03-29 PROCEDURE — 77014 CHG CT GUIDANCE RADIATION THERAPY FLDS PLACEMENT: CPT | Performed by: RADIOLOGY

## 2024-03-29 ASSESSMENT — COLUMBIA-SUICIDE SEVERITY RATING SCALE - C-SSRS
2. HAVE YOU ACTUALLY HAD ANY THOUGHTS OF KILLING YOURSELF?: NO
1. IN THE PAST MONTH, HAVE YOU WISHED YOU WERE DEAD OR WISHED YOU COULD GO TO SLEEP AND NOT WAKE UP?: NO

## 2024-03-29 ASSESSMENT — PAIN SCALES - GENERAL: PAINLEVEL: 0-NO PAIN

## 2024-03-29 ASSESSMENT — ENCOUNTER SYMPTOMS
LOSS OF SENSATION IN FEET: 0
OCCASIONAL FEELINGS OF UNSTEADINESS: 0

## 2024-03-29 NOTE — PROGRESS NOTES
Radiation Oncology On Treatment Visit    Patient Name:  Jerald Sandoval  MRN:  [id number]  :  1950    Referring Provider: No ref. provider found  Primary Care Provider: Jimmie Rogers MD  Care Team: Patient Care Team:  Jimmie Rogers MD as PCP - General (Internal Medicine)  Jimmie Rogers MD as PCP - [employer] Advantage PCP  Stan Mahmood MD as Consulting Physician (Hematology and Oncology)    Date of Service: 3/29/2024     Diagnosis:   Specialty Problems          Radiation Oncology Problems    Malignant neoplasm of prostate (CMS/HCC)         Treatment Summary:  Radiation Treatments       Active   No active radiation treatments to show.     Completed   Prostate/SV (Started on 3/4/2024)   Most recent fraction: 300 cGy given on 3/29/2024   Total given: 6,000 cGy / 6,000 cGy  (20 of 20 fractions)   Elapsed Days: 25   Technique: VMAT                   SUBJECTIVE: The patient feels well and has no issues.  He has some LUTS but does not want any meds for them.      OBJECTIVE:   Vital Signs:  /79 (BP Location: Left arm, Patient Position: Sitting, BP Cuff Size: Adult)   Pulse 80   Temp 36.2 °C (97.2 °F) (Temporal)   Resp 16   Wt 131 kg (288 lb 4 oz)   SpO2 95%   BMI 41.97 kg/m²    Pain Scale: The patient's current pain level was assessed.  They report currently having a pain of 0 out of 10.    Other Pertinent Findings:     Toxicity Assessment          3/8/2024    10:29 3/11/2024    10:07 3/18/2024    09:22 3/25/2024    09:32 3/29/2024    10:39   Toxicity Assessment   Treatment Site Pelvis - male Pelvis - male Pelvis - male Pelvis - male Pelvis - male   Anorexia Grade 0 Grade 0 Grade 0 Grade 0 Grade 0   Anxiety Grade 0 Grade 0 Grade 0 Grade 0 Grade 0   Dehydration Grade 0 Grade 0 Grade 0 Grade 0 Grade 0   Depression Grade 0 Grade 0 Grade 0 Grade 0 Grade 0   Dermatitis Radiation Grade 0 Grade 0 Grade 0 Grade 0 Grade 0   Diarrhea Grade 0 Grade 0 Grade 0 Grade 0 Grade 0   Fatigue Grade 0  Grade 1 Grade 0 Grade 0 Grade 0   Fibrosis Deep Connective Tissue Grade 0 Grade 0 Grade 0 Grade 0 Grade 0   Fracture Grade 0 Grade 0 Grade 0 Grade 0 Grade 0   Nausea Grade 0 Grade 0 Grade 0 Grade 0 Grade 0   Pain Grade 0 Grade 0 Grade 0 Grade 0 Grade 0   Treatment Related Secondary Malignancy Grade 0 Grade 0 Grade 0 Grade 0 Grade 0   Tumor Pain Grade 0 Grade 0 Grade 0 Grade 0 Grade 0   Vomiting Grade 0 Grade 0 Grade 0 Grade 0 Grade 0   Abdominal Pain Grade 0 Grade 0 Grade 0 Grade 0 Grade 0   Bloating Grade 0 Grade 0 Grade 0 Grade 0 Grade 0   Constipation Grade 0 Grade 0 Grade 0 Grade 0 Grade 0   Dysphagia Grade 0 Grade 0 Grade 0 Grade 0 Grade 0   Enterovesical Fistula Grade 0 Grade 0 Grade 0 Grade 0 Grade 0   Fecal Incontinence Grade 0 Grade 0 Grade 0 Grade 0 Grade 0   Lower Gastrointestinal Hemorrhage Grade 0 Grade 0 Grade 0 Grade 0 Grade 0   Mucositis Oral Grade 0 Grade 0 Grade 0 Grade 0 Grade 0   Proctitis Grade 0 Grade 0 Grade 0 Grade 0 Grade 0   Rectal Hemorrhage Grade 0 Grade 0 Grade 0 Grade 0 Grade 0   Rectal Pain Grade 0 Grade 0 Grade 0 Grade 0 Grade 0   Rectal Ulcer Grade 0 Grade 0 Grade 0 Grade 0 Grade 0   Small Intestinal Obstruction Grade 0 Grade 0 Grade 0 Grade 0 Grade 0   Cystitis Noninfective Grade 0 Grade 0 Grade 0 Grade 0 Grade 0   Hematuria Grade 0 Grade 0 Grade 0 Grade 0 Grade 0   Urinary Incontinence Grade 1 Grade 1 Grade 1 Grade 1 Grade 1   Erectile Dysfunction Grade 0   Grade 0    Dry Mouth Grade 0       Edema Limbs Grade 0 Grade 0 Grade 0 Grade 0 Grade 0   Gastric Fistula Grade 0 Grade 0 Grade 0 Grade 0 Grade 0   Rectal Mucositis Grade 0 Grade 0 Grade 0 Grade 0 Grade 0   Rectal Stenosis Grade 0 Grade 0 Grade 0 Grade 0 Grade 0   Bladder Spasm Grade 0 Grade 0 Grade 0 Grade 0 Grade 0   Urinary Frequency Grade 0 Grade 1 Grade 1 Grade 1 Grade 1   Urinary Retention Grade 0 Grade 0 Grade 0 Grade 0 Grade 1   Urinary Tract Obstruction Grade 0 Grade 0 Grade 0 Grade 0 Grade 0   Urinary Tract Pain Grade 0  Grade 0 Grade 0 Grade 0 Grade 0   Urinary Urgency Grade 0 Grade 1 Grade 1 Grade 1 Grade 1   Ejaculation Disorder Grade 0 Grade 0 Grade 0 Grade 0 Grade 0        Assessment / Plan:  The patient is tolerating radiation therapy as anticipated.  Continue per current treatment plan.

## 2024-04-10 NOTE — PROGRESS NOTES
Radiation Oncology Treatment Summary    Patient Name:  Jerald Sandoval  MRN:  [id number]  :  1950    Referring Provider: No ref. provider found  Primary Care Provider: Jimmie Rogers MD    Brief History: Jerald Sandoval is a 73 y.o. male with prostate ca UIR plan for definitive radiotherapy. He was followed w PSA screening and PSA sintia from 4s to 6s in . 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 PSA 6.4. 23 ml prostate. NCCN unfavorable intermediate risk because of GG3. IPSS 8. QOL 3. CHANTELLE ~20. We will treat prostate 60 Gy / 20 fx (SIB) and distal SVs 44 Gy / 20 fx. PTVs are CTV + 0.5 cm in all directions, except 0.3 cm posteriorly. All IMRT, daily CBCT. He will get 4-6 mos of ADT w Dr Mahmood. He declined spacer given his UTI from the biopsy. He declined NRG  010.    Radiation Treatment Summary:    Radiation Treatments       Active   No active radiation treatments to show.     Completed   Prostate/SV (Started on 3/4/2024)   Most recent fraction: 300 cGy given on 3/29/2024   Total given: 6,000 cGy / 6,000 cGy  (20 of 20 fractions)   Elapsed Days: 25   Technique: VMAT                     Please see the patient's [facility] chart for further details regarding the radiation plan, including beam energy.    Concurrent Chemotherapy:  Treatment Plans       No treatment plans exist          CTCAE Toxicity Overview:   Toxicity Assessment          3/8/2024    10:29 3/11/2024    10:07 3/18/2024    09:22 3/25/2024    09:32 3/29/2024    10:39   Toxicity Assessment   Treatment Site Pelvis - male Pelvis - male Pelvis - male Pelvis - male Pelvis - male   Anorexia Grade 0 Grade 0 Grade 0 Grade 0 Grade 0   Anxiety Grade 0 Grade 0 Grade 0 Grade 0 Grade 0   Dehydration Grade 0 Grade 0 Grade 0 Grade 0 Grade 0   Depression Grade 0 Grade 0 Grade 0 Grade 0 Grade 0   Dermatitis Radiation Grade 0  Grade 0 Grade 0 Grade 0 Grade 0   Diarrhea Grade 0 Grade 0 Grade 0 Grade 0 Grade 0   Fatigue Grade 0 Grade 1 Grade 0 Grade 0 Grade 0   Fibrosis Deep Connective Tissue Grade 0 Grade 0 Grade 0 Grade 0 Grade 0   Fracture Grade 0 Grade 0 Grade 0 Grade 0 Grade 0   Nausea Grade 0 Grade 0 Grade 0 Grade 0 Grade 0   Pain Grade 0 Grade 0 Grade 0 Grade 0 Grade 0   Treatment Related Secondary Malignancy Grade 0 Grade 0 Grade 0 Grade 0 Grade 0   Tumor Pain Grade 0 Grade 0 Grade 0 Grade 0 Grade 0   Vomiting Grade 0 Grade 0 Grade 0 Grade 0 Grade 0   Abdominal Pain Grade 0 Grade 0 Grade 0 Grade 0 Grade 0   Bloating Grade 0 Grade 0 Grade 0 Grade 0 Grade 0   Constipation Grade 0 Grade 0 Grade 0 Grade 0 Grade 0   Dysphagia Grade 0 Grade 0 Grade 0 Grade 0 Grade 0   Enterovesical Fistula Grade 0 Grade 0 Grade 0 Grade 0 Grade 0   Fecal Incontinence Grade 0 Grade 0 Grade 0 Grade 0 Grade 0   Lower Gastrointestinal Hemorrhage Grade 0 Grade 0 Grade 0 Grade 0 Grade 0   Mucositis Oral Grade 0 Grade 0 Grade 0 Grade 0 Grade 0   Proctitis Grade 0 Grade 0 Grade 0 Grade 0 Grade 0   Rectal Hemorrhage Grade 0 Grade 0 Grade 0 Grade 0 Grade 0   Rectal Pain Grade 0 Grade 0 Grade 0 Grade 0 Grade 0   Rectal Ulcer Grade 0 Grade 0 Grade 0 Grade 0 Grade 0   Small Intestinal Obstruction Grade 0 Grade 0 Grade 0 Grade 0 Grade 0   Cystitis Noninfective Grade 0 Grade 0 Grade 0 Grade 0 Grade 0   Hematuria Grade 0 Grade 0 Grade 0 Grade 0 Grade 0   Urinary Incontinence Grade 1 Grade 1 Grade 1 Grade 1 Grade 1   Erectile Dysfunction Grade 0   Grade 0    Dry Mouth Grade 0       Edema Limbs Grade 0 Grade 0 Grade 0 Grade 0 Grade 0   Gastric Fistula Grade 0 Grade 0 Grade 0 Grade 0 Grade 0   Rectal Mucositis Grade 0 Grade 0 Grade 0 Grade 0 Grade 0   Rectal Stenosis Grade 0 Grade 0 Grade 0 Grade 0 Grade 0   Bladder Spasm Grade 0 Grade 0 Grade 0 Grade 0 Grade 0   Urinary Frequency Grade 0 Grade 1 Grade 1 Grade 1 Grade 1   Urinary Retention Grade 0 Grade 0 Grade 0 Grade 0  Grade 1   Urinary Tract Obstruction Grade 0 Grade 0 Grade 0 Grade 0 Grade 0   Urinary Tract Pain Grade 0 Grade 0 Grade 0 Grade 0 Grade 0   Urinary Urgency Grade 0 Grade 1 Grade 1 Grade 1 Grade 1   Ejaculation Disorder Grade 0 Grade 0 Grade 0 Grade 0 Grade 0     Patient Disposition: FU 8/7/24 @ [id number]

## 2024-04-16 ENCOUNTER — TELEMEDICINE (OUTPATIENT)
Dept: PRIMARY CARE | Facility: CLINIC | Age: 74
End: 2024-04-16
Payer: MEDICARE

## 2024-04-16 DIAGNOSIS — G63 POLYNEUROPATHY ASSOCIATED WITH UNDERLYING DISEASE (MULTI): ICD-10-CM

## 2024-04-16 PROCEDURE — 3048F LDL-C <100 MG/DL: CPT | Performed by: STUDENT IN AN ORGANIZED HEALTH CARE EDUCATION/TRAINING PROGRAM

## 2024-04-16 PROCEDURE — 1159F MED LIST DOCD IN RCRD: CPT | Performed by: STUDENT IN AN ORGANIZED HEALTH CARE EDUCATION/TRAINING PROGRAM

## 2024-04-16 PROCEDURE — 1036F TOBACCO NON-USER: CPT | Performed by: STUDENT IN AN ORGANIZED HEALTH CARE EDUCATION/TRAINING PROGRAM

## 2024-04-16 PROCEDURE — 99441 PR PHYS/QHP TELEPHONE EVALUATION 5-10 MIN: CPT | Performed by: STUDENT IN AN ORGANIZED HEALTH CARE EDUCATION/TRAINING PROGRAM

## 2024-04-16 PROCEDURE — 3044F HG A1C LEVEL LT 7.0%: CPT | Performed by: STUDENT IN AN ORGANIZED HEALTH CARE EDUCATION/TRAINING PROGRAM

## 2024-04-16 PROCEDURE — 1125F AMNT PAIN NOTED PAIN PRSNT: CPT | Performed by: STUDENT IN AN ORGANIZED HEALTH CARE EDUCATION/TRAINING PROGRAM

## 2024-04-16 PROCEDURE — 1157F ADVNC CARE PLAN IN RCRD: CPT | Performed by: STUDENT IN AN ORGANIZED HEALTH CARE EDUCATION/TRAINING PROGRAM

## 2024-04-16 PROCEDURE — 1160F RVW MEDS BY RX/DR IN RCRD: CPT | Performed by: STUDENT IN AN ORGANIZED HEALTH CARE EDUCATION/TRAINING PROGRAM

## 2024-04-16 RX ORDER — GABAPENTIN 300 MG/1
600 CAPSULE ORAL NIGHTLY
Qty: 60 CAPSULE | Refills: 1 | Status: SHIPPED | OUTPATIENT
Start: 2024-04-16 | End: 2024-04-30 | Stop reason: SDUPTHER

## 2024-04-16 ASSESSMENT — ENCOUNTER SYMPTOMS
CONSTITUTIONAL NEGATIVE: 1
RESPIRATORY NEGATIVE: 1
NUMBNESS: 1
CARDIOVASCULAR NEGATIVE: 1
GASTROINTESTINAL NEGATIVE: 1

## 2024-04-16 ASSESSMENT — PAIN SCALES - GENERAL: PAINLEVEL: 5

## 2024-04-16 NOTE — PROGRESS NOTES
[facility]: [facility]  TELEHEALTH ENCOUNTER      Jerald Sandoval is a 73 y.o. male that is presenting today for Follow-up.  This is a telehealth encounter with audio technology. Patient has consented to this type of visit. Duration of encounter: 5 minutes.    Assessment/Plan   Diagnoses and all orders for this visit:  Polyneuropathy associated with underlying disease (Multi)  -     Follow Up In Primary Care  -     gabapentin (Neurontin) 300 mg capsule; Take 2 capsules (600 mg) by mouth once daily at bedtime.    Subjective   - The patient otherwise feels well and denies any acute symptoms or concerns at this time.  - The patient denies any changes or progression of their chronic medical problems.  - The patient denies any problems or concerns with their medications.      Review of Systems   Constitutional: Negative.    Respiratory: Negative.     Cardiovascular: Negative.    Gastrointestinal: Negative.    Neurological:  Positive for numbness.   All other systems reviewed and are negative.     Objective   There were no vitals filed for this visit.  There is no height or weight on file to calculate BMI.  Physical Exam  Vitals (Patient-reported vitals.) reviewed.   Constitutional:       Comments: This is a virtual / telehealth encounter; unable to perform physical exam.       Diagnostic Results   Lab Results   Component Value Date    GLUCOSE 116 (H) 03/14/2024    CALCIUM 9.4 03/14/2024     03/14/2024    K 3.6 03/14/2024    CO2 29 03/14/2024     03/14/2024    BUN 12 03/14/2024    CREATININE 1.18 03/14/2024     Lab Results   Component Value Date    ALT 26 03/14/2024    AST 27 03/14/2024    ALKPHOS 80 03/14/2024    BILITOT 0.6 03/14/2024     Lab Results   Component Value Date    WBC 3.6 (L) 03/14/2024    HGB 13.4 (L) 03/14/2024    HCT 41.9 03/14/2024    MCV 86 03/14/2024     03/14/2024     Lab Results   Component Value Date    CHOL 131 03/14/2024    CHOL 158 09/14/2023    CHOL 146  "03/10/2023     Lab Results   Component Value Date    HDL 35.7 03/14/2024    HDL 41 09/14/2023    HDL 39.4 (A) 03/10/2023     Lab Results   Component Value Date    LDLCALC 59 03/14/2024    LDLCALC 84 09/14/2023    LDLCALC 77 09/06/2022     Lab Results   Component Value Date    TRIG 183 (H) 03/14/2024    TRIG 165 (H) 09/14/2023    TRIG 225 (H) 03/10/2023     No components found for: \"CHOLHDL\"  Lab Results   Component Value Date    HGBA1C 6.1 (H) 03/14/2024     Other labs not included in the list above were reviewed either before or during this encounter.    History   Past Medical History:   Diagnosis Date    Diabetes mellitus (Multi)     \"pre-diabetic\" per pt    Gout     History of stomach ulcers     d/t daily aleve per pt    History of transcatheter aortic valve replacement (TAVR)     Hyperlipemia     Hypertension     Hypothyroidism     Obstructive sleep apnea treated with continuous positive airway pressure (CPAP)     Sleep apnea      Past Surgical History:   Procedure Laterality Date    AORTIC VALVE REPLACEMENT N/A     CARPAL TUNNEL RELEASE Bilateral     ROTATOR CUFF REPAIR Bilateral     THUMB AMPUTATION Left     d/t trauma     Family History   Problem Relation Name Age of Onset    Heart disease Mother      Lung cancer Father      Heart disease Father      Other (Other) Father          CAB x5    Lung cancer Brother      Heart attack Son      Heart attack Son       Social History     Socioeconomic History    Marital status:      Spouse name: Not on file    Number of children: Not on file    Years of education: Not on file    Highest education level: Not on file   Occupational History    Not on file   Tobacco Use    Smoking status: Never     Passive exposure: Never    Smokeless tobacco: Never   Vaping Use    Vaping status: Never Used   Substance and Sexual Activity    Alcohol use: Yes     Alcohol/week: 3.0 standard drinks of alcohol     Types: 1 Cans of beer, 2 Shots of liquor per week    Drug use: Never    " "Sexual activity: Defer   Other Topics Concern    Not on file   Social History Narrative    Not on file     Social Determinants of Health     Financial Resource Strain: Low Risk  (3/11/2024)    Overall Financial Resource Strain (CARDIA)     Difficulty of Paying Living Expenses: Not hard at all   Food Insecurity: Unknown (3/11/2024)    Hunger Vital Sign     Worried About Running Out of Food in the Last Year: Never true     Ran Out of Food in the Last Year: Not on file   Transportation Needs: No Transportation Needs (3/11/2024)    PRAPARE - Transportation     Lack of Transportation (Medical): No     Lack of Transportation (Non-Medical): No   Physical Activity: Not on file   Stress: No Stress Concern Present (3/11/2024)    [employer] - Occupational Stress Questionnaire     Feeling of Stress : Not at all   Social Connections: Not on file   Intimate Partner Violence: Not on file   Housing Stability: Unknown (3/11/2024)    Housing Stability Vital Sign     Unable to Pay for Housing in the Last Year: No     Number of Places Lived in the Last Year: Not on file     Unstable Housing in the Last Year: No     Allergies   Allergen Reactions    Cephalosporins Rash     \"Skin peels off\"    Penicillin Rash     \"Skin peels off\"     Current Outpatient Medications on File Prior to Visit   Medication Sig Dispense Refill    acetaminophen (Tylenol) 500 mg tablet Take 2 tablets (1,000 mg) by mouth 3 times a day as needed for mild pain (1 - 3), moderate pain (4 - 6) or fever (temp greater than 38.0 C).      allopurinol (Zyloprim) 300 mg tablet Take 1 tablet (300 mg) by mouth once daily. 90 tablet 3    aspirin 81 mg chewable tablet Chew 1 tablet (81 mg) once daily.      azithromycin (Zithromax) 500 mg tablet Take 1 tablet (500 mg) by mouth once daily as needed (30-60 minutes prior to dental procedure).      diclofenac sodium (Voltaren) 1 % gel Apply 4.5 inches (4 g) topically 2 times a day as needed (pain).      " hydroCHLOROthiazide (Microzide) 12.5 mg tablet Take 1 tablet (12.5 mg) by mouth once daily. 90 tablet 3    levothyroxine (Synthroid, Levoxyl) 150 mcg tablet Take 1 tablet (150 mcg) by mouth once daily in the morning. Take before meals. 90 tablet 3    metFORMIN  mg 24 hr tablet Take 1 tablet (500 mg) by mouth once daily with breakfast. 90 tablet 3    NIFEdipine ER (NIFEdipine CC) 60 mg 24 hr tablet Take 1 tablet (60 mg) by mouth once daily with breakfast. 90 tablet 3    omeprazole (PriLOSEC) 40 mg DR capsule Take 1 capsule (40 mg) by mouth every other day. 45 capsule 3    simvastatin (Zocor) 10 mg tablet Take 1 tablet (10 mg) by mouth once daily. 90 tablet 3    [DISCONTINUED] gabapentin (Neurontin) 100 mg capsule Take 1 capsule (100 mg) by mouth once daily at bedtime. 30 capsule 1     No current facility-administered medications on file prior to visit.     Immunization History   Administered Date(s) Administered    Flu vaccine, quadrivalent, high-dose, preservative free, age 65y+ (FLUZONE) 10/24/2021, 09/13/2023    Hepatitis B vaccine, pediatric/adolescent (RECOMBIVAX, ENGERIX) 01/01/1988    Influenza, High Dose Seasonal, Preservative Free 10/05/2015, 11/29/2016, 09/07/2017, 09/14/2018, 10/01/2019    Influenza, Seasonal, Quadrivalent, Adjuvanted 10/05/2020, 09/27/2022    Influenza, seasonal, injectable 12/17/2012, 10/16/2013, 10/05/2015    Influenza, seasonal, injectable, preservative free 12/23/2013    Pfizer Purple Cap SARS-CoV-2 03/11/2021, 04/08/2021, 12/08/2021    Pneumococcal conjugate vaccine, 13-valent (PREVNAR 13) 11/29/2016, 07/05/2018    Td vaccine, age 7 years and older (TDVAX) 10/01/2008    Zoster vaccine, recombinant, adult (SHINGRIX) 09/18/2023, 11/18/2023    Zoster, live 02/17/2017     Patient's medical history was reviewed and updated either before or during this encounter.       Jimmie Rogers MD

## 2024-04-30 ENCOUNTER — TELEMEDICINE (OUTPATIENT)
Dept: PRIMARY CARE | Facility: CLINIC | Age: 74
End: 2024-04-30
Payer: MEDICARE

## 2024-04-30 DIAGNOSIS — G63 POLYNEUROPATHY ASSOCIATED WITH UNDERLYING DISEASE (MULTI): Primary | ICD-10-CM

## 2024-04-30 PROCEDURE — 1159F MED LIST DOCD IN RCRD: CPT | Performed by: STUDENT IN AN ORGANIZED HEALTH CARE EDUCATION/TRAINING PROGRAM

## 2024-04-30 PROCEDURE — 99441 PR PHYS/QHP TELEPHONE EVALUATION 5-10 MIN: CPT | Performed by: STUDENT IN AN ORGANIZED HEALTH CARE EDUCATION/TRAINING PROGRAM

## 2024-04-30 PROCEDURE — 3048F LDL-C <100 MG/DL: CPT | Performed by: STUDENT IN AN ORGANIZED HEALTH CARE EDUCATION/TRAINING PROGRAM

## 2024-04-30 PROCEDURE — 3044F HG A1C LEVEL LT 7.0%: CPT | Performed by: STUDENT IN AN ORGANIZED HEALTH CARE EDUCATION/TRAINING PROGRAM

## 2024-04-30 PROCEDURE — 1157F ADVNC CARE PLAN IN RCRD: CPT | Performed by: STUDENT IN AN ORGANIZED HEALTH CARE EDUCATION/TRAINING PROGRAM

## 2024-04-30 PROCEDURE — 1160F RVW MEDS BY RX/DR IN RCRD: CPT | Performed by: STUDENT IN AN ORGANIZED HEALTH CARE EDUCATION/TRAINING PROGRAM

## 2024-04-30 RX ORDER — GABAPENTIN 300 MG/1
300 CAPSULE ORAL NIGHTLY
Start: 2024-04-30 | End: 2024-05-14 | Stop reason: SDUPTHER

## 2024-04-30 ASSESSMENT — ENCOUNTER SYMPTOMS
CARDIOVASCULAR NEGATIVE: 1
CONSTITUTIONAL NEGATIVE: 1
RESPIRATORY NEGATIVE: 1
GASTROINTESTINAL NEGATIVE: 1

## 2024-04-30 NOTE — PROGRESS NOTES
[facility]: [facility]  TELEHEALTH ENCOUNTER      Jerald Sandoval is a 73 y.o. male that is presenting today for Follow-up (2 week).  This is a telehealth encounter with audio technology. Patient has consented to this type of visit. Duration of encounter: 5 minutes.    Assessment/Plan   There are no diagnoses linked to this encounter.  - Patient feels that his neuropathy has gotten worse since increasing the dosing on the gabapentin. Discussed with the patient at length that I think his neuropathy is just progressing rather than the medication being responsible for the current symptoms; however, the patient is requesting to decrease the dosing of the gabapentin. Will back down to one tablet again and reassess in a couple of weeks. Patient agreeable to the plan.    Subjective   - The patient otherwise feels well and denies any acute symptoms or concerns at this time.  - The patient denies any changes or progression of their chronic medical problems.  - The patient denies any problems or concerns with their medications.      Review of Systems   Constitutional: Negative.    Respiratory: Negative.     Cardiovascular: Negative.    Gastrointestinal: Negative.    All other systems reviewed and are negative.     Objective   There were no vitals filed for this visit.  There is no height or weight on file to calculate BMI.  Physical Exam  Vitals (Patient-reported vitals.) reviewed.   Constitutional:       Comments: This is a virtual / telehealth encounter; unable to perform physical exam.       Diagnostic Results   Lab Results   Component Value Date    GLUCOSE 116 (H) 03/14/2024    CALCIUM 9.4 03/14/2024     03/14/2024    K 3.6 03/14/2024    CO2 29 03/14/2024     03/14/2024    BUN 12 03/14/2024    CREATININE 1.18 03/14/2024     Lab Results   Component Value Date    ALT 26 03/14/2024    AST 27 03/14/2024    ALKPHOS 80 03/14/2024    BILITOT 0.6 03/14/2024     Lab Results   Component Value Date  "   WBC 3.6 (L) 03/14/2024    HGB 13.4 (L) 03/14/2024    HCT 41.9 03/14/2024    MCV 86 03/14/2024     03/14/2024     Lab Results   Component Value Date    CHOL 131 03/14/2024    CHOL 158 09/14/2023    CHOL 146 03/10/2023     Lab Results   Component Value Date    HDL 35.7 03/14/2024    HDL 41 09/14/2023    HDL 39.4 (A) 03/10/2023     Lab Results   Component Value Date    LDLCALC 59 03/14/2024    LDLCALC 84 09/14/2023    LDLCALC 77 09/06/2022     Lab Results   Component Value Date    TRIG 183 (H) 03/14/2024    TRIG 165 (H) 09/14/2023    TRIG 225 (H) 03/10/2023     No components found for: \"CHOLHDL\"  Lab Results   Component Value Date    HGBA1C 6.1 (H) 03/14/2024     Other labs not included in the list above were reviewed either before or during this encounter.    History   Past Medical History:   Diagnosis Date    Diabetes mellitus (Multi)     \"pre-diabetic\" per pt    Gout     History of stomach ulcers     d/t daily aleve per pt    History of transcatheter aortic valve replacement (TAVR)     Hyperlipemia     Hypertension     Hypothyroidism     Obstructive sleep apnea treated with continuous positive airway pressure (CPAP)     Sleep apnea      Past Surgical History:   Procedure Laterality Date    AORTIC VALVE REPLACEMENT N/A     CARPAL TUNNEL RELEASE Bilateral     ROTATOR CUFF REPAIR Bilateral     THUMB AMPUTATION Left     d/t trauma     Family History   Problem Relation Name Age of Onset    Heart disease Mother      Lung cancer Father      Heart disease Father      Other (Other) Father          CAB x5    Lung cancer Brother      Heart attack Son      Heart attack Son       Social History     Socioeconomic History    Marital status:      Spouse name: Not on file    Number of children: Not on file    Years of education: Not on file    Highest education level: Not on file   Occupational History    Not on file   Tobacco Use    Smoking status: Never     Passive exposure: Never    Smokeless tobacco: Never " "  Vaping Use    Vaping status: Never Used   Substance and Sexual Activity    Alcohol use: Yes     Alcohol/week: 3.0 standard drinks of alcohol     Types: 1 Cans of beer, 2 Shots of liquor per week    Drug use: Never    Sexual activity: Defer   Other Topics Concern    Not on file   Social History Narrative    Not on file     Social Determinants of Health     Financial Resource Strain: Low Risk  (3/11/2024)    Overall Financial Resource Strain (CARDIA)     Difficulty of Paying Living Expenses: Not hard at all   Food Insecurity: Unknown (3/11/2024)    Hunger Vital Sign     Worried About Running Out of Food in the Last Year: Never true     Ran Out of Food in the Last Year: Not on file   Transportation Needs: No Transportation Needs (3/11/2024)    PRAPARE - Transportation     Lack of Transportation (Medical): No     Lack of Transportation (Non-Medical): No   Physical Activity: Not on file   Stress: No Stress Concern Present (3/11/2024)    [employer] - Occupational Stress Questionnaire     Feeling of Stress : Not at all   Social Connections: Not on file   Intimate Partner Violence: Not on file   Housing Stability: Unknown (3/11/2024)    Housing Stability Vital Sign     Unable to Pay for Housing in the Last Year: No     Number of Places Lived in the Last Year: Not on file     Unstable Housing in the Last Year: No     Allergies   Allergen Reactions    Cephalosporins Rash     \"Skin peels off\"    Penicillin Rash     \"Skin peels off\"     Current Outpatient Medications on File Prior to Visit   Medication Sig Dispense Refill    acetaminophen (Tylenol) 500 mg tablet Take 2 tablets (1,000 mg) by mouth 3 times a day as needed for mild pain (1 - 3), moderate pain (4 - 6) or fever (temp greater than 38.0 C).      allopurinol (Zyloprim) 300 mg tablet Take 1 tablet (300 mg) by mouth once daily. 90 tablet 3    aspirin 81 mg chewable tablet Chew 1 tablet (81 mg) once daily.      diclofenac sodium (Voltaren) " 1 % gel Apply 4.5 inches (4 g) topically 2 times a day as needed (pain).      gabapentin (Neurontin) 300 mg capsule Take 2 capsules (600 mg) by mouth once daily at bedtime. 60 capsule 1    hydroCHLOROthiazide (Microzide) 12.5 mg tablet Take 1 tablet (12.5 mg) by mouth once daily. 90 tablet 3    levothyroxine (Synthroid, Levoxyl) 150 mcg tablet Take 1 tablet (150 mcg) by mouth once daily in the morning. Take before meals. 90 tablet 3    metFORMIN  mg 24 hr tablet Take 1 tablet (500 mg) by mouth once daily with breakfast. 90 tablet 3    NIFEdipine ER (NIFEdipine CC) 60 mg 24 hr tablet Take 1 tablet (60 mg) by mouth once daily with breakfast. 90 tablet 3    omeprazole (PriLOSEC) 40 mg DR capsule Take 1 capsule (40 mg) by mouth every other day. 45 capsule 3    simvastatin (Zocor) 10 mg tablet Take 1 tablet (10 mg) by mouth once daily. 90 tablet 3    azithromycin (Zithromax) 500 mg tablet Take 1 tablet (500 mg) by mouth once daily as needed (30-60 minutes prior to dental procedure). (Patient not taking: Reported on 4/30/2024)       No current facility-administered medications on file prior to visit.     Immunization History   Administered Date(s) Administered    Flu vaccine, quadrivalent, high-dose, preservative free, age 65y+ (FLUZONE) 10/24/2021, 09/13/2023    Hepatitis B vaccine, pediatric/adolescent (RECOMBIVAX, ENGERIX) 01/01/1988    Influenza, High Dose Seasonal, Preservative Free 10/05/2015, 11/29/2016, 09/07/2017, 09/14/2018, 10/01/2019    Influenza, Seasonal, Quadrivalent, Adjuvanted 10/05/2020, 09/27/2022    Influenza, seasonal, injectable 12/17/2012, 10/16/2013, 10/05/2015    Influenza, seasonal, injectable, preservative free 12/23/2013    Pfizer Purple Cap SARS-CoV-2 03/11/2021, 04/08/2021, 12/08/2021    Pneumococcal conjugate vaccine, 13-valent (PREVNAR 13) 11/29/2016, 07/05/2018    Td vaccine, age 7 years and older (TDVAX) 10/01/2008    Zoster vaccine, recombinant, adult (SHINGRIX) 09/18/2023,  11/18/2023    Zoster, live 02/17/2017     Patient's medical history was reviewed and updated either before or during this encounter.       Jimmie Rogers MD

## 2024-05-11 NOTE — PROGRESS NOTES
Subjective      Chief Complaint   Patient presents with    Follow-up          He had aortic valve replacement by TAVR in February 2023  Is feeling well and had the echo in Feb and the gradient is low.  He is not complaining of chest discomfort.  NO PND or orthopnea.  The legs are  swelling on him he uses compression socks for it.  He does not complain of palpitations.           ROS     Past Surgical History:   Procedure Laterality Date    AORTIC VALVE REPLACEMENT N/A     CARPAL TUNNEL RELEASE Bilateral     ROTATOR CUFF REPAIR Bilateral     THUMB AMPUTATION Left     d/t trauma        Active Ambulatory Problems     Diagnosis Date Noted    BPH (benign prostatic hyperplasia) 08/21/2023    Type 2 diabetes mellitus (Multi) 08/21/2023    HTN (hypertension) 08/21/2023    GERD (gastroesophageal reflux disease) 08/21/2023    History of gout 08/21/2023    History of colonic polyps 08/21/2023    Hypothyroidism 08/21/2023    Macular degeneration 08/21/2023    HLD (hyperlipidemia) 08/21/2023    Morbid obesity (Multi) 08/21/2023    Peripheral neuropathy 08/21/2023    OA (osteoarthritis) 08/21/2023    Valvular heart disease 08/21/2023    History of transcatheter aortic valve replacement (TAVR) 11/27/2023    PUD (peptic ulcer disease) 12/29/2023    CAD (coronary artery disease) 01/10/2023    Malignant neoplasm of prostate (Multi) 02/09/2024    History of malignant neoplasm of skin 02/09/2024    Vitamin D deficiency 02/09/2024    TODD (obstructive sleep apnea) 01/10/2023    LBBB (left bundle branch block) 02/18/2023    History of kidney stones 03/21/2024     Resolved Ambulatory Problems     Diagnosis Date Noted    Aortic stenosis 08/21/2023    Arthritis 08/21/2023    Dysphagia 08/21/2023    Hyperglycemia due to type 2 diabetes mellitus (Multi) 08/21/2023    Allergy status to other drugs, medicaments and biological substances 01/10/2023    Body mass index (BMI) 45.0-49.9, adult (Multi) 02/18/2023    Arthritis of left knee 02/09/2024     Unspecified tear of unspecified meniscus, current injury, left knee, initial encounter 02/09/2024    High prostate specific antigen (PSA) 10/16/2023    Fatigue 02/09/2024    Dizziness and giddiness 02/09/2024    Primary hypertension 02/12/2024    Pain in left knee 06/29/2023    Other postprocedural cardiac functional disturbances following cardiac surgery 02/18/2023    Other specified diseases of pancreas (HHS-HCC) 02/08/2023    Other diseases of spleen 02/08/2023    Long term (current) use of oral hypoglycemic drugs 01/10/2023    Morbid (severe) obesity due to excess calories (Multi) 02/18/2023    Long term (current) use of aspirin 01/10/2023    Calculus of kidney 02/08/2023     Past Medical History:   Diagnosis Date    Coronary artery disease     Diabetes mellitus (Multi)     Gout     History of stomach ulcers     Hyperlipemia     Hypertension     Obstructive sleep apnea treated with continuous positive airway pressure (CPAP)     Prostate cancer (Multi)     Sleep apnea         Visit Vitals  /70   Pulse 64   Wt 133 kg (293 lb)   SpO2 98%   BMI 42.66 kg/m²   Smoking Status Never   BSA 2.55 m²        Objective     Constitutional:       Appearance: Healthy appearance.   Eyes:      Pupils: Pupils are equal, round, and reactive to light.   Neck:      Vascular: No JVR. JVD normal.   Pulmonary:      Effort: Pulmonary effort is normal.      Breath sounds: Normal breath sounds.   Cardiovascular:      PMI at left midclavicular line. Normal rate. Regular rhythm. Normal S1. Normal S2.       Murmurs: There is a grade 1/6 early systolic murmur.      No gallop.  No click. No rub.   Pulses:     Intact distal pulses.   Edema:     Peripheral edema absent.   Abdominal:      Palpations: Abdomen is soft.      Tenderness: There is no abdominal tenderness.   Musculoskeletal: Normal range of motion.      Extremities: No clubbing present.Skin:     General: Skin is warm and dry.   Neurological:      General: No focal deficit present.  "           Lab Review:         Lab Results   Component Value Date    CHOL 131 03/14/2024    CHOL 158 09/14/2023    CHOL 146 03/10/2023     Lab Results   Component Value Date    HDL 35.7 03/14/2024    HDL 41 09/14/2023    HDL 39.4 (A) 03/10/2023     Lab Results   Component Value Date    LDLCALC 59 03/14/2024    LDLCALC 84 09/14/2023    LDLCALC 77 09/06/2022     Lab Results   Component Value Date    TRIG 183 (H) 03/14/2024    TRIG 165 (H) 09/14/2023    TRIG 225 (H) 03/10/2023     No components found for: \"CHOLHDL\"     Assessment/Plan     Valvular heart disease  Is doing well and is active.  The TAVR was 1 year ago and is doing well.    CAD (coronary artery disease)  No angina and no chf.     "

## 2024-05-13 ENCOUNTER — OFFICE VISIT (OUTPATIENT)
Dept: CARDIOLOGY | Facility: CLINIC | Age: 74
End: 2024-05-13
Payer: MEDICARE

## 2024-05-13 VITALS
SYSTOLIC BLOOD PRESSURE: 116 MMHG | BODY MASS INDEX: 42.66 KG/M2 | OXYGEN SATURATION: 98 % | DIASTOLIC BLOOD PRESSURE: 70 MMHG | HEART RATE: 64 BPM | WEIGHT: 293 LBS

## 2024-05-13 DIAGNOSIS — I38 VALVULAR HEART DISEASE: Primary | ICD-10-CM

## 2024-05-13 DIAGNOSIS — I25.10 CORONARY ARTERY DISEASE INVOLVING NATIVE CORONARY ARTERY OF NATIVE HEART WITHOUT ANGINA PECTORIS: ICD-10-CM

## 2024-05-13 PROCEDURE — 99213 OFFICE O/P EST LOW 20 MIN: CPT | Performed by: INTERNAL MEDICINE

## 2024-05-13 PROCEDURE — 3044F HG A1C LEVEL LT 7.0%: CPT | Performed by: INTERNAL MEDICINE

## 2024-05-13 PROCEDURE — 1126F AMNT PAIN NOTED NONE PRSNT: CPT | Performed by: INTERNAL MEDICINE

## 2024-05-13 PROCEDURE — 1159F MED LIST DOCD IN RCRD: CPT | Performed by: INTERNAL MEDICINE

## 2024-05-13 PROCEDURE — 3078F DIAST BP <80 MM HG: CPT | Performed by: INTERNAL MEDICINE

## 2024-05-13 PROCEDURE — 1160F RVW MEDS BY RX/DR IN RCRD: CPT | Performed by: INTERNAL MEDICINE

## 2024-05-13 PROCEDURE — 1157F ADVNC CARE PLAN IN RCRD: CPT | Performed by: INTERNAL MEDICINE

## 2024-05-13 PROCEDURE — 3048F LDL-C <100 MG/DL: CPT | Performed by: INTERNAL MEDICINE

## 2024-05-13 PROCEDURE — 3074F SYST BP LT 130 MM HG: CPT | Performed by: INTERNAL MEDICINE

## 2024-05-13 PROCEDURE — 1036F TOBACCO NON-USER: CPT | Performed by: INTERNAL MEDICINE

## 2024-05-13 ASSESSMENT — PAIN SCALES - GENERAL: PAINLEVEL: 0-NO PAIN

## 2024-05-14 ENCOUNTER — TELEMEDICINE (OUTPATIENT)
Dept: PRIMARY CARE | Facility: CLINIC | Age: 74
End: 2024-05-14
Payer: MEDICARE

## 2024-05-14 DIAGNOSIS — G63 POLYNEUROPATHY ASSOCIATED WITH UNDERLYING DISEASE (MULTI): Primary | ICD-10-CM

## 2024-05-14 PROCEDURE — 3044F HG A1C LEVEL LT 7.0%: CPT | Performed by: STUDENT IN AN ORGANIZED HEALTH CARE EDUCATION/TRAINING PROGRAM

## 2024-05-14 PROCEDURE — 1036F TOBACCO NON-USER: CPT | Performed by: STUDENT IN AN ORGANIZED HEALTH CARE EDUCATION/TRAINING PROGRAM

## 2024-05-14 PROCEDURE — 1160F RVW MEDS BY RX/DR IN RCRD: CPT | Performed by: STUDENT IN AN ORGANIZED HEALTH CARE EDUCATION/TRAINING PROGRAM

## 2024-05-14 PROCEDURE — 99441 PR PHYS/QHP TELEPHONE EVALUATION 5-10 MIN: CPT | Performed by: STUDENT IN AN ORGANIZED HEALTH CARE EDUCATION/TRAINING PROGRAM

## 2024-05-14 PROCEDURE — 1157F ADVNC CARE PLAN IN RCRD: CPT | Performed by: STUDENT IN AN ORGANIZED HEALTH CARE EDUCATION/TRAINING PROGRAM

## 2024-05-14 PROCEDURE — 1159F MED LIST DOCD IN RCRD: CPT | Performed by: STUDENT IN AN ORGANIZED HEALTH CARE EDUCATION/TRAINING PROGRAM

## 2024-05-14 PROCEDURE — 3048F LDL-C <100 MG/DL: CPT | Performed by: STUDENT IN AN ORGANIZED HEALTH CARE EDUCATION/TRAINING PROGRAM

## 2024-05-14 RX ORDER — GABAPENTIN 300 MG/1
300 CAPSULE ORAL NIGHTLY
Qty: 90 CAPSULE | Refills: 3 | Status: SHIPPED | OUTPATIENT
Start: 2024-05-14 | End: 2025-05-14

## 2024-05-14 ASSESSMENT — ENCOUNTER SYMPTOMS
GASTROINTESTINAL NEGATIVE: 1
CARDIOVASCULAR NEGATIVE: 1
NUMBNESS: 1
RESPIRATORY NEGATIVE: 1
CONSTITUTIONAL NEGATIVE: 1

## 2024-05-14 NOTE — PROGRESS NOTES
[facility]: [facility]  TELEHEALTH ENCOUNTER      Jerald Sandoval is a 73 y.o. male that is presenting today for No chief complaint on file..  This is a telehealth encounter with audio technology. Patient has consented to this type of visit. Duration of encounter: 5 minutes.    Assessment/Plan   Diagnoses and all orders for this visit:  Polyneuropathy associated with underlying disease (Multi)  -     gabapentin (Neurontin) 300 mg capsule; Take 1 capsule (300 mg) by mouth once daily at bedtime.    Subjective   - The patient otherwise feels well and denies any acute symptoms or concerns at this time.  - The patient denies any changes or progression of their chronic medical problems.  - The patient denies any problems or concerns with their medications.      Review of Systems   Constitutional: Negative.    Respiratory: Negative.     Cardiovascular: Negative.    Gastrointestinal: Negative.    Neurological:  Positive for numbness.   All other systems reviewed and are negative.     Objective   There were no vitals filed for this visit.  There is no height or weight on file to calculate BMI.  Physical Exam  Vitals (Patient-reported vitals.) reviewed.   Constitutional:       Comments: This is a virtual / telehealth encounter; unable to perform physical exam.       Diagnostic Results   Lab Results   Component Value Date    GLUCOSE 116 (H) 03/14/2024    CALCIUM 9.4 03/14/2024     03/14/2024    K 3.6 03/14/2024    CO2 29 03/14/2024     03/14/2024    BUN 12 03/14/2024    CREATININE 1.18 03/14/2024     Lab Results   Component Value Date    ALT 26 03/14/2024    AST 27 03/14/2024    ALKPHOS 80 03/14/2024    BILITOT 0.6 03/14/2024     Lab Results   Component Value Date    WBC 3.6 (L) 03/14/2024    HGB 13.4 (L) 03/14/2024    HCT 41.9 03/14/2024    MCV 86 03/14/2024     03/14/2024     Lab Results   Component Value Date    CHOL 131 03/14/2024    CHOL 158 09/14/2023    CHOL 146 03/10/2023  "    Lab Results   Component Value Date    HDL 35.7 03/14/2024    HDL 41 09/14/2023    HDL 39.4 (A) 03/10/2023     Lab Results   Component Value Date    LDLCALC 59 03/14/2024    LDLCALC 84 09/14/2023    LDLCALC 77 09/06/2022     Lab Results   Component Value Date    TRIG 183 (H) 03/14/2024    TRIG 165 (H) 09/14/2023    TRIG 225 (H) 03/10/2023     No components found for: \"CHOLHDL\"  Lab Results   Component Value Date    HGBA1C 6.1 (H) 03/14/2024     Other labs not included in the list above were reviewed either before or during this encounter.    History   Past Medical History:   Diagnosis Date    Coronary artery disease     Diabetes mellitus (Multi)     \"pre-diabetic\" per pt    Gout     History of stomach ulcers     d/t daily aleve per pt    History of transcatheter aortic valve replacement (TAVR)     History of transcatheter aortic valve replacement (TAVR)     Hyperlipemia     Hypertension     Hypothyroidism     LBBB (left bundle branch block)     Obstructive sleep apnea treated with continuous positive airway pressure (CPAP)     Prostate cancer (Multi)     Sleep apnea      Past Surgical History:   Procedure Laterality Date    AORTIC VALVE REPLACEMENT N/A     CARPAL TUNNEL RELEASE Bilateral     ROTATOR CUFF REPAIR Bilateral     THUMB AMPUTATION Left     d/t trauma     Family History   Problem Relation Name Age of Onset    Heart disease Mother      Lung cancer Father      Heart disease Father      Other (Other) Father          CAB x5    Lung cancer Brother      Heart attack Son      Heart attack Son       Social History     Socioeconomic History    Marital status:      Spouse name: Not on file    Number of children: Not on file    Years of education: Not on file    Highest education level: Not on file   Occupational History    Not on file   Tobacco Use    Smoking status: Never     Passive exposure: Never    Smokeless tobacco: Never   Vaping Use    Vaping status: Never Used   Substance and Sexual Activity    " "Alcohol use: Yes     Alcohol/week: 3.0 standard drinks of alcohol     Types: 1 Cans of beer, 2 Shots of liquor per week    Drug use: Never    Sexual activity: Defer   Other Topics Concern    Not on file   Social History Narrative    Not on file     Social Determinants of Health     Financial Resource Strain: Low Risk  (3/11/2024)    Overall Financial Resource Strain (CARDIA)     Difficulty of Paying Living Expenses: Not hard at all   Food Insecurity: Unknown (3/11/2024)    Hunger Vital Sign     Worried About Running Out of Food in the Last Year: Never true     Ran Out of Food in the Last Year: Not on file   Transportation Needs: No Transportation Needs (3/11/2024)    PRAPARE - Transportation     Lack of Transportation (Medical): No     Lack of Transportation (Non-Medical): No   Physical Activity: Not on file   Stress: No Stress Concern Present (3/11/2024)    [employer] [place] [employer] - Occupational Stress Questionnaire     Feeling of Stress : Not at all   Social Connections: Not on file   Intimate Partner Violence: Not on file   Housing Stability: Unknown (3/11/2024)    Housing Stability Vital Sign     Unable to Pay for Housing in the Last Year: No     Number of Places Lived in the Last Year: Not on file     Unstable Housing in the Last Year: No     Allergies   Allergen Reactions    Cephalosporins Rash     \"Skin peels off\"    Penicillin Rash     \"Skin peels off\"     Current Outpatient Medications on File Prior to Visit   Medication Sig Dispense Refill    acetaminophen (Tylenol) 500 mg tablet Take 2 tablets (1,000 mg) by mouth 3 times a day as needed for mild pain (1 - 3), moderate pain (4 - 6) or fever (temp greater than 38.0 C).      allopurinol (Zyloprim) 300 mg tablet Take 1 tablet (300 mg) by mouth once daily. 90 tablet 3    aspirin 81 mg chewable tablet Chew 1 tablet (81 mg) once daily.      diclofenac sodium (Voltaren) 1 % gel Apply 4.5 inches (4 g) topically 2 times a day as needed (pain).      " gabapentin (Neurontin) 300 mg capsule Take 1 capsule (300 mg) by mouth once daily at bedtime.      hydroCHLOROthiazide (Microzide) 12.5 mg tablet Take 1 tablet (12.5 mg) by mouth once daily. 90 tablet 3    levothyroxine (Synthroid, Levoxyl) 150 mcg tablet Take 1 tablet (150 mcg) by mouth once daily in the morning. Take before meals. 90 tablet 3    metFORMIN  mg 24 hr tablet Take 1 tablet (500 mg) by mouth once daily with breakfast. 90 tablet 3    NIFEdipine ER (NIFEdipine CC) 60 mg 24 hr tablet Take 1 tablet (60 mg) by mouth once daily with breakfast. 90 tablet 3    omeprazole (PriLOSEC) 40 mg DR capsule Take 1 capsule (40 mg) by mouth every other day. 45 capsule 3    simvastatin (Zocor) 10 mg tablet Take 1 tablet (10 mg) by mouth once daily. 90 tablet 3     No current facility-administered medications on file prior to visit.     Immunization History   Administered Date(s) Administered    Flu vaccine, quadrivalent, high-dose, preservative free, age 65y+ (FLUZONE) 10/24/2021, 09/13/2023    Hepatitis B vaccine, pediatric/adolescent (RECOMBIVAX, ENGERIX) 01/01/1988    Influenza, High Dose Seasonal, Preservative Free 10/05/2015, 11/29/2016, 09/07/2017, 09/14/2018, 10/01/2019    Influenza, Seasonal, Quadrivalent, Adjuvanted 10/05/2020, 09/27/2022    Influenza, seasonal, injectable 12/17/2012, 10/16/2013, 10/05/2015    Influenza, seasonal, injectable, preservative free 12/23/2013    Pfizer Purple Cap SARS-CoV-2 03/11/2021, 04/08/2021, 12/08/2021    Pneumococcal conjugate vaccine, 13-valent (PREVNAR 13) 11/29/2016, 07/05/2018    Td vaccine, age 7 years and older (TDVAX) 10/01/2008    Zoster vaccine, recombinant, adult (SHINGRIX) 09/18/2023, 11/18/2023    Zoster, live 02/17/2017     Patient's medical history was reviewed and updated either before or during this encounter.       Jimmie Rogers MD

## 2024-05-21 ENCOUNTER — HOSPITAL ENCOUNTER (OUTPATIENT)
Dept: RADIOLOGY | Facility: HOSPITAL | Age: 74
Discharge: HOME | End: 2024-05-21
Payer: MEDICARE

## 2024-05-21 DIAGNOSIS — S83.272A COMPLEX TEAR OF LATERAL MENISCUS, CURRENT INJURY, LEFT KNEE, INITIAL ENCOUNTER: ICD-10-CM

## 2024-05-21 PROCEDURE — 73721 MRI JNT OF LWR EXTRE W/O DYE: CPT | Mod: LT

## 2024-05-21 PROCEDURE — 73721 MRI JNT OF LWR EXTRE W/O DYE: CPT | Mod: LEFT SIDE | Performed by: RADIOLOGY

## 2024-05-22 ENCOUNTER — OFFICE VISIT (OUTPATIENT)
Dept: NEUROLOGY | Facility: CLINIC | Age: 74
End: 2024-05-22
Payer: MEDICARE

## 2024-05-22 VITALS
DIASTOLIC BLOOD PRESSURE: 76 MMHG | HEIGHT: 70 IN | SYSTOLIC BLOOD PRESSURE: 132 MMHG | HEART RATE: 68 BPM | WEIGHT: 290.3 LBS | BODY MASS INDEX: 41.56 KG/M2

## 2024-05-22 DIAGNOSIS — I67.9 SMALL VESSEL DISEASE, CEREBROVASCULAR: Primary | ICD-10-CM

## 2024-05-22 PROCEDURE — 1160F RVW MEDS BY RX/DR IN RCRD: CPT | Performed by: PSYCHIATRY & NEUROLOGY

## 2024-05-22 PROCEDURE — 1036F TOBACCO NON-USER: CPT | Performed by: PSYCHIATRY & NEUROLOGY

## 2024-05-22 PROCEDURE — 3075F SYST BP GE 130 - 139MM HG: CPT | Performed by: PSYCHIATRY & NEUROLOGY

## 2024-05-22 PROCEDURE — 99204 OFFICE O/P NEW MOD 45 MIN: CPT | Performed by: PSYCHIATRY & NEUROLOGY

## 2024-05-22 PROCEDURE — 1157F ADVNC CARE PLAN IN RCRD: CPT | Performed by: PSYCHIATRY & NEUROLOGY

## 2024-05-22 PROCEDURE — 3078F DIAST BP <80 MM HG: CPT | Performed by: PSYCHIATRY & NEUROLOGY

## 2024-05-22 PROCEDURE — 3048F LDL-C <100 MG/DL: CPT | Performed by: PSYCHIATRY & NEUROLOGY

## 2024-05-22 PROCEDURE — 1159F MED LIST DOCD IN RCRD: CPT | Performed by: PSYCHIATRY & NEUROLOGY

## 2024-05-22 PROCEDURE — 3044F HG A1C LEVEL LT 7.0%: CPT | Performed by: PSYCHIATRY & NEUROLOGY

## 2024-05-22 ASSESSMENT — ENCOUNTER SYMPTOMS
POLYDIPSIA: 0
WOUND: 0
DIFFICULTY URINATING: 0
AGITATION: 0
BACK PAIN: 0
SHORTNESS OF BREATH: 0
EYE DISCHARGE: 0
ARTHRALGIAS: 0
FEVER: 0
DIAPHORESIS: 0
DIARRHEA: 0
CHILLS: 0
ADENOPATHY: 0
COUGH: 0
FATIGUE: 0
CONSTIPATION: 0

## 2024-05-22 ASSESSMENT — VISUAL ACUITY: VA_NORMAL: 1

## 2024-05-22 NOTE — PROGRESS NOTES
"Consults  C C : Dizziness on waking up - one time in Jan of 2024  History Of Present Illness  Jerald Sandoval is a 73 y.o. male presenting with remote hx of one time episodic dizziness and then work up for stroke was performed and  he was discharged. He was later on dx with UTI by urology with UTI and dizziness got better.     ASA 81 mg q d    Past Medical and Surgical History    Past Medical History:   Diagnosis Date    Coronary artery disease     Diabetes mellitus (Multi)     \"pre-diabetic\" per pt    Gout     History of stomach ulcers     d/t daily aleve per pt    History of transcatheter aortic valve replacement (TAVR)     History of transcatheter aortic valve replacement (TAVR)     Hyperlipemia     Hypertension     Hypothyroidism     LBBB (left bundle branch block)     Obstructive sleep apnea treated with continuous positive airway pressure (CPAP)     Prostate cancer (Multi)     Sleep apnea           Past Surgical History:   Procedure Laterality Date    AORTIC VALVE REPLACEMENT N/A     CARPAL TUNNEL RELEASE Bilateral     ROTATOR CUFF REPAIR Bilateral     THUMB AMPUTATION Left     d/t trauma        Social History  Social History     Tobacco Use    Smoking status: Never     Passive exposure: Never    Smokeless tobacco: Never   Vaping Use    Vaping status: Never Used   Substance Use Topics    Alcohol use: Yes     Alcohol/week: 3.0 standard drinks of alcohol     Types: 1 Cans of beer, 2 Shots of liquor per week    Drug use: Never     Allergies  Cephalosporins and Penicillin  (Not in a hospital admission)      Review of Systems   Constitutional:  Negative for chills, diaphoresis, fatigue and fever.   HENT:  Negative for congestion.    Eyes:  Negative for discharge.   Respiratory:  Negative for cough and shortness of breath.    Cardiovascular:  Negative for chest pain.   Gastrointestinal:  Negative for constipation and diarrhea.   Endocrine: Negative for polydipsia.   Genitourinary:  Negative for difficulty urinating. "   Musculoskeletal:  Negative for arthralgias and back pain.   Skin:  Negative for pallor and wound.   Allergic/Immunologic: Negative for environmental allergies.   Hematological:  Negative for adenopathy.   Psychiatric/Behavioral:  Negative for agitation and behavioral problems.          Cardiovascular system: S1-S2 intact  Respiratory clear to auscultation bilateral on deep breathing he feels irritability on the left side of the chest.    Neurological Exam  Mental Status  Awake, alert and oriented to person, place and time. Recent and remote memory are intact. Able to copy figure. Clock drawing is normal. Speech is normal. Able to name objects, name parts of objects, repeat, read and write. Follows three-step commands. Able to perform serial calculations. Able to spell words backwards. Fund of knowledge is appropriate for level of education.    Cranial Nerves  CN II: Visual acuity is normal. Right funduscopic exam: disc intact. Left funduscopic exam: disc intact.  CN III, IV, VI: Extraocular movements intact bilaterally. Normal lids and orbits bilaterally.   Right pupil: Round. Reactive to light. Reactive to accommodation.  CN V:  Right: Facial sensation is normal.  Left: Facial sensation is normal on the left.  CN VII: Full and symmetric facial movement.  CN VIII:  Right: Hearing is normal.  Left: Hearing is normal.  CN IX, X:  Right: Palate is normal.  Left: Palate is normal.  CN XI:  Right: Sternocleidomastoid strength is normal.  Left: Sternocleidomastoid strength is normal.  CN XII: Tongue midline without atrophy or fasciculations.    Motor  Normal muscle bulk throughout. No fasciculations present. Normal muscle tone. No abnormal involuntary movements. Strength is 5/5 throughout all four extremities.    Sensory  Light touch is normal in upper and lower extremities. Pinprick is normal in upper and lower extremities. Temperature is normal in upper and lower extremities. Vibration is normal in upper and lower  "extremities.     Reflexes  Deep tendon reflexes are 2+ and symmetric in all four extremities.  Jaw jerk absent.  Right pathological reflexes: Reema's absent.  Left pathological reflexes: Reema's absent.  Glabellar tap absent. Snout absent. Right palmomental absent. Left palmomental absent. Right palmar grasp absent. Left palmar grasp absent.    Coordination  Right: Finger-to-nose normal. Rapid alternating movement normal. Heel-to-shin normal.Left: Finger-to-nose normal. Rapid alternating movement normal. Heel-to-shin normal.    Gait  Normal casual, toe, heel and tandem gait.        Last Recorded Vitals  Blood pressure 132/76, pulse 68, height 1.778 m (5' 10\"), weight 132 kg (290 lb 4.8 oz).    Relevant Results      Current Outpatient Medications:     acetaminophen (Tylenol) 500 mg tablet, Take 2 tablets (1,000 mg) by mouth 3 times a day as needed for mild pain (1 - 3), moderate pain (4 - 6) or fever (temp greater than 38.0 C)., Disp: , Rfl:     allopurinol (Zyloprim) 300 mg tablet, Take 1 tablet (300 mg) by mouth once daily., Disp: 90 tablet, Rfl: 3    aspirin 81 mg chewable tablet, Chew 1 tablet (81 mg) once daily., Disp: , Rfl:     diclofenac sodium (Voltaren) 1 % gel, Apply 4.5 inches (4 g) topically 2 times a day as needed (pain)., Disp: , Rfl:     gabapentin (Neurontin) 300 mg capsule, Take 1 capsule (300 mg) by mouth once daily at bedtime., Disp: 90 capsule, Rfl: 3    hydroCHLOROthiazide (Microzide) 12.5 mg tablet, Take 1 tablet (12.5 mg) by mouth once daily., Disp: 90 tablet, Rfl: 3    levothyroxine (Synthroid, Levoxyl) 150 mcg tablet, Take 1 tablet (150 mcg) by mouth once daily in the morning. Take before meals., Disp: 90 tablet, Rfl: 3    metFORMIN  mg 24 hr tablet, Take 1 tablet (500 mg) by mouth once daily with breakfast., Disp: 90 tablet, Rfl: 3    NIFEdipine ER (NIFEdipine CC) 60 mg 24 hr tablet, Take 1 tablet (60 mg) by mouth once daily with breakfast., Disp: 90 tablet, Rfl: 3    omeprazole " (PriLOSEC) 40 mg DR capsule, Take 1 capsule (40 mg) by mouth every other day., Disp: 45 capsule, Rfl: 3    simvastatin (Zocor) 10 mg tablet, Take 1 tablet (10 mg) by mouth once daily., Disp: 90 tablet, Rfl: 3     Continuous medications    PRN medications, reviewed                                        I have personally reviewed the following imaging for brain (CT/ MR) and results and discussed in detail with the patient/care giver/family : non spc small vessel ischemic dis.  1-      Assessment/Plan     Dizziness one time in jan of this year    Aspirin 81 mg q d    HTN : Keep BP and P     Patient/Family Education: Extensive time was spent educating the patient on relevant anatomy, clinical findings and imaging, as well as discussing the potential diagnoses as discussed above.  Pharmacology: as above. Exercise: I discussed the importance of maintaining a daily exercise program, including stretching and strengthening. Preventative strategies were reviewed, specifically avoidance of any exercises that exacerbate pain.Return to online virtual visit/ clinic visit for follow-up with neurologist after 12 month  or sooner as needed.The patient expressed understanding and agreement with the assessment and plan.  Patient encouraged to contact us should they have any questions, concerns, or any changes in symptoms. Thank you for allowing me to participate in the care of your patient.** This note is created using speech recognition transcription software. Despite proofreading, several typographical errors might be present that might affect the meaning of the content. Please call with any questions.**          Ernesto Javier MD

## 2024-05-31 NOTE — PROGRESS NOTES
Oncology Visit     Primary Care Provider: MD Jeremi Espinoza N Rad onc    Cancer History  Prostate Cancer - Unfav Intd  Treatment  -elevated PSA, I PSA 6.63, MRI 11/23 - lesion in right transition zone, right peripheral zone, non specific focus in left peripheral zone, Biopsy 12/29/23 -lesions 3+4 equal 7 grade group 2 involving 4 cores, Chantilly's 4+3 grade group 3 involving 3 cores, PET PSMA -focally increased expression within the prostate gland right greater than left mild hypermetabolic activity within small bilateral inguinal lymph nodes SUV 3.1  / no other mets,reviewed in TB and inguinal nodes thought reactive not c/w mets  -refused trial, and decision for eligard  3/2024  -XRT to prostate completed 3/29/24    Other contributing history  TAVR, HLD, HTN,  DM, Gout, Hypothyroid, ED visit 1/24 - MRI possible NPH , Basal Cell carcinoma,     HPI  He is status post radiation.  He does note some issues with some ongoing difficulty with fatigue and hot flashes related to the ADT.  Denies any worsening urinary symptoms.  Denies any nausea, vomiting, fevers, chills, easy bruising or bleeding denies any chest pain or chest tightness denies any melena or bright red blood per rectum.  Appetite and energy is otherwise stable.    ROS:  10-pt ROS reviewed and negative except as mentioned above.    Medications: below was reviewed and is accurate  Current Outpatient Medications   Medication Instructions    acetaminophen (TYLENOL) 1,000 mg, oral, 3 times daily PRN    allopurinol (ZYLOPRIM) 300 mg, oral, Daily    aspirin 81 mg, oral, Daily    calcium carbonate-vitamin D3 500 mg-5 mcg (200 unit) tablet 2 tablets, oral, Daily    diclofenac sodium (VOLTAREN) 4 g, Topical, 2 times daily PRN    gabapentin (NEURONTIN) 300 mg, oral, Nightly    hydroCHLOROthiazide (MICROZIDE) 12.5 mg, oral, Daily    levothyroxine (SYNTHROID, LEVOXYL) 150 mcg, oral, Daily before  "breakfast    metFORMIN XR (GLUCOPHAGE-XR) 500 mg, oral, Daily with breakfast    NIFEdipine ER (ADALAT CC) 60 mg, oral, Daily with breakfast    omeprazole (PRILOSEC) 40 mg, oral, Every 48 hours    simvastatin (ZOCOR) 10 mg, oral, Daily        Past Medical/ Social / Surgical / Family history reviewed    Physical exam:  Vitals:    06/03/24 0857   BP: 136/74   BP Location: Left arm   Patient Position: Sitting   BP Cuff Size: Adult long   Pulse: 60   Resp: 18   Temp: 36 °C (96.8 °F)   TempSrc: Temporal   SpO2: 97%   Weight: 133 kg (293 lb 1.6 oz)   Height: (S) 1.77 m (5' 9.69\")       GEN: NAD, well-appearing  HEENT: no clear lesions seen  NECK: no clear masses  LYMPH: no palpable adenopathy  SKIN: no concerning new lesions, some areas of well-healed surgical scars and keratoses appreciated - stable  LUNGS: CTA 2/6 HSM  CV: RRR no clear R/G  ABD: Soft, NTND. No rebound or guarding.  EXT: Symmetric lower extremity edema - minimal improvement  NEURO: Grossly intact. No focal deficits.  PSYCH: Normal mood and affect    Radiology review  Reviewed in detail personally and for detail see results in EPIC        Genomics Data    Laboratory review  Reviewed in detail personally and for detail see results in [facility]  Lab Results   Component Value Date    WBC 3.0 (L) 06/03/2024    HGB 14.0 06/03/2024    HCT 41.9 06/03/2024    MCV 86 06/03/2024     06/03/2024     Lab Results   Component Value Date    GLUCOSE 116 (H) 03/14/2024    CALCIUM 9.4 03/14/2024     03/14/2024    K 3.6 03/14/2024    CO2 29 03/14/2024     03/14/2024    BUN 12 03/14/2024    CREATININE 1.18 03/14/2024     Lab Results   Component Value Date    PSA 6.43 (H) 10/16/2023    PSA 6.1 (H) 09/14/2023    PSA 5.5 (H) 09/06/2022     Lab Results   Component Value Date    ALT 26 03/14/2024    AST 27 03/14/2024    ALKPHOS 80 03/14/2024    BILITOT 0.6 03/14/2024       ASSESSMENT AND PLAN     Prostate Cancer -s/p ADT 6months, and s/p XRT, due for labs and the " other labs are pending slight decrease in white blood cell count could be related to radiation discussed interventions to help with hot flashes including oxybutynin Neurontin Effexor and he wanted to hold off for now we will continue to monitor await PSA and testosterone if overall stable continue with follow-up in 3 months with labs and continue follow-up with radiation oncology.  Discussed also as he was asking about testosterone placement we will hold off for now.      Stan Mahmood MD  Senior Attending Physician/  in Medicine [facility] [employer]  [facility]  Patient line [phone]  Fax [phone]

## 2024-06-03 ENCOUNTER — LAB (OUTPATIENT)
Dept: LAB | Facility: CLINIC | Age: 74
End: 2024-06-03
Payer: MEDICARE

## 2024-06-03 ENCOUNTER — OFFICE VISIT (OUTPATIENT)
Dept: HEMATOLOGY/ONCOLOGY | Facility: CLINIC | Age: 74
End: 2024-06-03
Payer: MEDICARE

## 2024-06-03 VITALS
DIASTOLIC BLOOD PRESSURE: 74 MMHG | TEMPERATURE: 96.8 F | BODY MASS INDEX: 41.96 KG/M2 | WEIGHT: 293.1 LBS | RESPIRATION RATE: 18 BRPM | HEIGHT: 70 IN | HEART RATE: 60 BPM | OXYGEN SATURATION: 97 % | SYSTOLIC BLOOD PRESSURE: 136 MMHG

## 2024-06-03 DIAGNOSIS — C61 MALIGNANT NEOPLASM OF PROSTATE (MULTI): ICD-10-CM

## 2024-06-03 DIAGNOSIS — C61 PROSTATE CANCER (MULTI): ICD-10-CM

## 2024-06-03 LAB
ALBUMIN SERPL BCP-MCNC: 4.2 G/DL (ref 3.4–5)
ALP SERPL-CCNC: 74 U/L (ref 33–136)
ALT SERPL W P-5'-P-CCNC: 27 U/L (ref 10–52)
ANION GAP SERPL CALC-SCNC: 12 MMOL/L (ref 10–20)
AST SERPL W P-5'-P-CCNC: 29 U/L (ref 9–39)
BASOPHILS # BLD AUTO: 0.03 X10*3/UL (ref 0–0.1)
BASOPHILS NFR BLD AUTO: 1 %
BILIRUB SERPL-MCNC: 0.5 MG/DL (ref 0–1.2)
BUN SERPL-MCNC: 21 MG/DL (ref 6–23)
CALCIUM SERPL-MCNC: 10 MG/DL (ref 8.6–10.3)
CHLORIDE SERPL-SCNC: 104 MMOL/L (ref 98–107)
CO2 SERPL-SCNC: 27 MMOL/L (ref 21–32)
CREAT SERPL-MCNC: 1.11 MG/DL (ref 0.5–1.3)
EGFRCR SERPLBLD CKD-EPI 2021: 70 ML/MIN/1.73M*2
EOSINOPHIL # BLD AUTO: 0.2 X10*3/UL (ref 0–0.4)
EOSINOPHIL NFR BLD AUTO: 6.7 %
ERYTHROCYTE [DISTWIDTH] IN BLOOD BY AUTOMATED COUNT: 16 % (ref 11.5–14.5)
GLUCOSE SERPL-MCNC: 136 MG/DL (ref 74–99)
HCT VFR BLD AUTO: 41.9 % (ref 41–52)
HGB BLD-MCNC: 14 G/DL (ref 13.5–17.5)
IMM GRANULOCYTES # BLD AUTO: 0.01 X10*3/UL (ref 0–0.5)
IMM GRANULOCYTES NFR BLD AUTO: 0.3 % (ref 0–0.9)
LYMPHOCYTES # BLD AUTO: 0.71 X10*3/UL (ref 0.8–3)
LYMPHOCYTES NFR BLD AUTO: 23.7 %
MCH RBC QN AUTO: 28.9 PG (ref 26–34)
MCHC RBC AUTO-ENTMCNC: 33.4 G/DL (ref 32–36)
MCV RBC AUTO: 86 FL (ref 80–100)
MONOCYTES # BLD AUTO: 0.41 X10*3/UL (ref 0.05–0.8)
MONOCYTES NFR BLD AUTO: 13.7 %
NEUTROPHILS # BLD AUTO: 1.63 X10*3/UL (ref 1.6–5.5)
NEUTROPHILS NFR BLD AUTO: 54.6 %
NRBC BLD-RTO: 0 /100 WBCS (ref 0–0)
PLATELET # BLD AUTO: 168 X10*3/UL (ref 150–450)
POTASSIUM SERPL-SCNC: 3.8 MMOL/L (ref 3.5–5.3)
PROT SERPL-MCNC: 6.9 G/DL (ref 6.4–8.2)
PSA SERPL-MCNC: <0.1 NG/ML
RBC # BLD AUTO: 4.85 X10*6/UL (ref 4.5–5.9)
SODIUM SERPL-SCNC: 139 MMOL/L (ref 136–145)
TESTOST SERPL-MCNC: <30 NG/DL (ref 240–1000)
WBC # BLD AUTO: 3 X10*3/UL (ref 4.4–11.3)

## 2024-06-03 PROCEDURE — 99214 OFFICE O/P EST MOD 30 MIN: CPT | Performed by: INTERNAL MEDICINE

## 2024-06-03 PROCEDURE — 1157F ADVNC CARE PLAN IN RCRD: CPT | Performed by: INTERNAL MEDICINE

## 2024-06-03 PROCEDURE — 85025 COMPLETE CBC W/AUTO DIFF WBC: CPT | Performed by: INTERNAL MEDICINE

## 2024-06-03 PROCEDURE — 3048F LDL-C <100 MG/DL: CPT | Performed by: INTERNAL MEDICINE

## 2024-06-03 PROCEDURE — 1126F AMNT PAIN NOTED NONE PRSNT: CPT | Performed by: INTERNAL MEDICINE

## 2024-06-03 PROCEDURE — 36415 COLL VENOUS BLD VENIPUNCTURE: CPT | Performed by: INTERNAL MEDICINE

## 2024-06-03 PROCEDURE — 84153 ASSAY OF PSA TOTAL: CPT | Performed by: INTERNAL MEDICINE

## 2024-06-03 PROCEDURE — 3044F HG A1C LEVEL LT 7.0%: CPT | Performed by: INTERNAL MEDICINE

## 2024-06-03 PROCEDURE — 80053 COMPREHEN METABOLIC PANEL: CPT | Performed by: INTERNAL MEDICINE

## 2024-06-03 PROCEDURE — 84403 ASSAY OF TOTAL TESTOSTERONE: CPT | Performed by: INTERNAL MEDICINE

## 2024-06-03 PROCEDURE — 3078F DIAST BP <80 MM HG: CPT | Performed by: INTERNAL MEDICINE

## 2024-06-03 PROCEDURE — 1159F MED LIST DOCD IN RCRD: CPT | Performed by: INTERNAL MEDICINE

## 2024-06-03 PROCEDURE — 3075F SYST BP GE 130 - 139MM HG: CPT | Performed by: INTERNAL MEDICINE

## 2024-06-03 RX ORDER — FERROUS SULFATE, DRIED 160(50) MG
2 TABLET, EXTENDED RELEASE ORAL DAILY
COMMUNITY

## 2024-06-03 ASSESSMENT — PAIN SCALES - GENERAL: PAINLEVEL: 0-NO PAIN

## 2024-06-03 NOTE — PROGRESS NOTES
Patient here for follow up visit.    Labs completed this morning before appointment.    Patient here with Wife Kelsey      Hot flashed noted amd tolerable but getting worse.   Scheduled for knee surgery in the next month. Started taking OTC calcium and Vit D supplements in the last month due to knee pain and support.    Medications and Allergies reviewed and reconciled this visit.    Follow up per MD request. Follow up in 3 months with labs.   Discussed hot flashes with the patient who does not want to do anything further at this time.      Patient reports availability and use of mychart, Reviewed this is a good place to communicate with the team as well as review labs and upcoming orders.      No barriers to education noted, patient agrees to current plan and verbalized understanding using teach back method.

## 2024-07-17 ENCOUNTER — TELEPHONE (OUTPATIENT)
Dept: HEMATOLOGY/ONCOLOGY | Facility: CLINIC | Age: 74
End: 2024-07-17
Payer: MEDICARE

## 2024-07-17 DIAGNOSIS — C61 PROSTATE CANCER (MULTI): Primary | ICD-10-CM

## 2024-07-17 RX ORDER — GABAPENTIN 300 MG/1
300 CAPSULE ORAL NIGHTLY
Qty: 30 CAPSULE | Refills: 3 | Status: SHIPPED | OUTPATIENT
Start: 2024-07-17 | End: 2025-07-17

## 2024-07-17 NOTE — TELEPHONE ENCOUNTER
Updated re options for hot flashes   Effexor  vs increasing neurontin to help flashes  On 100mg at night  Increase to 300 at bedtime    And monitor

## 2024-07-18 ENCOUNTER — TELEPHONE (OUTPATIENT)
Dept: HEMATOLOGY/ONCOLOGY | Facility: CLINIC | Age: 74
End: 2024-07-18
Payer: MEDICARE

## 2024-07-18 NOTE — TELEPHONE ENCOUNTER
Pt and his wife were very clear and adamant that he was taking 100 mg of gabapentin the person who took this note down wrote allopurinol which is incorrect I did explain to them in detail yesterday that we had a dose of 300 mg but they were adamant that it was 100 mg.  After extensive discussion yesterday we increased the 100 mg to 300 mg based on what they told us but now it turns out he has been taking the 300 mg.  At this point discussed increasing the dose to 300 mg twice a day to see if he can help with hot flashes he then asked about sending to [facility] and decision was made to hold off until we know that the current dose work he will let us know if improvement after 3 weeks and then we can send the medication to [facility] if needed all questions were answered in detail.

## 2024-08-07 ENCOUNTER — HOSPITAL ENCOUNTER (OUTPATIENT)
Dept: RADIATION ONCOLOGY | Facility: CLINIC | Age: 74
Setting detail: RADIATION/ONCOLOGY SERIES
Discharge: HOME | End: 2024-08-07
Payer: MEDICARE

## 2024-08-07 VITALS
BODY MASS INDEX: 42.82 KG/M2 | OXYGEN SATURATION: 96 % | WEIGHT: 295.75 LBS | RESPIRATION RATE: 16 BRPM | SYSTOLIC BLOOD PRESSURE: 148 MMHG | TEMPERATURE: 95.5 F | HEART RATE: 80 BPM | DIASTOLIC BLOOD PRESSURE: 78 MMHG

## 2024-08-07 DIAGNOSIS — R23.2 HOT FLASHES: Primary | ICD-10-CM

## 2024-08-07 DIAGNOSIS — C61 MALIGNANT NEOPLASM OF PROSTATE (MULTI): ICD-10-CM

## 2024-08-07 PROCEDURE — 99213 OFFICE O/P EST LOW 20 MIN: CPT | Performed by: RADIOLOGY

## 2024-08-07 RX ORDER — VENLAFAXINE HYDROCHLORIDE 37.5 MG/1
37.5 CAPSULE, EXTENDED RELEASE ORAL DAILY
Qty: 30 CAPSULE | Refills: 1 | Status: SHIPPED | OUTPATIENT
Start: 2024-08-07 | End: 2024-10-06

## 2024-08-07 ASSESSMENT — PAIN SCALES - GENERAL: PAINLEVEL: 1

## 2024-08-07 ASSESSMENT — ENCOUNTER SYMPTOMS
OCCASIONAL FEELINGS OF UNSTEADINESS: 1
DEPRESSION: 0
LOSS OF SENSATION IN FEET: 1

## 2024-08-07 NOTE — PROGRESS NOTES
Radiation Oncology Follow-Up    Patient Name:  Jerald Sandoval  MRN:  [id number]  :  1950    Referring Provider: Miguel Ángel Grayson MD  Primary Care Provider: Jimmie Rogers MD  Care Team: Patient Care Team:  Jimmie Rogers MD as PCP - General (Internal Medicine)  Jimmie Rogers MD as PCP - [employer] PCP  Stan Mahmood MD as Consulting Physician (Hematology and Oncology)    Date of Service: 2024    SUBJECTIVE  History of Present Illness:  Jerald Sandoval is a 73 y.o. male who was previously seen at the [facility] Department of Radiation Oncology for prostate cancer.    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 PSA 6.4. 23 ml prostate. NCCN unfavorable intermediate risk because of GG3. IPSS 8. QOL 3. CHANTELLE ~20. We will treat prostate 60 Gy / 20 fx (SIB) and distal SVs 44 Gy / 20 fx. PTVs are CTV + 0.5 cm in all directions, except 0.3 cm posteriorly. All IMRT, daily CBCT. He will get 4-6 mos of ADT w Dr Mahmood. He declined spacer given his UTI from the biopsy. He declined NRG  010.    Aug 7 2024 he reports for follow up. CHANTELLE  0. IPSS 18. His chief complaint has been hot flashes. He had his gabapentin dose increased to 600 mg per day w minimal benefit for now. No GI sx.    Treatment Rendered:   IMRT: Midline Prostate    Treatment Period Technique Fraction Dose Fractions Total Dose   Course 1 3/4/2024-3/29/2024  (days elapsed: 25)         Prostate/SV 3/4/2024-3/29/2024 VMAT 300 / 300 cGy  6000 / 6,000 cGy       Review of Systems:   Review of Systems - Oncology    Performance Status:   The Karnofsky performance scale today is 100, Fully active, able to carry on all pre-disease performed without restriction (ECOG equivalent 0).   General: Negative for weight changes, night sweats, fever, recent trauma.    Eyes: Negative for blurriness, eye pain, blind spots.   Ears, nose, mouth, throat: Negative for changes in hearing, changes in taste, sore throat, mouth sores.  Cardiovascular: Negative for palpitations, chest pain, tightness.   Pulmonary: Negative for shortness of breath, cough, sputum.   Gastrointestinal: Negative for indigestion, diarrhea, constipation, abdominal pain, blood in stool, nausea/vomiting.   Genitourinary: Per HPI.   Neuro: Negative for numbness, tingling, weakness, changes in speech.   Musculoskeletal: Negative for muscle pain, joint pain, edema, tenderness.   Endocrine: Per HPI.   Other: All others negative.      OBJECTIVE  Vital Signs:  /78   Pulse 80   Temp 35.3 °C (95.5 °F)   Resp 16   Wt 134 kg (295 lb 12 oz)   SpO2 96%   BMI 42.82 kg/m²   Physical Exam   General: The patient appears well and is in no acute distress.   Cognition: Acceptable understanding of the essential elements of the medical situation.   Speech: Fluent and coherent.   Eyes: No conjunctival infection. Anicteric. Extraocular movements intact.   Head, ears, nose, mouth, throat: Atraumatic. Moist mucous membranes. Trachea midline.   Lymphatic: No visible cervical lymphadenopathy.   Cardiovascular: No visible jugular venous distention. Skin perfused.   Pulmonary: Breathes comfortably on room air without stridor or cough.   Abdomen: Nondistended.   Extremities: No edema or muscle wasting.   Neurologic: Cranial nerves II-XII are grossly intact. Moves all extremities. No gross focal deficits.   Psychiatric: Mood is appropriate.   Skin: No visible rash or ulcers.     Lab Results   Component Value Date    PSA <0.10 06/03/2024    PSA 6.43 (H) 10/16/2023    PSA 6.1 (H) 09/14/2023         ASSESSMENT:   Jerald Sandoval is a 73 y.o. male with prostate ca UIR sp RT    PLAN:    The patient is now 4 mos status post completion of radiation therapy and is doing well without clinical evidence of disease.     His chief complaint is hot  flashes, and I reviewed options per the seminal review on this topic:  https://pubmed.ncbi.nlm.nih.gov/36010624/  Based on these options, I recommended  1, diet and exercise regimen. Diet should eliminate processed/junk foods, added sugars, fried foods (his favorite) and include mostly natural plant based vegetables and fruits. Exercise should be 1 hour per day of relatively intense resistance, cardio, flexibility.  2, venlafaxine. I sent this to his pharmacy.    His  symptoms are not bothersome and he prefers to monitor for now.    The patient has no other apparent chronic toxicity secondary to radiation therapy.     I recommend the patient follow-up with our team in 12 months. We scheduled this appointment today.    The patient will see Dr Mahmood in the interim.    The time spent in follow up was 20 minutes.    Miguel Ángel Grayson MD MS  Department of Radiation Oncology

## 2024-08-07 NOTE — PROGRESS NOTES
Wili seen with wife for follow up aftr 20fx to prostate ended 3.29.24. CHANTELLE 0, IPSS 18, patient reports frequency, urgency, and weak stream almost always, nocturia 3x/night, spontaneous incontinence, and hot flashes secondary to hormone therapy. Per Dr. Grayson patient to return for follow up in 1 year. Reminder card given. Patient verbalizes understanding with verbal teach back.

## 2024-08-07 NOTE — PROGRESS NOTES
Radiation Oncology Nursing Note    Pain: The patient's current pain level was assessed.  They report currently having a pain of 1 out of 10.  They feel their pain is under control without the use of pain medications.    Review of Systems:  Review of Systems - Oncology

## 2024-08-22 ENCOUNTER — APPOINTMENT (OUTPATIENT)
Dept: HEMATOLOGY/ONCOLOGY | Facility: CLINIC | Age: 74
End: 2024-08-22
Payer: MEDICARE

## 2024-09-12 ENCOUNTER — NURSE TRIAGE (OUTPATIENT)
Dept: ADMISSION | Facility: HOSPITAL | Age: 74
End: 2024-09-12
Payer: MEDICARE

## 2024-09-12 NOTE — TELEPHONE ENCOUNTER
Pt called to report there has been no improvement in hot flashes with gabapentin 300mg BID.  He continues to experience hot flashes 6-7 times daily.  No improvement in hot flashes with gabapentin, 300mg bid. 6-7 times per day and at night.    He does note that the initial prescription for gabapentin came from another doctor for neuropathy is unchanged with dose increase.  He has an upcoming appt with that provider to discuss.    Pt also reports that he's had a localized rash for about two weeks and wanted to make sure not related to gabapentin.  He has several red, itchy bumps on his forearm and one thigh.  No blisters or drainage.  Itching is managed with calamine lotion.  He endorses clearing out some brush in his yard about a week prior to rash starting.  He does think rash looks similar to poison ivy rash he's had in the past but pt does not think there is any poison ivy in the area of the yard he was working in.    Additional Information   Commented on: If the rash itches, is it getting worse? Does anything help relieve the itching?     Using calamine lotion for itching   Commented on: What else do you want to tell me about this problem?     No fevers, chest pain or difficulty breathing    Protocols used: Rash

## 2024-09-12 NOTE — TELEPHONE ENCOUNTER
Discussed options  On effexor  Neurontin    Discussed in detail rash usually not seen with Neurontin but could be at this point he will follow-up with dermatology and his primary care physician discussed all options and he would like to taper off the Neurontin tapering scheduling given to patient and he will taper off of Neurontin continue on the Effexor discussed potential referral to integrative oncology and he refused aware of the risk benefits and alternatives.    Keep f/up next week

## 2024-09-16 ENCOUNTER — LAB (OUTPATIENT)
Dept: LAB | Facility: CLINIC | Age: 74
End: 2024-09-16
Payer: MEDICARE

## 2024-09-16 ENCOUNTER — OFFICE VISIT (OUTPATIENT)
Dept: HEMATOLOGY/ONCOLOGY | Facility: CLINIC | Age: 74
End: 2024-09-16
Payer: MEDICARE

## 2024-09-16 VITALS
BODY MASS INDEX: 42.76 KG/M2 | TEMPERATURE: 97.5 F | WEIGHT: 295.31 LBS | RESPIRATION RATE: 18 BRPM | SYSTOLIC BLOOD PRESSURE: 120 MMHG | OXYGEN SATURATION: 96 % | DIASTOLIC BLOOD PRESSURE: 72 MMHG | HEART RATE: 78 BPM

## 2024-09-16 DIAGNOSIS — C61 PROSTATE CANCER (MULTI): ICD-10-CM

## 2024-09-16 DIAGNOSIS — C61 MALIGNANT NEOPLASM OF PROSTATE (MULTI): ICD-10-CM

## 2024-09-16 LAB
ALBUMIN SERPL BCP-MCNC: 4.1 G/DL (ref 3.4–5)
ALP SERPL-CCNC: 85 U/L (ref 33–136)
ALT SERPL W P-5'-P-CCNC: 47 U/L (ref 10–52)
ANION GAP SERPL CALC-SCNC: 14 MMOL/L (ref 10–20)
AST SERPL W P-5'-P-CCNC: 43 U/L (ref 9–39)
BASOPHILS # BLD AUTO: 0.04 X10*3/UL (ref 0–0.1)
BASOPHILS NFR BLD AUTO: 1 %
BILIRUB SERPL-MCNC: 0.4 MG/DL (ref 0–1.2)
BUN SERPL-MCNC: 14 MG/DL (ref 6–23)
CALCIUM SERPL-MCNC: 9.8 MG/DL (ref 8.6–10.3)
CHLORIDE SERPL-SCNC: 104 MMOL/L (ref 98–107)
CO2 SERPL-SCNC: 27 MMOL/L (ref 21–32)
CREAT SERPL-MCNC: 0.96 MG/DL (ref 0.5–1.3)
EGFRCR SERPLBLD CKD-EPI 2021: 83 ML/MIN/1.73M*2
EOSINOPHIL # BLD AUTO: 0.19 X10*3/UL (ref 0–0.4)
EOSINOPHIL NFR BLD AUTO: 4.5 %
ERYTHROCYTE [DISTWIDTH] IN BLOOD BY AUTOMATED COUNT: 13.1 % (ref 11.5–14.5)
GLUCOSE SERPL-MCNC: 143 MG/DL (ref 74–99)
HCT VFR BLD AUTO: 40.3 % (ref 41–52)
HGB BLD-MCNC: 13.8 G/DL (ref 13.5–17.5)
IMM GRANULOCYTES # BLD AUTO: 0 X10*3/UL (ref 0–0.5)
IMM GRANULOCYTES NFR BLD AUTO: 0 % (ref 0–0.9)
LYMPHOCYTES # BLD AUTO: 0.93 X10*3/UL (ref 0.8–3)
LYMPHOCYTES NFR BLD AUTO: 22.2 %
MCH RBC QN AUTO: 31.4 PG (ref 26–34)
MCHC RBC AUTO-ENTMCNC: 34.2 G/DL (ref 32–36)
MCV RBC AUTO: 92 FL (ref 80–100)
MONOCYTES # BLD AUTO: 0.56 X10*3/UL (ref 0.05–0.8)
MONOCYTES NFR BLD AUTO: 13.4 %
NEUTROPHILS # BLD AUTO: 2.47 X10*3/UL (ref 1.6–5.5)
NEUTROPHILS NFR BLD AUTO: 58.9 %
NRBC BLD-RTO: 0 /100 WBCS (ref 0–0)
PLATELET # BLD AUTO: 182 X10*3/UL (ref 150–450)
POTASSIUM SERPL-SCNC: 3.9 MMOL/L (ref 3.5–5.3)
PROT SERPL-MCNC: 7 G/DL (ref 6.4–8.2)
PSA SERPL-MCNC: <0.1 NG/ML
RBC # BLD AUTO: 4.39 X10*6/UL (ref 4.5–5.9)
SODIUM SERPL-SCNC: 141 MMOL/L (ref 136–145)
TESTOST SERPL-MCNC: <30 NG/DL (ref 240–1000)
WBC # BLD AUTO: 4.2 X10*3/UL (ref 4.4–11.3)

## 2024-09-16 PROCEDURE — 99214 OFFICE O/P EST MOD 30 MIN: CPT | Performed by: INTERNAL MEDICINE

## 2024-09-16 PROCEDURE — 1126F AMNT PAIN NOTED NONE PRSNT: CPT | Performed by: INTERNAL MEDICINE

## 2024-09-16 PROCEDURE — 85025 COMPLETE CBC W/AUTO DIFF WBC: CPT

## 2024-09-16 PROCEDURE — 3074F SYST BP LT 130 MM HG: CPT | Performed by: INTERNAL MEDICINE

## 2024-09-16 PROCEDURE — 1157F ADVNC CARE PLAN IN RCRD: CPT | Performed by: INTERNAL MEDICINE

## 2024-09-16 PROCEDURE — 36415 COLL VENOUS BLD VENIPUNCTURE: CPT

## 2024-09-16 PROCEDURE — 1159F MED LIST DOCD IN RCRD: CPT | Performed by: INTERNAL MEDICINE

## 2024-09-16 PROCEDURE — 84153 ASSAY OF PSA TOTAL: CPT

## 2024-09-16 PROCEDURE — 3048F LDL-C <100 MG/DL: CPT | Performed by: INTERNAL MEDICINE

## 2024-09-16 PROCEDURE — 84403 ASSAY OF TOTAL TESTOSTERONE: CPT

## 2024-09-16 PROCEDURE — 3044F HG A1C LEVEL LT 7.0%: CPT | Performed by: INTERNAL MEDICINE

## 2024-09-16 PROCEDURE — 80053 COMPREHEN METABOLIC PANEL: CPT

## 2024-09-16 PROCEDURE — 3078F DIAST BP <80 MM HG: CPT | Performed by: INTERNAL MEDICINE

## 2024-09-16 ASSESSMENT — PAIN SCALES - GENERAL: PAINLEVEL: 0-NO PAIN

## 2024-09-16 NOTE — PROGRESS NOTES
Patient here for 3 month follow up visit Prostate cancer [facility].      Hot flashes are intense for him. Receives gabapentin which was recently increased due to the hot flashes. Receiving effexor for assist with hot flashes, feels that it is starting to feel better.    Weaning down the gabapentin back to daily. Will review with Dr Mahmood.  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      Patient here with wife Kelsey    Labs completed today for review.      medications and Allergies reviewed and reconciled this visit.    Follow up per MD request. Follow up in 3 months with labs.    Patient reports availability and use of mychart, Reviewed this is a good place to communicate with the team as well as review labs and upcoming orders.      No barriers to education noted, patient agrees to current plan and verbalized understanding using teach back method.

## 2024-09-16 NOTE — PROGRESS NOTES
Oncology Visit     Primary Care Provider: MD Jeremi Espinoza N Rad onc    Cancer History  Prostate Cancer - Unfav Intd  Treatment  -elevated PSA, I PSA 6.63, MRI 11/23 - lesion in right transition zone, right peripheral zone, non specific focus in left peripheral zone, Biopsy 12/29/23 -lesions 3+4 equal 7 grade group 2 involving 4 cores, [place]'s 4+3 grade group 3 involving 3 cores, PET PSMA -focally increased expression within the prostate gland right greater than left mild hypermetabolic activity within small bilateral inguinal lymph nodes SUV 3.1  / no other mets,reviewed in TB and inguinal nodes thought reactive not c/w mets  -refused trial, and decision for eligard 6m 3/2024  -XRT to prostate completed 3/29/24    Other contributing history  TAVR, HLD, HTN,  DM, Gout, Hypothyroid, ED visit 1/24 - MRI possible NPH , Basal Cell carcinoma, hot flashes    HPI  Ongoing issues with severe hot flashes with minimal improvement with the Effexor and Neurontin  The patient is accompanied by his wife.  Still ongoing issues with hot flashes and fatigue.  He notes a minimal improvement since starting on the Effexor but trying to taper down on the Neurontin.  Denies any issues with nausea, vomiting, fevers, chills, easy bruising or bleeding denies any worsening issues with urinary symptoms.    ROS:  10-pt ROS reviewed and negative except as mentioned above.    Medications: below was reviewed and is accurate  Current Outpatient Medications   Medication Instructions    acetaminophen (TYLENOL) 1,000 mg, oral, 3 times daily PRN    allopurinol (ZYLOPRIM) 300 mg, oral, Daily    aspirin 81 mg, oral, Daily    calcium carbonate-vitamin D3 500 mg-5 mcg (200 unit) tablet 2 tablets, oral, Daily    diclofenac sodium (VOLTAREN) 4 g, Topical, 2 times daily PRN    hydroCHLOROthiazide (MICROZIDE) 12.5 mg, oral, Daily    levothyroxine (SYNTHROID, LEVOXYL) 150 mcg, oral,  Daily before breakfast    metFORMIN XR (GLUCOPHAGE-XR) 500 mg, oral, Daily with breakfast    NIFEdipine ER (ADALAT CC) 60 mg, oral, Daily with breakfast    omeprazole (PRILOSEC) 40 mg, oral, Every 48 hours    simvastatin (ZOCOR) 10 mg, oral, Daily    venlafaxine XR (EFFEXOR XR) 37.5 mg, oral, Daily, Do not crush or chew.        Past Medical/ Social / Surgical / Family history reviewed    Physical exam:  Vitals:    09/16/24 1311   BP: 120/72   BP Location: Left arm   Patient Position: Sitting   BP Cuff Size: Adult long   Pulse: 78   Resp: 18   Temp: 36.4 °C (97.5 °F)   TempSrc: Temporal   SpO2: 96%   Weight: 134 kg (295 lb 4.9 oz)       GEN: NAD, well-appearing  HEENT: no clear lesions seen  SKIN: no concerning new lesions, some areas of well-healed surgical scars and keratoses appreciated - stable  LUNGS: CTA 2/6 HSM  CV: RRR no clear R/G  ABD: Soft, NTND. No rebound or guarding.  EXT: Symmetric lower extremity edema - minimal improvement  NEURO: Grossly intact. No focal deficits.  PSYCH: Normal mood and affect    Radiology review  Reviewed in detail personally and for detail see results in EPIC        Genomics Data    Laboratory review  Reviewed in detail personally and for detail see results in [facility]  Lab Results   Component Value Date    WBC 4.2 (L) 09/16/2024    HGB 13.8 09/16/2024    HCT 40.3 (L) 09/16/2024    MCV 92 09/16/2024     09/16/2024     Lab Results   Component Value Date    GLUCOSE 143 (H) 09/16/2024    CALCIUM 9.8 09/16/2024     09/16/2024    K 3.9 09/16/2024    CO2 27 09/16/2024     09/16/2024    BUN 14 09/16/2024    CREATININE 0.96 09/16/2024     Lab Results   Component Value Date    PSA <0.10 06/03/2024    PSA 6.43 (H) 10/16/2023    PSA 6.1 (H) 09/14/2023     Lab Results   Component Value Date    ALT 47 09/16/2024    AST 43 (H) 09/16/2024    ALKPHOS 85 09/16/2024    BILITOT 0.4 09/16/2024       ASSESSMENT AND PLAN     Prostate Cancer -s/p ADT 6months, and s/p XRT, continues to  have suppression of PSA and also suppression of testosterone would expect once recovers testosterone that his symptoms would improve taper down on the Neurontin continue on Effexor discussed increasing dose but he wants to remain on that dose will arrange follow-up in 3 months with labs prior he will contact us for any other new complaints.  Hot flashes-see above  Borderline leukopenia-most likely related to radiation continue to monitor  Borderline liver function test-most likely related to recent alcohol follow-up in 3 months and follow-up with primary care physician.      Stan Mahmood MD  Senior Attending Physician/  in Medicine [employer]  [facility]  Patient line [phone]  Fax [phone]

## 2024-09-19 DIAGNOSIS — C61 PROSTATE CANCER (MULTI): ICD-10-CM

## 2024-09-19 RX ORDER — GABAPENTIN 300 MG/1
300 CAPSULE ORAL NIGHTLY
Qty: 90 CAPSULE | Refills: 3 | Status: SHIPPED | OUTPATIENT
Start: 2024-09-19 | End: 2025-09-19

## 2024-09-27 ENCOUNTER — LAB (OUTPATIENT)
Dept: LAB | Facility: LAB | Age: 74
End: 2024-09-27
Payer: MEDICARE

## 2024-09-27 DIAGNOSIS — R97.20 ELEVATED PROSTATE SPECIFIC ANTIGEN (PSA): ICD-10-CM

## 2024-09-27 DIAGNOSIS — G47.33 OSA (OBSTRUCTIVE SLEEP APNEA): ICD-10-CM

## 2024-09-27 DIAGNOSIS — Z87.39 HISTORY OF GOUT: ICD-10-CM

## 2024-09-27 DIAGNOSIS — E11.69 TYPE 2 DIABETES MELLITUS WITH OTHER SPECIFIED COMPLICATION, WITHOUT LONG-TERM CURRENT USE OF INSULIN: ICD-10-CM

## 2024-09-27 DIAGNOSIS — E03.8 HYPOTHYROIDISM DUE TO HASHIMOTO'S THYROIDITIS: ICD-10-CM

## 2024-09-27 DIAGNOSIS — Z01.89 ENCOUNTER FOR ROUTINE LABORATORY TESTING: ICD-10-CM

## 2024-09-27 DIAGNOSIS — E06.3 HYPOTHYROIDISM DUE TO HASHIMOTO'S THYROIDITIS: ICD-10-CM

## 2024-09-27 DIAGNOSIS — E66.01 MORBID OBESITY (MULTI): ICD-10-CM

## 2024-09-27 DIAGNOSIS — I10 PRIMARY HYPERTENSION: ICD-10-CM

## 2024-09-27 LAB
ALBUMIN SERPL BCP-MCNC: 4.2 G/DL (ref 3.4–5)
ALP SERPL-CCNC: 97 U/L (ref 33–136)
ALT SERPL W P-5'-P-CCNC: 76 U/L (ref 10–52)
ANION GAP SERPL CALC-SCNC: 12 MMOL/L (ref 10–20)
AST SERPL W P-5'-P-CCNC: 65 U/L (ref 9–39)
BASOPHILS # BLD AUTO: 0.04 X10*3/UL (ref 0–0.1)
BASOPHILS NFR BLD AUTO: 1.1 %
BILIRUB DIRECT SERPL-MCNC: 0.1 MG/DL (ref 0–0.3)
BILIRUB SERPL-MCNC: 0.5 MG/DL (ref 0–1.2)
BUN SERPL-MCNC: 20 MG/DL (ref 6–23)
CALCIUM SERPL-MCNC: 10.2 MG/DL (ref 8.6–10.3)
CHLORIDE SERPL-SCNC: 103 MMOL/L (ref 98–107)
CO2 SERPL-SCNC: 30 MMOL/L (ref 21–32)
CREAT SERPL-MCNC: 1.04 MG/DL (ref 0.5–1.3)
EGFRCR SERPLBLD CKD-EPI 2021: 75 ML/MIN/1.73M*2
EOSINOPHIL # BLD AUTO: 0.27 X10*3/UL (ref 0–0.4)
EOSINOPHIL NFR BLD AUTO: 7.3 %
ERYTHROCYTE [DISTWIDTH] IN BLOOD BY AUTOMATED COUNT: 13.5 % (ref 11.5–14.5)
EST. AVERAGE GLUCOSE BLD GHB EST-MCNC: 137 MG/DL
GLUCOSE SERPL-MCNC: 125 MG/DL (ref 74–99)
HBA1C MFR BLD: 6.4 %
HCT VFR BLD AUTO: 42.6 % (ref 41–52)
HGB BLD-MCNC: 14.1 G/DL (ref 13.5–17.5)
IMM GRANULOCYTES # BLD AUTO: 0.01 X10*3/UL (ref 0–0.5)
IMM GRANULOCYTES NFR BLD AUTO: 0.3 % (ref 0–0.9)
LYMPHOCYTES # BLD AUTO: 0.79 X10*3/UL (ref 0.8–3)
LYMPHOCYTES NFR BLD AUTO: 21.3 %
MCH RBC QN AUTO: 31.7 PG (ref 26–34)
MCHC RBC AUTO-ENTMCNC: 33.1 G/DL (ref 32–36)
MCV RBC AUTO: 96 FL (ref 80–100)
MONOCYTES # BLD AUTO: 0.49 X10*3/UL (ref 0.05–0.8)
MONOCYTES NFR BLD AUTO: 13.2 %
NEUTROPHILS # BLD AUTO: 2.11 X10*3/UL (ref 1.6–5.5)
NEUTROPHILS NFR BLD AUTO: 56.8 %
NRBC BLD-RTO: 0 /100 WBCS (ref 0–0)
PLATELET # BLD AUTO: 198 X10*3/UL (ref 150–450)
POTASSIUM SERPL-SCNC: 4.7 MMOL/L (ref 3.5–5.3)
PROT SERPL-MCNC: 6.5 G/DL (ref 6.4–8.2)
RBC # BLD AUTO: 4.45 X10*6/UL (ref 4.5–5.9)
SODIUM SERPL-SCNC: 140 MMOL/L (ref 136–145)
TSH SERPL-ACNC: 3.76 MIU/L (ref 0.44–3.98)
URATE SERPL-MCNC: 4.2 MG/DL (ref 4–7.5)
WBC # BLD AUTO: 3.7 X10*3/UL (ref 4.4–11.3)

## 2024-09-27 PROCEDURE — 84550 ASSAY OF BLOOD/URIC ACID: CPT

## 2024-09-27 PROCEDURE — 83036 HEMOGLOBIN GLYCOSYLATED A1C: CPT

## 2024-09-27 PROCEDURE — 80053 COMPREHEN METABOLIC PANEL: CPT

## 2024-09-27 PROCEDURE — 36415 COLL VENOUS BLD VENIPUNCTURE: CPT

## 2024-09-27 PROCEDURE — 82248 BILIRUBIN DIRECT: CPT

## 2024-09-27 PROCEDURE — 84443 ASSAY THYROID STIM HORMONE: CPT

## 2024-09-27 PROCEDURE — 85025 COMPLETE CBC W/AUTO DIFF WBC: CPT

## 2024-10-02 ENCOUNTER — APPOINTMENT (OUTPATIENT)
Dept: PRIMARY CARE | Facility: CLINIC | Age: 74
End: 2024-10-02
Payer: MEDICARE

## 2024-10-04 ENCOUNTER — OFFICE VISIT (OUTPATIENT)
Dept: PRIMARY CARE | Facility: CLINIC | Age: 74
End: 2024-10-04
Payer: MEDICARE

## 2024-10-04 VITALS
BODY MASS INDEX: 41.23 KG/M2 | HEIGHT: 70 IN | OXYGEN SATURATION: 97 % | SYSTOLIC BLOOD PRESSURE: 123 MMHG | HEART RATE: 66 BPM | TEMPERATURE: 97.4 F | WEIGHT: 288 LBS | DIASTOLIC BLOOD PRESSURE: 73 MMHG

## 2024-10-04 DIAGNOSIS — I25.10 CORONARY ARTERY DISEASE INVOLVING NATIVE CORONARY ARTERY OF NATIVE HEART WITHOUT ANGINA PECTORIS: ICD-10-CM

## 2024-10-04 DIAGNOSIS — M15.0 PRIMARY OSTEOARTHRITIS INVOLVING MULTIPLE JOINTS: ICD-10-CM

## 2024-10-04 DIAGNOSIS — Z12.12 ENCOUNTER FOR COLORECTAL CANCER SCREENING: ICD-10-CM

## 2024-10-04 DIAGNOSIS — I10 PRIMARY HYPERTENSION: ICD-10-CM

## 2024-10-04 DIAGNOSIS — R23.2 HOT FLASHES: ICD-10-CM

## 2024-10-04 DIAGNOSIS — G47.33 OSA (OBSTRUCTIVE SLEEP APNEA): ICD-10-CM

## 2024-10-04 DIAGNOSIS — Z87.39 HISTORY OF GOUT: ICD-10-CM

## 2024-10-04 DIAGNOSIS — E66.01 CLASS 3 SEVERE OBESITY WITH SERIOUS COMORBIDITY AND BODY MASS INDEX (BMI) OF 40.0 TO 44.9 IN ADULT, UNSPECIFIED OBESITY TYPE: ICD-10-CM

## 2024-10-04 DIAGNOSIS — Z12.11 ENCOUNTER FOR COLORECTAL CANCER SCREENING: ICD-10-CM

## 2024-10-04 DIAGNOSIS — K21.9 GASTROESOPHAGEAL REFLUX DISEASE WITHOUT ESOPHAGITIS: ICD-10-CM

## 2024-10-04 DIAGNOSIS — Z00.00 ANNUAL PHYSICAL EXAM: Primary | ICD-10-CM

## 2024-10-04 DIAGNOSIS — K27.9 PUD (PEPTIC ULCER DISEASE): ICD-10-CM

## 2024-10-04 DIAGNOSIS — E55.9 VITAMIN D DEFICIENCY: ICD-10-CM

## 2024-10-04 DIAGNOSIS — E78.2 MIXED HYPERLIPIDEMIA: ICD-10-CM

## 2024-10-04 DIAGNOSIS — Z01.89 ENCOUNTER FOR ROUTINE LABORATORY TESTING: ICD-10-CM

## 2024-10-04 DIAGNOSIS — E66.813 CLASS 3 SEVERE OBESITY WITH SERIOUS COMORBIDITY AND BODY MASS INDEX (BMI) OF 40.0 TO 44.9 IN ADULT, UNSPECIFIED OBESITY TYPE: ICD-10-CM

## 2024-10-04 DIAGNOSIS — N40.0 BENIGN PROSTATIC HYPERPLASIA WITHOUT LOWER URINARY TRACT SYMPTOMS: ICD-10-CM

## 2024-10-04 DIAGNOSIS — E11.9 TYPE 2 DIABETES MELLITUS WITHOUT COMPLICATION, WITHOUT LONG-TERM CURRENT USE OF INSULIN (MULTI): ICD-10-CM

## 2024-10-04 DIAGNOSIS — Z71.89 COUNSELING REGARDING ADVANCED CARE PLANNING AND GOALS OF CARE: ICD-10-CM

## 2024-10-04 DIAGNOSIS — F32.A DEPRESSION, UNSPECIFIED DEPRESSION TYPE: ICD-10-CM

## 2024-10-04 DIAGNOSIS — E06.3 HYPOTHYROIDISM DUE TO HASHIMOTO THYROIDITIS: ICD-10-CM

## 2024-10-04 DIAGNOSIS — Z87.442 HISTORY OF KIDNEY STONES: ICD-10-CM

## 2024-10-04 DIAGNOSIS — Z23 ENCOUNTER FOR IMMUNIZATION: ICD-10-CM

## 2024-10-04 DIAGNOSIS — G63 POLYNEUROPATHY ASSOCIATED WITH UNDERLYING DISEASE (MULTI): ICD-10-CM

## 2024-10-04 PROBLEM — F41.9 ANXIETY: Status: ACTIVE | Noted: 2024-10-04

## 2024-10-04 PROBLEM — E66.9 OBESITY: Status: ACTIVE | Noted: 2023-08-21

## 2024-10-04 PROCEDURE — 3048F LDL-C <100 MG/DL: CPT | Performed by: STUDENT IN AN ORGANIZED HEALTH CARE EDUCATION/TRAINING PROGRAM

## 2024-10-04 PROCEDURE — 99214 OFFICE O/P EST MOD 30 MIN: CPT | Performed by: STUDENT IN AN ORGANIZED HEALTH CARE EDUCATION/TRAINING PROGRAM

## 2024-10-04 PROCEDURE — 1036F TOBACCO NON-USER: CPT | Performed by: STUDENT IN AN ORGANIZED HEALTH CARE EDUCATION/TRAINING PROGRAM

## 2024-10-04 PROCEDURE — 1123F ACP DISCUSS/DSCN MKR DOCD: CPT | Performed by: STUDENT IN AN ORGANIZED HEALTH CARE EDUCATION/TRAINING PROGRAM

## 2024-10-04 PROCEDURE — 3074F SYST BP LT 130 MM HG: CPT | Performed by: STUDENT IN AN ORGANIZED HEALTH CARE EDUCATION/TRAINING PROGRAM

## 2024-10-04 PROCEDURE — 2033F EYE IMG VALID W/O RTNOPTHY: CPT | Performed by: STUDENT IN AN ORGANIZED HEALTH CARE EDUCATION/TRAINING PROGRAM

## 2024-10-04 PROCEDURE — 1160F RVW MEDS BY RX/DR IN RCRD: CPT | Performed by: STUDENT IN AN ORGANIZED HEALTH CARE EDUCATION/TRAINING PROGRAM

## 2024-10-04 PROCEDURE — 90677 PCV20 VACCINE IM: CPT | Performed by: STUDENT IN AN ORGANIZED HEALTH CARE EDUCATION/TRAINING PROGRAM

## 2024-10-04 PROCEDURE — G0439 PPPS, SUBSEQ VISIT: HCPCS | Performed by: STUDENT IN AN ORGANIZED HEALTH CARE EDUCATION/TRAINING PROGRAM

## 2024-10-04 PROCEDURE — 92229 IMG RTA DETC/MNTR DS POC ALY: CPT | Performed by: STUDENT IN AN ORGANIZED HEALTH CARE EDUCATION/TRAINING PROGRAM

## 2024-10-04 PROCEDURE — 3078F DIAST BP <80 MM HG: CPT | Performed by: STUDENT IN AN ORGANIZED HEALTH CARE EDUCATION/TRAINING PROGRAM

## 2024-10-04 PROCEDURE — 1170F FXNL STATUS ASSESSED: CPT | Performed by: STUDENT IN AN ORGANIZED HEALTH CARE EDUCATION/TRAINING PROGRAM

## 2024-10-04 PROCEDURE — 1159F MED LIST DOCD IN RCRD: CPT | Performed by: STUDENT IN AN ORGANIZED HEALTH CARE EDUCATION/TRAINING PROGRAM

## 2024-10-04 PROCEDURE — 3044F HG A1C LEVEL LT 7.0%: CPT | Performed by: STUDENT IN AN ORGANIZED HEALTH CARE EDUCATION/TRAINING PROGRAM

## 2024-10-04 PROCEDURE — 1157F ADVNC CARE PLAN IN RCRD: CPT | Performed by: STUDENT IN AN ORGANIZED HEALTH CARE EDUCATION/TRAINING PROGRAM

## 2024-10-04 PROCEDURE — 1126F AMNT PAIN NOTED NONE PRSNT: CPT | Performed by: STUDENT IN AN ORGANIZED HEALTH CARE EDUCATION/TRAINING PROGRAM

## 2024-10-04 PROCEDURE — 1158F ADVNC CARE PLAN TLK DOCD: CPT | Performed by: STUDENT IN AN ORGANIZED HEALTH CARE EDUCATION/TRAINING PROGRAM

## 2024-10-04 PROCEDURE — 99215 OFFICE O/P EST HI 40 MIN: CPT | Performed by: STUDENT IN AN ORGANIZED HEALTH CARE EDUCATION/TRAINING PROGRAM

## 2024-10-04 RX ORDER — PAROXETINE 10 MG/1
TABLET, FILM COATED ORAL
Qty: 49 TABLET | Refills: 0 | Status: SHIPPED | OUTPATIENT
Start: 2024-10-04 | End: 2024-11-01

## 2024-10-04 ASSESSMENT — ACTIVITIES OF DAILY LIVING (ADL)
DRESSING: INDEPENDENT
BATHING: INDEPENDENT
TAKING_MEDICATION: INDEPENDENT
DOING_HOUSEWORK: INDEPENDENT
MANAGING_FINANCES: INDEPENDENT
GROCERY_SHOPPING: INDEPENDENT

## 2024-10-04 ASSESSMENT — ENCOUNTER SYMPTOMS
GASTROINTESTINAL NEGATIVE: 1
RESPIRATORY NEGATIVE: 1
EYES NEGATIVE: 1
MUSCULOSKELETAL NEGATIVE: 1
HEMATOLOGIC/LYMPHATIC NEGATIVE: 1
CONSTITUTIONAL NEGATIVE: 1
PSYCHIATRIC NEGATIVE: 1
ENDOCRINE NEGATIVE: 1
NEUROLOGICAL NEGATIVE: 1
ALLERGIC/IMMUNOLOGIC NEGATIVE: 1
CARDIOVASCULAR NEGATIVE: 1

## 2024-10-04 ASSESSMENT — PAIN SCALES - GENERAL: PAINLEVEL: 0-NO PAIN

## 2024-10-04 NOTE — PATIENT INSTRUCTIONS
Discussed routine and/or preventative care with the patient as outlined below:  - Labwork:   - Patient had labwork done for this appointment. Discussed today. Everything looked great.   - Will order labwork for the patient's next appointment. Encouraged the patient to get this labwork done one week prior to the next appointment.  - Imaging:   - Colorectal Cancer: Patient will be due for this in 2025.  - Immunizations:   - Encouraged the patient to get the pneumonia (prevnar-20) immunization.  - Encouraged the tetanus (TDAP) booster.  - Discussed the RSV immunization.  - Discussed seasonal immunizations, including the influenza and COVID-19 immunizations.   - Significant medication and problem list reconciliation done today.   - Blood pressure at goal today.  - Encouraged continued diet, exercise, and lifestyle modification.  - Encouraged continued use of CPAP.  - Patient is on medication for his prostate cancer that is causing him to deal with significant hot flashes. Patient was given effexor for this without significant improvement, will attempt to transition him off of this and onto paroxetine, will reassess in one month with telemedicine appointment.   - Otherwise, the patient feels well and denies any acute symptoms / concerns at this time. Patient denies any issues with his memory.    ADVANCED CARE PLANNING  Advanced Care Planning was discussed with patient.  Encouraged the patient to confirm that Living Will and Healthcare Power of  (HCPoA) are accurate and up to date.  Encouraged the patient to confirm that our office be provided a copy of any documentation in the event that anything changes.

## 2024-10-04 NOTE — PROGRESS NOTES
[facility]: [facility]  MEDICARE WELLNESS EXAM      Jerald Sandoval is a 74 y.o. male that is presenting today for Annual Exam.    Assessment/Plan    Discussed routine and/or preventative care with the patient as outlined below:  - Labwork:   - Patient had labwork done for this appointment. Discussed today. Everything looked great.   - Will order labwork for the patient's next appointment. Encouraged the patient to get this labwork done one week prior to the next appointment.  - Imaging:   - Colorectal Cancer: Patient will be due for this in 2025.  - Immunizations:   - Encouraged the patient to get the pneumonia (prevnar-20) immunization.  - Encouraged the tetanus (TDAP) booster.  - Discussed the RSV immunization.  - Discussed seasonal immunizations, including the influenza and COVID-19 immunizations.   - Significant medication and problem list reconciliation done today.   - Blood pressure at goal today.  - Encouraged continued diet, exercise, and lifestyle modification.  - Encouraged continued use of CPAP.  - Patient is on medication for his prostate cancer that is causing him to deal with significant hot flashes. Patient was given effexor for this without significant improvement, will attempt to transition him off of this and onto paroxetine, will reassess in one month with telemedicine appointment.   - Otherwise, the patient feels well and denies any acute symptoms / concerns at this time. Patient denies any issues with his memory.    ADVANCED CARE PLANNING  Advanced Care Planning was discussed with patient.  Encouraged the patient to confirm that Living Will and Healthcare Power of  (HCPoA) are accurate and up to date.  Encouraged the patient to confirm that our office be provided a copy of any documentation in the event that anything changes.    Diagnoses and all orders for this visit:  Annual physical exam  -     Follow Up In Primary Care  Counseling regarding advanced care  planning and goals of care  Encounter for colorectal cancer screening  Encounter for routine laboratory testing  Encounter for immunization  -     Pneumococcal conjugate vaccine, 20-valent (PREVNAR 20)  Class 3 severe obesity with serious comorbidity and body mass index (BMI) of 40.0 to 44.9 in adult, unspecified obesity type  -     Follow Up In Primary Care; Future  Type 2 diabetes mellitus without complication, without long-term current use of insulin (Multi)  -     Diabetic Retinopathy Luminetics; Future  -     Hemoglobin A1C; Future  -     Albumin-Creatinine Ratio, Urine Random; Future  -     Follow Up In Primary Care; Future  Hypothyroidism due to Hashimoto thyroiditis  -     TSH with reflex to Free T4 if abnormal; Future  -     Follow Up In Primary Care; Future  Coronary artery disease involving native coronary artery of native heart without angina pectoris  -     Follow Up In Primary Care; Future  Benign prostatic hyperplasia without lower urinary tract symptoms  -     Follow Up In Primary Care; Future  Polyneuropathy associated with underlying disease (Multi)  -     Follow Up In Primary Care; Future  Gastroesophageal reflux disease without esophagitis  -     Follow Up In Primary Care; Future  Primary osteoarthritis involving multiple joints  -     Follow Up In Primary Care; Future  Depression, unspecified depression type  -     PARoxetine (Paxil) 10 mg tablet; Take 1 tablet (10 mg) by mouth once daily in the morning for 7 days, THEN 2 tablets (20 mg) once daily in the morning for 21 days.  -     Follow Up In Primary Care; Future  -     Follow Up In Primary Care; Future  TODD (obstructive sleep apnea)  -     Follow Up In Primary Care; Future  PUD (peptic ulcer disease)  -     Follow Up In Primary Care; Future  History of kidney stones  -     Follow Up In Primary Care; Future  Mixed hyperlipidemia  -     Hepatic Function Panel; Future  -     Lipid Panel; Future  -     Follow Up In Primary Care; Future  Primary  hypertension  -     CBC and Auto Differential; Future  -     Basic Metabolic Panel; Future  -     Follow Up In Primary Care; Future  Vitamin D deficiency  -     Vitamin D 25-Hydroxy,Total (for eval of Vitamin D levels); Future  -     Follow Up In Primary Care; Future  History of gout  -     Uric Acid; Future  -     Follow Up In Primary Care; Future  Hot flashes  -     PARoxetine (Paxil) 10 mg tablet; Take 1 tablet (10 mg) by mouth once daily in the morning for 7 days, THEN 2 tablets (20 mg) once daily in the morning for 21 days.  -     Follow Up In Primary Care; Future  -     Follow Up In Primary Care; Future    Current Outpatient Medications   Medication Instructions    acetaminophen (TYLENOL) 1,000 mg, oral, 3 times daily PRN    allopurinol (ZYLOPRIM) 300 mg, oral, Daily    aspirin 81 mg, oral, Daily    calcium carbonate-vitamin D3 500 mg-5 mcg (200 unit) tablet 2 tablets, oral, Daily    gabapentin (NEURONTIN) 300 mg, oral, Nightly    hydroCHLOROthiazide (MICROZIDE) 12.5 mg, oral, Daily    levothyroxine (SYNTHROID, LEVOXYL) 150 mcg, oral, Daily before breakfast    metFORMIN XR (GLUCOPHAGE-XR) 500 mg, oral, Daily with breakfast    NIFEdipine ER (ADALAT CC) 60 mg, oral, Daily with breakfast    omeprazole (PRILOSEC) 40 mg, oral, Every 48 hours    PARoxetine (Paxil) 10 mg tablet Take 1 tablet (10 mg) by mouth once daily in the morning for 7 days, THEN 2 tablets (20 mg) once daily in the morning for 21 days.    simvastatin (ZOCOR) 10 mg, oral, Daily     Subjective   - The patient otherwise feels well and denies any acute symptoms or concerns at this time.  - The patient denies any changes or progression of their chronic medical problems.  - The patient denies any problems or concerns with their medications.      Review of Systems   Constitutional: Negative.    HENT: Negative.     Eyes: Negative.    Respiratory: Negative.     Cardiovascular: Negative.    Gastrointestinal: Negative.    Endocrine: Negative.     Genitourinary: Negative.    Musculoskeletal: Negative.    Skin: Negative.    Allergic/Immunologic: Negative.    Neurological: Negative.    Hematological: Negative.    Psychiatric/Behavioral: Negative.     All other systems reviewed and are negative.    Objective   Vitals:    10/04/24 0846   BP: 123/73   Pulse: 66   Temp: 36.3 °C (97.4 °F)   SpO2: 97%      Body mass index is 41.69 kg/m².  Physical Exam  Vitals and nursing note reviewed.   Constitutional:       General: He is not in acute distress.     Appearance: Normal appearance. He is not ill-appearing.   HENT:      Head: Normocephalic and atraumatic.      Right Ear: Tympanic membrane, ear canal and external ear normal. There is no impacted cerumen.      Left Ear: Tympanic membrane, ear canal and external ear normal. There is no impacted cerumen.      Nose: Nose normal.      Mouth/Throat:      Mouth: Mucous membranes are moist.      Pharynx: Oropharynx is clear. No oropharyngeal exudate or posterior oropharyngeal erythema.   Eyes:      General: No scleral icterus.        Right eye: No discharge.         Left eye: No discharge.      Extraocular Movements: Extraocular movements intact.      Conjunctiva/sclera: Conjunctivae normal.      Pupils: Pupils are equal, round, and reactive to light.   Neck:      Vascular: No carotid bruit.   Cardiovascular:      Rate and Rhythm: Normal rate and regular rhythm.      Pulses: Normal pulses.      Heart sounds: Normal heart sounds. No murmur heard.     No friction rub. No gallop.   Pulmonary:      Effort: Pulmonary effort is normal. No respiratory distress.      Breath sounds: Normal breath sounds.   Abdominal:      General: Abdomen is flat. Bowel sounds are normal.      Palpations: Abdomen is soft.      Tenderness: There is no abdominal tenderness.      Hernia: No hernia is present.   Musculoskeletal:         General: No swelling. Normal range of motion.      Cervical back: Normal range of motion.   Lymphadenopathy:       "Cervical: No cervical adenopathy.   Skin:     General: Skin is warm and dry.      Coloration: Skin is not jaundiced.      Findings: No rash.   Neurological:      General: No focal deficit present.      Mental Status: He is alert and oriented to person, place, and time. Mental status is at baseline.   Psychiatric:         Mood and Affect: Mood normal.         Behavior: Behavior normal.       Diagnostic Results   Lab Results   Component Value Date    GLUCOSE 125 (H) 09/27/2024    CALCIUM 10.2 09/27/2024     09/27/2024    K 4.7 09/27/2024    CO2 30 09/27/2024     09/27/2024    BUN 20 09/27/2024    CREATININE 1.04 09/27/2024     Lab Results   Component Value Date    ALT 76 (H) 09/27/2024    AST 65 (H) 09/27/2024    ALKPHOS 97 09/27/2024    BILITOT 0.5 09/27/2024     Lab Results   Component Value Date    WBC 3.7 (L) 09/27/2024    HGB 14.1 09/27/2024    HCT 42.6 09/27/2024    MCV 96 09/27/2024     09/27/2024     Lab Results   Component Value Date    CHOL 131 03/14/2024    CHOL 158 09/14/2023    CHOL 146 03/10/2023     Lab Results   Component Value Date    HDL 35.7 03/14/2024    HDL 41 09/14/2023    HDL 39.4 (A) 03/10/2023     Lab Results   Component Value Date    LDLCALC 59 03/14/2024    LDLCALC 84 09/14/2023    LDLCALC 77 09/06/2022     Lab Results   Component Value Date    TRIG 183 (H) 03/14/2024    TRIG 165 (H) 09/14/2023    TRIG 225 (H) 03/10/2023     No components found for: \"CHOLHDL\"  Lab Results   Component Value Date    HGBA1C 6.4 (H) 09/27/2024     Other labs not included in the list above reviewed either before or during this encounter.    History   Past Medical History:   Diagnosis Date    Coronary artery disease     Diabetes mellitus (Multi)     \"pre-diabetic\" per pt    Gout     History of stomach ulcers     d/t daily aleve per pt    History of transcatheter aortic valve replacement (TAVR)     History of transcatheter aortic valve replacement (TAVR)     Hyperlipemia     Hypertension     " Hypothyroidism     LBBB (left bundle branch block)     Obstructive sleep apnea treated with continuous positive airway pressure (CPAP)     Personal history of irradiation     Personal history of irradiation     Prostate cancer (Multi)     Sleep apnea      Past Surgical History:   Procedure Laterality Date    AORTIC VALVE REPLACEMENT N/A     CARPAL TUNNEL RELEASE Bilateral     ROTATOR CUFF REPAIR Bilateral     THUMB AMPUTATION Left     d/t trauma     Family History   Problem Relation Name Age of Onset    Heart disease Mother      Lung cancer Father      Heart disease Father      Other (Other) Father          CAB x5    Lung cancer Brother      Heart attack Son      Heart attack Son       Social History     Socioeconomic History    Marital status:      Spouse name: Not on file    Number of children: Not on file    Years of education: Not on file    Highest education level: Not on file   Occupational History    Not on file   Tobacco Use    Smoking status: Never     Passive exposure: Past    Smokeless tobacco: Never   Vaping Use    Vaping status: Never Used   Substance and Sexual Activity    Alcohol use: Yes     Alcohol/week: 3.0 standard drinks of alcohol     Types: 1 Cans of beer, 2 Shots of liquor per week    Drug use: Never    Sexual activity: Defer   Other Topics Concern    Not on file   Social History Narrative    Not on file     Social Determinants of Health     Financial Resource Strain: Low Risk  (3/11/2024)    Overall Financial Resource Strain (CARDIA)     Difficulty of Paying Living Expenses: Not hard at all   Food Insecurity: Unknown (3/11/2024)    Hunger Vital Sign     Worried About Running Out of Food in the Last Year: Never true     Ran Out of Food in the Last Year: Not on file   Transportation Needs: No Transportation Needs (3/11/2024)    PRAPARE - Transportation     Lack of Transportation (Medical): No     Lack of Transportation (Non-Medical): No   Physical Activity: Not on file   Stress: No  "Stress Concern Present (3/11/2024)    [employer] - Occupational Stress Questionnaire     Feeling of Stress : Not at all   Social Connections: Not on file   Intimate Partner Violence: Not on file   Housing Stability: Unknown (3/11/2024)    Housing Stability Vital Sign     Unable to Pay for Housing in the Last Year: No     Number of Places Lived in the Last Year: Not on file     Unstable Housing in the Last Year: No     Allergies   Allergen Reactions    Cephalosporins Rash     \"Skin peels off\"    Penicillin Rash     \"Skin peels off\"     Current Outpatient Medications on File Prior to Visit   Medication Sig Dispense Refill    acetaminophen (Tylenol) 500 mg tablet Take 2 tablets (1,000 mg) by mouth 3 times a day as needed for mild pain (1 - 3), moderate pain (4 - 6) or fever (temp greater than 38.0 C).      allopurinol (Zyloprim) 300 mg tablet Take 1 tablet (300 mg) by mouth once daily. 90 tablet 3    aspirin 81 mg chewable tablet Chew 1 tablet (81 mg) once daily.      calcium carbonate-vitamin D3 500 mg-5 mcg (200 unit) tablet Take 2 tablets by mouth once daily.      gabapentin (Neurontin) 300 mg capsule Take 1 capsule (300 mg) by mouth once daily at bedtime. 90 capsule 3    hydroCHLOROthiazide (Microzide) 12.5 mg tablet Take 1 tablet (12.5 mg) by mouth once daily. 90 tablet 3    levothyroxine (Synthroid, Levoxyl) 150 mcg tablet Take 1 tablet (150 mcg) by mouth once daily in the morning. Take before meals. 90 tablet 3    metFORMIN  mg 24 hr tablet Take 1 tablet (500 mg) by mouth once daily with breakfast. 90 tablet 3    NIFEdipine ER (NIFEdipine CC) 60 mg 24 hr tablet Take 1 tablet (60 mg) by mouth once daily with breakfast. 90 tablet 3    omeprazole (PriLOSEC) 40 mg DR capsule Take 1 capsule (40 mg) by mouth every other day. 45 capsule 3    simvastatin (Zocor) 10 mg tablet Take 1 tablet (10 mg) by mouth once daily. 90 tablet 3    [DISCONTINUED] venlafaxine XR (Effexor XR) 37.5 mg 24 " hr capsule Take 1 capsule (37.5 mg) by mouth once daily. Do not crush or chew. 30 capsule 1     No current facility-administered medications on file prior to visit.     Immunization History   Administered Date(s) Administered    Flu vaccine, quadrivalent, high-dose, preservative free, age 65y+ (FLUZONE) 10/24/2021, 09/13/2023    Flu vaccine, trivalent, preservative free, HIGH-DOSE, age 65y+ (Fluzone) 10/05/2015, 11/29/2016, 09/07/2017, 09/14/2018, 10/01/2019    Flu vaccine, trivalent, preservative free, age 6 months and greater (Fluarix/Fluzone/Flulaval) 12/23/2013    Hepatitis B vaccine, 19 yrs and under (RECOMBIVAX, ENGERIX) 01/01/1988    Influenza, Seasonal, Quadrivalent, Adjuvanted 10/05/2020, 09/27/2022    Influenza, seasonal, injectable 12/17/2012, 10/16/2013, 10/05/2015    Influenza, trivalent, adjuvanted 09/28/2024    Pfizer Purple Cap SARS-CoV-2 03/11/2021, 04/08/2021, 12/08/2021    Pneumococcal conjugate vaccine, 13-valent (PREVNAR 13) 11/29/2016, 07/05/2018    Td vaccine, age 7 years and older (TDVAX) 10/01/2008    Zoster vaccine, recombinant, adult (SHINGRIX) 09/18/2023, 11/18/2023    Zoster, live 02/17/2017     Patient's medical history was reviewed and updated either before or during this encounter.     Jimmie Rogers MD

## 2024-10-07 ENCOUNTER — APPOINTMENT (OUTPATIENT)
Dept: UROLOGY | Facility: CLINIC | Age: 74
End: 2024-10-07
Payer: MEDICARE

## 2024-10-30 ENCOUNTER — TELEPHONE (OUTPATIENT)
Dept: CARDIAC SURGERY | Facility: CLINIC | Age: 74
End: 2024-10-30
Payer: MEDICARE

## 2024-11-25 NOTE — PROGRESS NOTES
Subjective      Chief Complaint   Patient presents with    6 month follow up            He had aortic valve replacement by TAVR in February 2023  Is feeling well and had the echo in Feb and the gradient is low.   He is doing well and is active.  He is not complaining of chest discomfort.  NO PND or orthopnea.  The legs are swelling on him.  He does not complain of palpitations. The legs go  down at night and uses compressions socks  The BP is doing well           Review of Systems   Constitutional: Negative. Negative for chills and fever.   HENT: Negative.     Eyes: Negative.    Cardiovascular:  Negative for dyspnea on exertion and irregular heartbeat.   Respiratory: Negative.  Negative for cough.    Endocrine: Negative.    Skin: Negative.    Musculoskeletal: Negative.  Negative for falls.   Gastrointestinal: Negative.    Genitourinary: Negative.    Neurological: Negative.    All other systems reviewed and are negative.       Past Surgical History:   Procedure Laterality Date    AORTIC VALVE REPLACEMENT N/A     CARDIAC CATHETERIZATION  1/10/2023    CARPAL TUNNEL RELEASE Bilateral     ROTATOR CUFF REPAIR Bilateral     THUMB AMPUTATION Left     d/t trauma        Active Ambulatory Problems     Diagnosis Date Noted    BPH (benign prostatic hyperplasia) 08/21/2023    T2DM (type 2 diabetes mellitus) (Multi) 08/21/2023    HTN (hypertension) 08/21/2023    GERD (gastroesophageal reflux disease) 08/21/2023    History of gout 08/21/2023    History of colonic polyps 08/21/2023    Hypothyroidism 08/21/2023    Macular degeneration 08/21/2023    HLD (hyperlipidemia) 08/21/2023    Obesity 08/21/2023    Peripheral neuropathy 08/21/2023    OA (osteoarthritis) 08/21/2023    Valvular heart disease 08/21/2023    History of transcatheter aortic valve replacement (TAVR) 11/27/2023    PUD (peptic ulcer disease) 12/29/2023    CAD (coronary artery disease) 01/10/2023    Malignant neoplasm of prostate (Multi) 02/09/2024    History of  malignant neoplasm of skin 02/09/2024    Vitamin D deficiency 02/09/2024    TODD (obstructive sleep apnea) 01/10/2023    LBBB (left bundle branch block) 02/18/2023    History of kidney stones 03/21/2024    Small vessel disease, cerebrovascular 05/22/2024    Depression 10/04/2024    Anxiety 10/04/2024    Hot flashes 10/04/2024     Resolved Ambulatory Problems     Diagnosis Date Noted    Aortic stenosis 08/21/2023    Arthritis 08/21/2023    Dysphagia 08/21/2023    Hyperglycemia due to type 2 diabetes mellitus (Multi) 08/21/2023    Allergy status to other drugs, medicaments and biological substances 01/10/2023    Body mass index (BMI) 45.0-49.9, adult (Multi) 02/18/2023    Arthritis of left knee 02/09/2024    Unspecified tear of unspecified meniscus, current injury, left knee, initial encounter 02/09/2024    High prostate specific antigen (PSA) 10/16/2023    Fatigue 02/09/2024    Dizziness and giddiness 02/09/2024    Primary hypertension 02/12/2024    Pain in left knee 06/29/2023    Other postprocedural cardiac functional disturbances following cardiac surgery 02/18/2023    Other specified diseases of pancreas 02/08/2023    Other diseases of spleen 02/08/2023    Long term (current) use of oral hypoglycemic drugs 01/10/2023    Morbid (severe) obesity due to excess calories (Multi) 02/18/2023    Long term (current) use of aspirin 01/10/2023    Calculus of kidney 02/08/2023     Past Medical History:   Diagnosis Date    Coronary artery disease     Diabetes mellitus (Multi)     Gout     Heart valve disease     History of stomach ulcers     Hyperlipemia     Hypertension     Obstructive sleep apnea treated with continuous positive airway pressure (CPAP)     Personal history of irradiation     Personal history of irradiation     Prostate cancer (Multi)     Sleep apnea         Visit Vitals  /71   Pulse 78   Wt 139 kg (306 lb)   SpO2 96%   BMI 44.30 kg/m²   Smoking Status Never   BSA 2.61 m²        Objective  "    Constitutional:       Appearance: Healthy appearance.   Eyes:      Pupils: Pupils are equal, round, and reactive to light.   Neck:      Vascular: No JVR. JVD normal.   Pulmonary:      Effort: Pulmonary effort is normal.      Breath sounds: Normal breath sounds.   Cardiovascular:      PMI at left midclavicular line. Normal rate. Regular rhythm. Normal S1. Normal S2.       Murmurs: There is a grade 1/6 mid to late systolic murmur.      No gallop.  No click. No rub.   Pulses:     Intact distal pulses.   Edema:     Peripheral edema absent.   Abdominal:      Palpations: Abdomen is soft.      Tenderness: There is no abdominal tenderness.   Musculoskeletal: Normal range of motion.      Extremities: No clubbing present.Skin:     General: Skin is warm and dry.   Neurological:      General: No focal deficit present.            Lab Review:       Lab Results   Component Value Date    CHOL 131 03/14/2024    CHOL 158 09/14/2023    CHOL 146 03/10/2023     Lab Results   Component Value Date    HDL 35.7 03/14/2024    HDL 41 09/14/2023    HDL 39.4 (A) 03/10/2023     Lab Results   Component Value Date    LDLCALC 59 03/14/2024    LDLCALC 84 09/14/2023    LDLCALC 77 09/06/2022     Lab Results   Component Value Date    TRIG 183 (H) 03/14/2024    TRIG 165 (H) 09/14/2023    TRIG 225 (H) 03/10/2023     No components found for: \"CHOLHDL\"     Assessment/Plan     Valvular heart disease  He is about 2 years from the TAVR and is doing well.  NO angina and no chf.  Thelast echo shows the valve is doing well    HTN (hypertension)  The BP is doing well    HLD (hyperlipidemia)  Is controlled    CAD (coronary artery disease)  No angina and is active.     "

## 2024-11-27 ENCOUNTER — OFFICE VISIT (OUTPATIENT)
Dept: CARDIOLOGY | Facility: CLINIC | Age: 74
End: 2024-11-27
Payer: MEDICARE

## 2024-11-27 VITALS
OXYGEN SATURATION: 96 % | SYSTOLIC BLOOD PRESSURE: 118 MMHG | BODY MASS INDEX: 44.3 KG/M2 | DIASTOLIC BLOOD PRESSURE: 71 MMHG | HEART RATE: 78 BPM | WEIGHT: 306 LBS

## 2024-11-27 DIAGNOSIS — E78.2 MIXED HYPERLIPIDEMIA: ICD-10-CM

## 2024-11-27 DIAGNOSIS — I10 PRIMARY HYPERTENSION: ICD-10-CM

## 2024-11-27 DIAGNOSIS — I38 VALVULAR HEART DISEASE: Primary | ICD-10-CM

## 2024-11-27 DIAGNOSIS — I25.10 CORONARY ARTERY DISEASE INVOLVING NATIVE CORONARY ARTERY OF NATIVE HEART WITHOUT ANGINA PECTORIS: ICD-10-CM

## 2024-11-27 PROCEDURE — 99213 OFFICE O/P EST LOW 20 MIN: CPT | Performed by: INTERNAL MEDICINE

## 2024-11-27 PROCEDURE — 1126F AMNT PAIN NOTED NONE PRSNT: CPT | Performed by: INTERNAL MEDICINE

## 2024-11-27 PROCEDURE — 1036F TOBACCO NON-USER: CPT | Performed by: INTERNAL MEDICINE

## 2024-11-27 PROCEDURE — 1159F MED LIST DOCD IN RCRD: CPT | Performed by: INTERNAL MEDICINE

## 2024-11-27 PROCEDURE — 3044F HG A1C LEVEL LT 7.0%: CPT | Performed by: INTERNAL MEDICINE

## 2024-11-27 PROCEDURE — 3078F DIAST BP <80 MM HG: CPT | Performed by: INTERNAL MEDICINE

## 2024-11-27 PROCEDURE — 3074F SYST BP LT 130 MM HG: CPT | Performed by: INTERNAL MEDICINE

## 2024-11-27 PROCEDURE — 1157F ADVNC CARE PLAN IN RCRD: CPT | Performed by: INTERNAL MEDICINE

## 2024-11-27 PROCEDURE — 3048F LDL-C <100 MG/DL: CPT | Performed by: INTERNAL MEDICINE

## 2024-11-27 ASSESSMENT — ENCOUNTER SYMPTOMS
CHILLS: 0
OCCASIONAL FEELINGS OF UNSTEADINESS: 1
IRREGULAR HEARTBEAT: 0
NEUROLOGICAL NEGATIVE: 1
LOSS OF SENSATION IN FEET: 1
MUSCULOSKELETAL NEGATIVE: 1
CONSTITUTIONAL NEGATIVE: 1
FALLS: 0
DYSPNEA ON EXERTION: 0
EYES NEGATIVE: 1
RESPIRATORY NEGATIVE: 1
ENDOCRINE NEGATIVE: 1
FEVER: 0
COUGH: 0
GASTROINTESTINAL NEGATIVE: 1
DEPRESSION: 0

## 2024-11-27 ASSESSMENT — PAIN SCALES - GENERAL: PAINLEVEL_OUTOF10: 0-NO PAIN

## 2024-11-27 NOTE — ASSESSMENT & PLAN NOTE
He is about 2 years from the TAVR and is doing well.  NO angina and no chf.  Thelast echo shows the valve is doing well

## 2024-12-14 NOTE — PROGRESS NOTES
"  Patient is a 74 y.o. male presenting for follow up of his history of prostate cancer and new urinary urgency.    SUBJECTIVE:  HPI   He has history of [place] 7 prostate cancer which was treated with radiation which finished in March 2024. He reports his hot flashes have resolved but he is still experiencing low libido.    He has had urinary frequency after radiation. He is not bothered much by urinary frequency at this time but is now experiencing urinary urgency that is new since his last visit.      No results found for: \"URINECULTURE\"     Past Medical History:   Diagnosis Date    Coronary artery disease     Diabetes mellitus (Multi)     \"pre-diabetic\" per pt    Gout     Heart valve disease     heart valve replacement in February 2023    History of stomach ulcers     d/t daily aleve per pt    History of transcatheter aortic valve replacement (TAVR)     History of transcatheter aortic valve replacement (TAVR)     Hyperlipemia     Hypertension     Hypothyroidism     LBBB (left bundle branch block)     Obstructive sleep apnea treated with continuous positive airway pressure (CPAP)     Personal history of irradiation     Personal history of irradiation     Prostate cancer (Multi)     Sleep apnea       Past Surgical History:   Procedure Laterality Date    AORTIC VALVE REPLACEMENT N/A     CARDIAC CATHETERIZATION  1/10/2023    CARPAL TUNNEL RELEASE Bilateral     ROTATOR CUFF REPAIR Bilateral     THUMB AMPUTATION Left     d/t trauma      Family History   Problem Relation Name Age of Onset    Heart disease Mother LEONIDAS Sandoval     Hypertension Mother LEONIDAS Sandoval     Lung cancer Father Lucio Sandoval     Heart disease Father Lucio Sandoval     Other (Other) Father Lucio Sandoval         CAB x5    Cancer Father Lucio Sandoval     Lung cancer Brother Lucio Sandoval     Cancer Brother Lucio Sandoval     Heart attack Son      Heart attack Son        Social History     Socioeconomic History    Marital status: "    Tobacco Use    Smoking status: Never     Passive exposure: Past    Smokeless tobacco: Never   Vaping Use    Vaping status: Never Used   Substance and Sexual Activity    Alcohol use: Yes     Alcohol/week: 3.0 standard drinks of alcohol     Types: 1 Cans of beer, 1 Shots of liquor, 1 Standard drinks or equivalent per week     Comment: Social; not every week    Drug use: Never    Sexual activity: Not Currently     Partners: Female     Social Drivers of Health     Financial Resource Strain: Low Risk  (3/11/2024)    Overall Financial Resource Strain (CARDIA)     Difficulty of Paying Living Expenses: Not hard at all   Food Insecurity: Unknown (3/11/2024)    Hunger Vital Sign     Worried About Running Out of Food in the Last Year: Never true   Transportation Needs: No Transportation Needs (3/11/2024)    PRAPARE - Transportation     Lack of Transportation (Medical): No     Lack of Transportation (Non-Medical): No   Stress: No Stress Concern Present (3/11/2024)    [employer] - Occupational Stress Questionnaire     Feeling of Stress : Not at all   Housing Stability: Unknown (3/11/2024)    Housing Stability Vital Sign     Unable to Pay for Housing in the Last Year: No     Unstable Housing in the Last Year: No      OBJECTIVE:  Visit Vitals  Temp 36.3 °C (97.3 °F)     Physical Exam   Constitutional: No obvious distress.  Eyes: Non-injected conjunctiva, sclera clear, EOMI.  Ears/Nose/Mouth/Throat: No obvious drainage per ears or nose.  Cardiovascular: Extremities are warm and well perfused. No edema, cyanosis or pallor.  Respiratory: No audible wheezing/stridor; respirations do not appear labored.  Gastrointestinal: Abdomen soft, not distended.  Musculoskeletal: Normal ROM of extremities.  Skin: No obvious rashes or open sores.  Neurologic: Alert and oriented, CN 2-12 grossly intact.  Psychiatric: Answers questions appropriately with normal affect.  Hematologic/Lymphatic/Immunologic: No  "obvious bruises or sites of spontaneous bleeding.  Genitourinary: No CVA tenderness, bladder not palpable. No inguinal lymphadenopathy identified. No scrotal or penile lesions.     Labs:  Lab Results   Component Value Date    WBC 3.7 (L) 09/27/2024    HGB 14.1 09/27/2024    HCT 42.6 09/27/2024     09/27/2024    CHOL 131 03/14/2024    TRIG 183 (H) 03/14/2024    HDL 35.7 03/14/2024    ALT 76 (H) 09/27/2024    AST 65 (H) 09/27/2024     09/27/2024    K 4.7 09/27/2024     09/27/2024    CREATININE 1.04 09/27/2024    BUN 20 09/27/2024    CO2 30 09/27/2024    TSH 3.76 09/27/2024    PSA <0.10 09/16/2024    INR 1.0 02/22/2023    HGBA1C 6.4 (H) 09/27/2024    ALBUR 12 09/14/2023     No results found for: \"KPSAT\", \"KPSAP\"  IMAGING:  NM PET CT prostate  Result date: 1/30/2024  IMPRESSION:  1.    Focally increased PSMA expression within the  prostate gland  bilaterally, right more than left, consistent with the known primary  prostatic malignancy.  2.     Mild hypermetabolic activity within small bilateral inguinal  lymph nodes, potentially early metastatic disease; correlate  clinically and follow-up as needed. No enlarged lymph nodes seen.  3.     There is no evidence for  PSMA-avid metastatic disease in the  neck, chest and abdomen or in the osseous structures.    PROCEDURES:  PVR 0 mL    ASSESSMENT/PLAN:  Problem List Items Addressed This Visit       Malignant neoplasm of prostate (Multi)    Urinary symptom or sign - Primary    Relevant Orders    Measure post void residual (Completed)    POCT UA Automated manually resulted (Completed)    Urinary urgency      He has history of [place] 7 prostate cancer treated with radiation which finished in March 2024. His PSA was 6.1, and prostate volume was 28g. He received one dose of ADT. He had mild pelvic activity on PMSA scan. His last PSA was undetectable in September 2024. PSA will be repeated.      He has history of urinary frequency since radiation which has now " improved, but he is now experiencing urinary urgency. Myrbetric will be prescribed. Adverse reactions and dosage reviewed.     UA was negative today.    He will follow up in 6 months.    All questions were answered to the patient’s satisfaction.  Patient agrees with the plan and wishes to proceed.  Follow-up will be scheduled appropriately.     Scribe Attestation  By signing my name below, I, Yamel Colby, Cecilyibe,   attest that this documentation has been prepared under the direction and in the presence of Jeremi Gonzalez MD.

## 2024-12-16 ENCOUNTER — APPOINTMENT (OUTPATIENT)
Dept: HEMATOLOGY/ONCOLOGY | Facility: CLINIC | Age: 74
End: 2024-12-16
Payer: MEDICARE

## 2024-12-16 ENCOUNTER — APPOINTMENT (OUTPATIENT)
Dept: UROLOGY | Facility: CLINIC | Age: 74
End: 2024-12-16
Payer: MEDICARE

## 2024-12-16 VITALS — BODY MASS INDEX: 44.44 KG/M2 | TEMPERATURE: 97.3 F | WEIGHT: 307 LBS

## 2024-12-16 DIAGNOSIS — C61 MALIGNANT NEOPLASM OF PROSTATE (MULTI): ICD-10-CM

## 2024-12-16 DIAGNOSIS — R39.15 URINARY URGENCY: ICD-10-CM

## 2024-12-16 DIAGNOSIS — R35.0 URINARY FREQUENCY: ICD-10-CM

## 2024-12-16 DIAGNOSIS — R39.9 URINARY SYMPTOM OR SIGN: Primary | ICD-10-CM

## 2024-12-16 LAB
POC APPEARANCE, URINE: CLEAR
POC BILIRUBIN, URINE: NEGATIVE
POC BLOOD, URINE: NEGATIVE
POC COLOR, URINE: YELLOW
POC GLUCOSE, URINE: NEGATIVE MG/DL
POC KETONES, URINE: NEGATIVE MG/DL
POC LEUKOCYTES, URINE: NEGATIVE
POC NITRITE,URINE: NEGATIVE
POC PH, URINE: 5.5 PH
POC PROTEIN, URINE: NEGATIVE MG/DL
POC SPECIFIC GRAVITY, URINE: 1.01
POC UROBILINOGEN, URINE: 0.2 EU/DL

## 2024-12-16 PROCEDURE — G2211 COMPLEX E/M VISIT ADD ON: HCPCS | Performed by: UROLOGY

## 2024-12-16 PROCEDURE — 1159F MED LIST DOCD IN RCRD: CPT | Performed by: UROLOGY

## 2024-12-16 PROCEDURE — 99214 OFFICE O/P EST MOD 30 MIN: CPT | Performed by: UROLOGY

## 2024-12-16 PROCEDURE — 3044F HG A1C LEVEL LT 7.0%: CPT | Performed by: UROLOGY

## 2024-12-16 PROCEDURE — 1036F TOBACCO NON-USER: CPT | Performed by: UROLOGY

## 2024-12-16 PROCEDURE — 3048F LDL-C <100 MG/DL: CPT | Performed by: UROLOGY

## 2024-12-16 PROCEDURE — 1157F ADVNC CARE PLAN IN RCRD: CPT | Performed by: UROLOGY

## 2024-12-16 PROCEDURE — 1126F AMNT PAIN NOTED NONE PRSNT: CPT | Performed by: UROLOGY

## 2024-12-16 PROCEDURE — 81003 URINALYSIS AUTO W/O SCOPE: CPT | Performed by: UROLOGY

## 2024-12-16 RX ORDER — MIRABEGRON 50 MG/1
50 TABLET, FILM COATED, EXTENDED RELEASE ORAL DAILY
Qty: 30 TABLET | Refills: 11 | Status: SHIPPED | OUTPATIENT
Start: 2024-12-16 | End: 2025-12-16

## 2024-12-16 ASSESSMENT — PAIN SCALES - GENERAL: PAINLEVEL_OUTOF10: 0-NO PAIN

## 2024-12-23 ENCOUNTER — LAB (OUTPATIENT)
Dept: LAB | Facility: LAB | Age: 74
End: 2024-12-23
Payer: MEDICARE

## 2024-12-23 DIAGNOSIS — C61 PROSTATE CANCER (MULTI): ICD-10-CM

## 2024-12-23 DIAGNOSIS — C61 MALIGNANT NEOPLASM OF PROSTATE (MULTI): ICD-10-CM

## 2024-12-23 LAB
ALBUMIN SERPL BCP-MCNC: 4.5 G/DL (ref 3.4–5)
ALP SERPL-CCNC: 99 U/L (ref 33–136)
ALT SERPL W P-5'-P-CCNC: 60 U/L (ref 10–52)
ANION GAP SERPL CALC-SCNC: 11 MMOL/L (ref 10–20)
AST SERPL W P-5'-P-CCNC: 55 U/L (ref 9–39)
BASOPHILS # BLD AUTO: 0.06 X10*3/UL (ref 0–0.1)
BASOPHILS NFR BLD AUTO: 1.2 %
BILIRUB SERPL-MCNC: 0.5 MG/DL (ref 0–1.2)
BUN SERPL-MCNC: 15 MG/DL (ref 6–23)
CALCIUM SERPL-MCNC: 9.8 MG/DL (ref 8.6–10.3)
CHLORIDE SERPL-SCNC: 103 MMOL/L (ref 98–107)
CO2 SERPL-SCNC: 30 MMOL/L (ref 21–32)
CREAT SERPL-MCNC: 1.19 MG/DL (ref 0.5–1.3)
EGFRCR SERPLBLD CKD-EPI 2021: 64 ML/MIN/1.73M*2
EOSINOPHIL # BLD AUTO: 0.2 X10*3/UL (ref 0–0.4)
EOSINOPHIL NFR BLD AUTO: 4.1 %
ERYTHROCYTE [DISTWIDTH] IN BLOOD BY AUTOMATED COUNT: 14 % (ref 11.5–14.5)
GLUCOSE SERPL-MCNC: 111 MG/DL (ref 74–99)
HCT VFR BLD AUTO: 45.9 % (ref 41–52)
HGB BLD-MCNC: 15 G/DL (ref 13.5–17.5)
IMM GRANULOCYTES # BLD AUTO: 0.01 X10*3/UL (ref 0–0.5)
IMM GRANULOCYTES NFR BLD AUTO: 0.2 % (ref 0–0.9)
LYMPHOCYTES # BLD AUTO: 1.02 X10*3/UL (ref 0.8–3)
LYMPHOCYTES NFR BLD AUTO: 20.9 %
MCH RBC QN AUTO: 30.5 PG (ref 26–34)
MCHC RBC AUTO-ENTMCNC: 32.7 G/DL (ref 32–36)
MCV RBC AUTO: 93 FL (ref 80–100)
MONOCYTES # BLD AUTO: 0.62 X10*3/UL (ref 0.05–0.8)
MONOCYTES NFR BLD AUTO: 12.7 %
NEUTROPHILS # BLD AUTO: 2.97 X10*3/UL (ref 1.6–5.5)
NEUTROPHILS NFR BLD AUTO: 60.9 %
NRBC BLD-RTO: 0 /100 WBCS (ref 0–0)
PLATELET # BLD AUTO: 201 X10*3/UL (ref 150–450)
POTASSIUM SERPL-SCNC: 4.2 MMOL/L (ref 3.5–5.3)
PROT SERPL-MCNC: 7.2 G/DL (ref 6.4–8.2)
PSA SERPL-MCNC: <0.1 NG/ML
RBC # BLD AUTO: 4.92 X10*6/UL (ref 4.5–5.9)
SODIUM SERPL-SCNC: 140 MMOL/L (ref 136–145)
TESTOST SERPL-MCNC: 273 NG/DL (ref 240–1000)
WBC # BLD AUTO: 4.9 X10*3/UL (ref 4.4–11.3)

## 2024-12-23 PROCEDURE — 85025 COMPLETE CBC W/AUTO DIFF WBC: CPT

## 2024-12-23 PROCEDURE — 84153 ASSAY OF PSA TOTAL: CPT

## 2024-12-23 PROCEDURE — 84403 ASSAY OF TOTAL TESTOSTERONE: CPT

## 2024-12-23 PROCEDURE — 80053 COMPREHEN METABOLIC PANEL: CPT

## 2024-12-24 ENCOUNTER — TELEPHONE (OUTPATIENT)
Dept: HEMATOLOGY/ONCOLOGY | Facility: CLINIC | Age: 74
End: 2024-12-24
Payer: MEDICARE

## 2024-12-24 NOTE — TELEPHONE ENCOUNTER
Discussed in detail ongoing issues with borderline liver function tests elevation recommend follow-up with primary care physician otherwise PSA continues to remain stable.  He will contact us for any other new complaints.

## 2025-01-05 NOTE — PROGRESS NOTES
Oncology Visit     Primary Care Provider: MD Jeremi Espinoza N Rad onc    Cancer History  Prostate Cancer - Unfav Intd  Treatment  -elevated PSA, I PSA 6.63, MRI 11/23 - lesion in right transition zone, right peripheral zone, non specific focus in left peripheral zone, Biopsy 12/29/23 -lesions 3+4 equal 7 grade group 2 involving 4 cores, [place]'s 4+3 grade group 3 involving 3 cores, PET PSMA -focally increased expression within the prostate gland right greater than left mild hypermetabolic activity within small bilateral inguinal lymph nodes SUV 3.1  / no other mets,reviewed in TB and inguinal nodes thought reactive not c/w mets  -refused trial, and decision for eligard 6m 3/2024  -XRT to prostate completed 3/29/24    Other contributing history  TAVR, HLD, HTN,  DM, Gout, Hypothyroid, ED visit 1/24 - MRI possible NPH , Basal Cell carcinoma, hot flashes    HPI  The patient did have recent follow-up with his primary care physician and did have some recent changes in his medications.  Denies any other major new complaints.  Hot flashes have improved.  Denies any issues with nausea, vomiting, fevers, chills, easy bruising or bleeding denies any chest pain or chest tightness.  Denies any other major new complaints.    ROS:  10-pt ROS reviewed and negative except as mentioned above.    Medications: below was reviewed and is accurate  Current Outpatient Medications   Medication Instructions    acetaminophen (TYLENOL) 1,000 mg, oral, 3 times daily PRN    allopurinol (ZYLOPRIM) 300 mg, oral, Daily    aspirin 81 mg, oral, Daily    calcium carbonate-vitamin D3 500 mg-5 mcg (200 unit) tablet 2 tablets, Daily    gabapentin (NEURONTIN) 300 mg, oral, Nightly    hydroCHLOROthiazide (MICROZIDE) 12.5 mg, oral, Daily    levothyroxine (SYNTHROID, LEVOXYL) 150 mcg, oral, Daily before breakfast    metFORMIN XR (GLUCOPHAGE-XR) 500 mg, oral, Daily with breakfast     mirabegron (MYRBETRIQ) 50 mg, oral, Daily    NIFEdipine ER (ADALAT CC) 60 mg, oral, Daily with breakfast    omeprazole (PRILOSEC) 40 mg, oral, Every 48 hours    PARoxetine (PAXIL) 20 mg, oral, Every morning    simvastatin (ZOCOR) 10 mg, oral, Daily        Past Medical/ Social / Surgical / Family history reviewed    Physical exam:  Vitals:    01/06/25 1114   BP: 150/85   BP Location: Left arm   Patient Position: Sitting   BP Cuff Size: Adult long   Pulse: 76   Resp: 16   Temp: 36.3 °C (97.3 °F)   TempSrc: Temporal   SpO2: 96%   Weight: 138 kg (305 lb 0.1 oz)         GEN: NAD, well-appearing  HEENT: no clear lesions seen  SKIN: no concerning new lesions, some areas of well-healed surgical scars and keratoses appreciated - stable  LUNGS: CTA 2/6 HSM  CV: RRR no clear R/G  ABD: Soft, NTND. No rebound or guarding.  EXT: Symmetric lower extremity edema - minimal improvement  NEURO: Grossly intact. No focal deficits.  PSYCH: Normal mood and affect    Radiology review  Reviewed in detail personally and for detail see results in EPIC        Genomics Data    Laboratory review  Reviewed in detail personally and for detail see results in EPIC  Lab Results   Component Value Date    WBC 4.9 12/23/2024    HGB 15.0 12/23/2024    HCT 45.9 12/23/2024    MCV 93 12/23/2024     12/23/2024     Lab Results   Component Value Date    GLUCOSE 111 (H) 12/23/2024    CALCIUM 9.8 12/23/2024     12/23/2024    K 4.2 12/23/2024    CO2 30 12/23/2024     12/23/2024    BUN 15 12/23/2024    CREATININE 1.19 12/23/2024     Lab Results   Component Value Date    PSA <0.10 12/23/2024    PSA <0.10 09/16/2024    PSA <0.10 06/03/2024     Lab Results   Component Value Date    ALT 60 (H) 12/23/2024    AST 55 (H) 12/23/2024    ALKPHOS 99 12/23/2024    BILITOT 0.5 12/23/2024     Lab Results   Component Value Date    TESTOSTERONE 273 12/23/2024         ASSESSMENT AND PLAN     Prostate Cancer -s/p ADT 6months, and s/p XRT, continues to have  suppression of PSA and recovery of testosterone at this point continue to monitor will arrange follow-up in 3 months continue to contact us with any other new complaints.  Hot flashes-see above testosterone has recovered improved  Borderline liver function test-at this point continues to remain elevated discussed further workup including referral to hepatology follow-up with primary care physician unclear if related to statin role of ultrasound for now they wanted to hold off on further evaluation and just arrange follow-up repeat in 3 months and if continues to remain elevated will refer for further workup.  Unclear if related to potential fatty liver.  Discussed the role of exercise and diet      Stan Mahmood MD  Senior Attending Physician/  in Medicine [facility] [employer]  [facility] / [facility]  Patient line [phone]  Fax [phone]

## 2025-01-06 ENCOUNTER — OFFICE VISIT (OUTPATIENT)
Dept: HEMATOLOGY/ONCOLOGY | Facility: CLINIC | Age: 75
End: 2025-01-06
Payer: MEDICARE

## 2025-01-06 VITALS
DIASTOLIC BLOOD PRESSURE: 85 MMHG | RESPIRATION RATE: 16 BRPM | SYSTOLIC BLOOD PRESSURE: 150 MMHG | OXYGEN SATURATION: 96 % | TEMPERATURE: 97.3 F | BODY MASS INDEX: 44.15 KG/M2 | WEIGHT: 305.01 LBS | HEART RATE: 76 BPM

## 2025-01-06 DIAGNOSIS — C61 PROSTATE CANCER (MULTI): ICD-10-CM

## 2025-01-06 DIAGNOSIS — C61 MALIGNANT NEOPLASM OF PROSTATE (MULTI): ICD-10-CM

## 2025-01-06 PROCEDURE — 99214 OFFICE O/P EST MOD 30 MIN: CPT | Performed by: INTERNAL MEDICINE

## 2025-01-06 PROCEDURE — 1159F MED LIST DOCD IN RCRD: CPT | Performed by: INTERNAL MEDICINE

## 2025-01-06 PROCEDURE — 1125F AMNT PAIN NOTED PAIN PRSNT: CPT | Performed by: INTERNAL MEDICINE

## 2025-01-06 PROCEDURE — 3077F SYST BP >= 140 MM HG: CPT | Performed by: INTERNAL MEDICINE

## 2025-01-06 PROCEDURE — 3079F DIAST BP 80-89 MM HG: CPT | Performed by: INTERNAL MEDICINE

## 2025-01-06 PROCEDURE — 1157F ADVNC CARE PLAN IN RCRD: CPT | Performed by: INTERNAL MEDICINE

## 2025-01-06 ASSESSMENT — PAIN SCALES - GENERAL: PAINLEVEL_OUTOF10: 3

## 2025-01-06 NOTE — PROGRESS NOTES
Patient here for follow up visit.  No concerns or complaints noted at this time.   Patient here with wife Kelsey.  Fall last Friday slid in the parking lot in [place]. Fell on left Knee area is ok but sore.     Hot flashes have greatly improved.   Noting weight gain in abdomen and breast area. Working out.   Going to [facility] in a month.     Medications and Allergies reviewed and reconciled this visit.    Follow up per MD request.    Patient reports availability and use of mychart, Reviewed this is a good place to communicate with the team as well as review labs and upcoming orders.      No barriers to education noted, patient agrees to current plan and verbalized understanding using teach back method.

## 2025-01-16 DIAGNOSIS — C61 PROSTATE CANCER (MULTI): ICD-10-CM

## 2025-01-16 RX ORDER — GABAPENTIN 300 MG/1
300 CAPSULE ORAL NIGHTLY
Qty: 90 CAPSULE | Refills: 3 | Status: SHIPPED | OUTPATIENT
Start: 2025-01-16 | End: 2026-01-16

## 2025-03-05 DIAGNOSIS — I10 PRIMARY HYPERTENSION: ICD-10-CM

## 2025-03-05 RX ORDER — HYDROCHLOROTHIAZIDE 12.5 MG/1
12.5 TABLET ORAL DAILY
Qty: 90 TABLET | Refills: 3 | Status: SHIPPED | OUTPATIENT
Start: 2025-03-05

## 2025-04-02 LAB
25(OH)D3+25(OH)D2 SERPL-MCNC: 27 NG/ML (ref 30–100)
ALBUMIN SERPL-MCNC: 4.2 G/DL (ref 3.6–5.1)
ALBUMIN/CREAT UR: 16 MG/G CREAT
ALBUMIN/GLOB SERPL: 1.6 (CALC) (ref 1–2.5)
ALP SERPL-CCNC: 85 U/L (ref 35–144)
ALT SERPL-CCNC: 35 U/L (ref 9–46)
ANION GAP SERPL CALCULATED.4IONS-SCNC: 12 MMOL/L (CALC) (ref 7–17)
AST SERPL-CCNC: 32 U/L (ref 10–35)
BASOPHILS # BLD AUTO: 40 CELLS/UL (ref 0–200)
BASOPHILS NFR BLD AUTO: 0.9 %
BILIRUB DIRECT SERPL-MCNC: 0.1 MG/DL
BILIRUB INDIRECT SERPL-MCNC: 0.5 MG/DL (CALC) (ref 0.2–1.2)
BILIRUB SERPL-MCNC: 0.6 MG/DL (ref 0.2–1.2)
BUN SERPL-MCNC: 14 MG/DL (ref 7–25)
BUN/CREAT SERPL: ABNORMAL (CALC) (ref 6–22)
CALCIUM SERPL-MCNC: 9.5 MG/DL (ref 8.6–10.3)
CHLORIDE SERPL-SCNC: 104 MMOL/L (ref 98–110)
CHOLEST SERPL-MCNC: 170 MG/DL
CHOLEST/HDLC SERPL: 4.1 (CALC)
CO2 SERPL-SCNC: 26 MMOL/L (ref 20–32)
CREAT SERPL-MCNC: 1.09 MG/DL (ref 0.7–1.28)
CREAT UR-MCNC: 112 MG/DL (ref 20–320)
EGFRCR SERPLBLD CKD-EPI 2021: 71 ML/MIN/1.73M2
EOSINOPHIL # BLD AUTO: 150 CELLS/UL (ref 15–500)
EOSINOPHIL NFR BLD AUTO: 3.4 %
ERYTHROCYTE [DISTWIDTH] IN BLOOD BY AUTOMATED COUNT: 14.1 % (ref 11–15)
EST. AVERAGE GLUCOSE BLD GHB EST-MCNC: 163 MG/DL
EST. AVERAGE GLUCOSE BLD GHB EST-SCNC: 9 MMOL/L
GLOBULIN SER CALC-MCNC: 2.6 G/DL (CALC) (ref 1.9–3.7)
GLUCOSE SERPL-MCNC: 125 MG/DL (ref 65–99)
HBA1C MFR BLD: 7.3 % OF TOTAL HGB
HCT VFR BLD AUTO: 45.1 % (ref 38.5–50)
HDLC SERPL-MCNC: 41 MG/DL
HGB BLD-MCNC: 14.8 G/DL (ref 13.2–17.1)
LDLC SERPL CALC-MCNC: 95 MG/DL (CALC)
LYMPHOCYTES # BLD AUTO: 832 CELLS/UL (ref 850–3900)
LYMPHOCYTES NFR BLD AUTO: 18.9 %
MCH RBC QN AUTO: 30.8 PG (ref 27–33)
MCHC RBC AUTO-ENTMCNC: 32.8 G/DL (ref 32–36)
MCV RBC AUTO: 94 FL (ref 80–100)
MICROALBUMIN UR-MCNC: 1.8 MG/DL
MONOCYTES # BLD AUTO: 541 CELLS/UL (ref 200–950)
MONOCYTES NFR BLD AUTO: 12.3 %
NEUTROPHILS # BLD AUTO: 2838 CELLS/UL (ref 1500–7800)
NEUTROPHILS NFR BLD AUTO: 64.5 %
NONHDLC SERPL-MCNC: 129 MG/DL (CALC)
PLATELET # BLD AUTO: 173 THOUSAND/UL (ref 140–400)
PMV BLD REES-ECKER: 10 FL (ref 7.5–12.5)
POTASSIUM SERPL-SCNC: 4 MMOL/L (ref 3.5–5.3)
PROT SERPL-MCNC: 6.8 G/DL (ref 6.1–8.1)
RBC # BLD AUTO: 4.8 MILLION/UL (ref 4.2–5.8)
SODIUM SERPL-SCNC: 142 MMOL/L (ref 135–146)
T4 FREE SERPL-MCNC: 1.1 NG/DL (ref 0.8–1.8)
TRIGL SERPL-MCNC: 258 MG/DL
TSH SERPL-ACNC: 4.59 MIU/L (ref 0.4–4.5)
URATE SERPL-MCNC: 4.9 MG/DL (ref 4–8)
WBC # BLD AUTO: 4.4 THOUSAND/UL (ref 3.8–10.8)

## 2025-04-04 ENCOUNTER — OFFICE VISIT (OUTPATIENT)
Dept: PRIMARY CARE | Facility: CLINIC | Age: 75
End: 2025-04-04
Payer: MEDICARE

## 2025-04-04 ENCOUNTER — TELEPHONE (OUTPATIENT)
Dept: PRIMARY CARE | Facility: CLINIC | Age: 75
End: 2025-04-04
Payer: MEDICARE

## 2025-04-04 ENCOUNTER — TELEPHONE (OUTPATIENT)
Dept: PRIMARY CARE | Facility: CLINIC | Age: 75
End: 2025-04-04

## 2025-04-04 ENCOUNTER — LAB (OUTPATIENT)
Dept: LAB | Facility: HOSPITAL | Age: 75
End: 2025-04-04
Payer: MEDICARE

## 2025-04-04 VITALS
HEIGHT: 70 IN | BODY MASS INDEX: 44.24 KG/M2 | DIASTOLIC BLOOD PRESSURE: 80 MMHG | SYSTOLIC BLOOD PRESSURE: 128 MMHG | OXYGEN SATURATION: 96 % | HEART RATE: 72 BPM | WEIGHT: 309 LBS

## 2025-04-04 DIAGNOSIS — C61 MALIGNANT NEOPLASM OF PROSTATE (MULTI): ICD-10-CM

## 2025-04-04 DIAGNOSIS — F41.9 ANXIETY: ICD-10-CM

## 2025-04-04 DIAGNOSIS — I10 PRIMARY HYPERTENSION: ICD-10-CM

## 2025-04-04 DIAGNOSIS — Z87.39 HISTORY OF GOUT: ICD-10-CM

## 2025-04-04 DIAGNOSIS — E11.9 TYPE 2 DIABETES MELLITUS WITHOUT COMPLICATION, WITHOUT LONG-TERM CURRENT USE OF INSULIN: ICD-10-CM

## 2025-04-04 DIAGNOSIS — K21.9 GASTROESOPHAGEAL REFLUX DISEASE WITHOUT ESOPHAGITIS: ICD-10-CM

## 2025-04-04 DIAGNOSIS — I25.10 CORONARY ARTERY DISEASE INVOLVING NATIVE CORONARY ARTERY OF NATIVE HEART WITHOUT ANGINA PECTORIS: ICD-10-CM

## 2025-04-04 DIAGNOSIS — G63 POLYNEUROPATHY ASSOCIATED WITH UNDERLYING DISEASE: ICD-10-CM

## 2025-04-04 DIAGNOSIS — C61 PROSTATE CANCER (MULTI): ICD-10-CM

## 2025-04-04 DIAGNOSIS — E55.9 VITAMIN D DEFICIENCY: ICD-10-CM

## 2025-04-04 DIAGNOSIS — E06.3 HYPOTHYROIDISM DUE TO HASHIMOTO THYROIDITIS: ICD-10-CM

## 2025-04-04 DIAGNOSIS — M15.0 PRIMARY OSTEOARTHRITIS INVOLVING MULTIPLE JOINTS: ICD-10-CM

## 2025-04-04 DIAGNOSIS — F32.A DEPRESSION, UNSPECIFIED DEPRESSION TYPE: ICD-10-CM

## 2025-04-04 DIAGNOSIS — N40.0 BENIGN PROSTATIC HYPERPLASIA WITHOUT LOWER URINARY TRACT SYMPTOMS: ICD-10-CM

## 2025-04-04 DIAGNOSIS — E78.2 MIXED HYPERLIPIDEMIA: ICD-10-CM

## 2025-04-04 DIAGNOSIS — C61 MALIGNANT NEOPLASM OF PROSTATE (MULTI): Primary | ICD-10-CM

## 2025-04-04 DIAGNOSIS — E11.9 TYPE 2 DIABETES MELLITUS WITHOUT COMPLICATION, WITHOUT LONG-TERM CURRENT USE OF INSULIN: Primary | ICD-10-CM

## 2025-04-04 DIAGNOSIS — Z87.442 HISTORY OF KIDNEY STONES: ICD-10-CM

## 2025-04-04 DIAGNOSIS — I67.9 CEREBROVASCULAR DISEASE: ICD-10-CM

## 2025-04-04 DIAGNOSIS — R53.81 PHYSICAL DEBILITY: ICD-10-CM

## 2025-04-04 DIAGNOSIS — G47.33 OSA (OBSTRUCTIVE SLEEP APNEA): ICD-10-CM

## 2025-04-04 DIAGNOSIS — I25.10 CORONARY ARTERY DISEASE INVOLVING NATIVE CORONARY ARTERY OF NATIVE HEART WITHOUT ANGINA PECTORIS: Primary | ICD-10-CM

## 2025-04-04 DIAGNOSIS — Z95.2 HISTORY OF TRANSCATHETER AORTIC VALVE REPLACEMENT (TAVR): ICD-10-CM

## 2025-04-04 PROBLEM — R23.2 HOT FLASHES: Status: RESOLVED | Noted: 2024-10-04 | Resolved: 2025-04-04

## 2025-04-04 LAB
ALBUMIN SERPL BCP-MCNC: 4.3 G/DL (ref 3.4–5)
ALP SERPL-CCNC: 87 U/L (ref 33–136)
ALT SERPL W P-5'-P-CCNC: 44 U/L (ref 10–52)
ANION GAP SERPL CALCULATED.3IONS-SCNC: 12 MMOL/L (ref 10–20)
AST SERPL W P-5'-P-CCNC: 43 U/L (ref 9–39)
BASOPHILS # BLD AUTO: 0.04 X10*3/UL (ref 0–0.1)
BASOPHILS NFR BLD AUTO: 1 %
BILIRUB SERPL-MCNC: 0.8 MG/DL (ref 0–1.2)
BUN SERPL-MCNC: 15 MG/DL (ref 6–23)
CALCIUM SERPL-MCNC: 9.9 MG/DL (ref 8.6–10.3)
CHLORIDE SERPL-SCNC: 103 MMOL/L (ref 98–107)
CO2 SERPL-SCNC: 30 MMOL/L (ref 21–32)
CREAT SERPL-MCNC: 1.17 MG/DL (ref 0.5–1.3)
EGFRCR SERPLBLD CKD-EPI 2021: 65 ML/MIN/1.73M*2
EOSINOPHIL # BLD AUTO: 0.14 X10*3/UL (ref 0–0.4)
EOSINOPHIL NFR BLD AUTO: 3.3 %
ERYTHROCYTE [DISTWIDTH] IN BLOOD BY AUTOMATED COUNT: 14.6 % (ref 11.5–14.5)
GLUCOSE SERPL-MCNC: 122 MG/DL (ref 74–99)
HCT VFR BLD AUTO: 47.6 % (ref 41–52)
HGB BLD-MCNC: 15.3 G/DL (ref 13.5–17.5)
HOLD SPECIMEN: NORMAL
IMM GRANULOCYTES # BLD AUTO: 0.01 X10*3/UL (ref 0–0.5)
IMM GRANULOCYTES NFR BLD AUTO: 0.2 % (ref 0–0.9)
LYMPHOCYTES # BLD AUTO: 0.9 X10*3/UL (ref 0.8–3)
LYMPHOCYTES NFR BLD AUTO: 21.4 %
MCH RBC QN AUTO: 30.3 PG (ref 26–34)
MCHC RBC AUTO-ENTMCNC: 32.1 G/DL (ref 32–36)
MCV RBC AUTO: 94 FL (ref 80–100)
MONOCYTES # BLD AUTO: 0.55 X10*3/UL (ref 0.05–0.8)
MONOCYTES NFR BLD AUTO: 13.1 %
NEUTROPHILS # BLD AUTO: 2.57 X10*3/UL (ref 1.6–5.5)
NEUTROPHILS NFR BLD AUTO: 61 %
NRBC BLD-RTO: 0 /100 WBCS (ref 0–0)
PLATELET # BLD AUTO: 179 X10*3/UL (ref 150–450)
POTASSIUM SERPL-SCNC: 4.5 MMOL/L (ref 3.5–5.3)
PROT SERPL-MCNC: 6.8 G/DL (ref 6.4–8.2)
RBC # BLD AUTO: 5.05 X10*6/UL (ref 4.5–5.9)
SODIUM SERPL-SCNC: 140 MMOL/L (ref 136–145)
WBC # BLD AUTO: 4.2 X10*3/UL (ref 4.4–11.3)

## 2025-04-04 PROCEDURE — 80053 COMPREHEN METABOLIC PANEL: CPT

## 2025-04-04 PROCEDURE — 3079F DIAST BP 80-89 MM HG: CPT | Performed by: STUDENT IN AN ORGANIZED HEALTH CARE EDUCATION/TRAINING PROGRAM

## 2025-04-04 PROCEDURE — 3074F SYST BP LT 130 MM HG: CPT | Performed by: STUDENT IN AN ORGANIZED HEALTH CARE EDUCATION/TRAINING PROGRAM

## 2025-04-04 PROCEDURE — 99214 OFFICE O/P EST MOD 30 MIN: CPT | Performed by: STUDENT IN AN ORGANIZED HEALTH CARE EDUCATION/TRAINING PROGRAM

## 2025-04-04 PROCEDURE — 36415 COLL VENOUS BLD VENIPUNCTURE: CPT

## 2025-04-04 PROCEDURE — 1126F AMNT PAIN NOTED NONE PRSNT: CPT | Performed by: STUDENT IN AN ORGANIZED HEALTH CARE EDUCATION/TRAINING PROGRAM

## 2025-04-04 PROCEDURE — 85025 COMPLETE CBC W/AUTO DIFF WBC: CPT

## 2025-04-04 PROCEDURE — 1159F MED LIST DOCD IN RCRD: CPT | Performed by: STUDENT IN AN ORGANIZED HEALTH CARE EDUCATION/TRAINING PROGRAM

## 2025-04-04 PROCEDURE — 84403 ASSAY OF TOTAL TESTOSTERONE: CPT

## 2025-04-04 PROCEDURE — G2211 COMPLEX E/M VISIT ADD ON: HCPCS | Performed by: STUDENT IN AN ORGANIZED HEALTH CARE EDUCATION/TRAINING PROGRAM

## 2025-04-04 PROCEDURE — 84153 ASSAY OF PSA TOTAL: CPT

## 2025-04-04 PROCEDURE — 1160F RVW MEDS BY RX/DR IN RCRD: CPT | Performed by: STUDENT IN AN ORGANIZED HEALTH CARE EDUCATION/TRAINING PROGRAM

## 2025-04-04 PROCEDURE — 1157F ADVNC CARE PLAN IN RCRD: CPT | Performed by: STUDENT IN AN ORGANIZED HEALTH CARE EDUCATION/TRAINING PROGRAM

## 2025-04-04 PROCEDURE — 1036F TOBACCO NON-USER: CPT | Performed by: STUDENT IN AN ORGANIZED HEALTH CARE EDUCATION/TRAINING PROGRAM

## 2025-04-04 RX ORDER — TIRZEPATIDE 2.5 MG/.5ML
2.5 INJECTION, SOLUTION SUBCUTANEOUS WEEKLY
Qty: 2 ML | Refills: 0 | Status: SHIPPED | OUTPATIENT
Start: 2025-04-04

## 2025-04-04 RX ORDER — METFORMIN HYDROCHLORIDE 500 MG/1
500 TABLET, EXTENDED RELEASE ORAL
Status: SHIPPED
Start: 2025-04-04 | End: 2026-04-04

## 2025-04-04 RX ORDER — ACETAMINOPHEN 500 MG
2000 TABLET ORAL DAILY
COMMUNITY
Start: 2025-04-04

## 2025-04-04 ASSESSMENT — ENCOUNTER SYMPTOMS
CONSTITUTIONAL NEGATIVE: 1
RESPIRATORY NEGATIVE: 1
GASTROINTESTINAL NEGATIVE: 1
CARDIOVASCULAR NEGATIVE: 1

## 2025-04-04 ASSESSMENT — PAIN SCALES - GENERAL: PAINLEVEL_OUTOF10: 0-NO PAIN

## 2025-04-04 NOTE — PROGRESS NOTES
Oncology Visit     Primary Care Provider: MD Jeremi Espinoza N Rad onc    Cancer History  Prostate Cancer - Unfav Intd  Treatment  -elevated PSA, I PSA 6.63, MRI 11/23 - lesion in right transition zone, right peripheral zone, non specific focus in left peripheral zone, Biopsy 12/29/23 -lesions 3+4 equal 7 grade group 2 involving 4 cores, [place] 4+3 grade group 3 involving 3 cores, PET PSMA -focally increased expression within the prostate gland right greater than left mild hypermetabolic activity within small bilateral inguinal lymph nodes SUV 3.1  / no other mets,reviewed in TB and inguinal nodes thought reactive not c/w mets  -refused trial, and decision for eligard  3/2024  -XRT to prostate completed 3/29/24    Other contributing history  TAVR, HLD, HTN,  DM, Gout, Hypothyroid, ED visit 1/24 - MRI possible NPH , Basal Cell carcinoma, hot flashes, CAD    HPI  Overall doing well without any other major new symptoms appetite and energy remain stable improvement of hot flashes most likely related to recovery of testosterone but has been taking the Neurontin also.  Denies any chest pain or chest tightness.  Appetite and energy is otherwise well.    ROS:  10-pt ROS reviewed and negative except as mentioned above.    Medications: below was reviewed and is accurate  Current Outpatient Medications   Medication Instructions    acetaminophen (TYLENOL) 1,000 mg, oral, 3 times daily PRN    aspirin 81 mg, oral, Daily    cholecalciferol (VITAMIN D3) 50 mcg, oral, Daily    gabapentin (NEURONTIN) 300 mg, oral, Nightly    hydroCHLOROthiazide (MICROZIDE) 12.5 mg, oral, Daily    levothyroxine (SYNTHROID, LEVOXYL) 150 mcg, oral, Daily before breakfast    metFORMIN XR (GLUCOPHAGE-XR) 500 mg, oral, 2 times daily (morning and late afternoon)    mirabegron (MYRBETRIQ) 50 mg, oral, Daily    Mounjaro 2.5 mg, subcutaneous, Weekly    NIFEdipine ER (ADALAT CC) 60 mg,  oral, Daily with breakfast    omeprazole (PRILOSEC) 40 mg, oral, Every 48 hours    PARoxetine (PAXIL) 20 mg, oral, Every morning    simvastatin (ZOCOR) 10 mg, oral, Daily        Past Medical/ Social / Surgical / Family history reviewed    Physical exam:  Vitals:    04/07/25 1059   BP: 125/77   BP Location: Right arm   Patient Position: Sitting   BP Cuff Size: Large adult long   Pulse: 82   Resp: 18   Temp: 36.4 °C (97.5 °F)   TempSrc: Temporal   SpO2: 97%   Weight: 140 kg (308 lb 5 oz)           GEN: NAD, well-appearing  HEENT: no clear lesions seen  SKIN: no concerning new lesions,  LUNGS: CTA 2/6 HSM  CV: RRR no clear R/G  ABD: Soft, NTND. No rebound or guarding.  EXT: Symmetric lower extremity edema - minimal improvement  NEURO: Grossly intact. No focal deficits.  PSYCH: Normal mood and affect    Radiology review  Reviewed in detail personally and for detail see results in EPIC        Genomics Data    Laboratory review  Reviewed in detail personally and for detail see results in EPIC  Lab Results   Component Value Date    WBC 4.2 (L) 04/04/2025    HGB 15.3 04/04/2025    HCT 47.6 04/04/2025    MCV 94 04/04/2025     04/04/2025     Lab Results   Component Value Date    GLUCOSE 122 (H) 04/04/2025    CALCIUM 9.9 04/04/2025     04/04/2025    K 4.5 04/04/2025    CO2 30 04/04/2025     04/04/2025    BUN 15 04/04/2025    CREATININE 1.17 04/04/2025     Lab Results   Component Value Date    PSA <0.10 04/04/2025    PSA <0.10 12/23/2024    PSA <0.10 09/16/2024     Lab Results   Component Value Date    ALT 44 04/04/2025    AST 43 (H) 04/04/2025    ALKPHOS 87 04/04/2025    BILITOT 0.8 04/04/2025     Lab Results   Component Value Date    TESTOSTERONE 287 04/04/2025         ASSESSMENT AND PLAN     Prostate Cancer -s/p ADT 6months, and s/p XRT, at this point PSA remains suppressed recovery of testosterone hot flashes have improved most likely due to recovery of testosterone discussed tapering off the Neurontin  and he was agreeable he would like to follow-up with his primary care physician in regards to monitoring I did reach out to his primary care physician will get blood work in 3 months and if physician is agreeable to follow-up then with us as needed.  Monitoring blood work every 6 months.    Leukopenia - borderline monitor and to follow-up with primary care physician     Borderline LFTs - monitor nd can follow-up with primary care physician    Discussed the role of exercise and diet      Stan Mahmood MD  Senior Attending Physician/  in Medicine [facility] [employer]  [facility] / [facility]  Patient line [phone]  Fax [phone]

## 2025-04-04 NOTE — TELEPHONE ENCOUNTER
Patient doesn't qualify for [facility] PAP but would still like to start Mounjaro. I will follow up in a month to see how it is going and potentially increase dose. See pending order, thanks!

## 2025-04-04 NOTE — PROGRESS NOTES
[facility]: [facility]  PROGRESS NOTE      Jerald Sandoval is a 74 y.o. male that is presenting today for Follow-up.    Assessment/Plan   - Patient notes that, since his last visit, he has been dealing with some balance issues and has actually had two mechanical falls. Refer to physical therapy. Patient already working with a  at the gym.  - Otherwise, the patient feels well and denies any acute symptoms / concerns at this time.  - Blood pressure at goal today.  - Encouraged continued dietary, exercise, and lifestyle modification.  - Encouraged continued use of CPAP.  - Significant medication and problem list reconciliation done today.     - Labwork:   - Patient had labwork done for this appointment. Discussed today.   - Will order labwork for the patient's next appointment. Encouraged the patient to get this labwork done one week prior to the next appointment.    Diagnoses and all orders for this visit:  Coronary artery disease involving native coronary artery of native heart without angina pectoris  -     Follow Up In Primary Care  Type 2 diabetes mellitus without complication, without long-term current use of insulin  -     Follow Up In Primary Care  -     metFORMIN  mg 24 hr tablet; Take 1 tablet (500 mg) by mouth 2 times daily (morning and late afternoon).  -     Hemoglobin A1C; Future  Benign prostatic hyperplasia without lower urinary tract symptoms  -     Follow Up In Primary Care  History of transcatheter aortic valve replacement (TAVR)  -     Follow Up In Primary Care  Polyneuropathy associated with underlying disease  -     Follow Up In Primary Care  Gastroesophageal reflux disease without esophagitis  -     Follow Up In Primary Care  Primary osteoarthritis involving multiple joints  -     Follow Up In Primary Care  -     Referral to Physical Therapy; Future  Hypothyroidism due to Hashimoto thyroiditis  -     Follow Up In Primary Care  -     TSH with reflex to  Free T4 if abnormal; Future  Depression, unspecified depression type  -     Follow Up In Primary Care  Malignant neoplasm of prostate (Multi)  -     Follow Up In Primary Care  TODD (obstructive sleep apnea)  -     Follow Up In Primary Care  History of kidney stones  -     Follow Up In Primary Care  Cerebrovascular disease  -     Follow Up In Primary Care  Mixed hyperlipidemia  -     Follow Up In Primary Care  Primary hypertension  -     Follow Up In Primary Care  -     Comprehensive Metabolic Panel; Future  -     CBC and Auto Differential; Future  Vitamin D deficiency  -     Follow Up In Primary Care  -     cholecalciferol (Vitamin D3) 50 mcg (2,000 unit) capsule; Take 1 capsule (50 mcg) by mouth once daily.  -     Vitamin D 25-Hydroxy,Total (for eval of Vitamin D levels); Future  Physical debility  -     Referral to Physical Therapy; Future  History of gout  -     Follow Up In Primary Care  -     Referral to Physical Therapy; Future  Anxiety    Current Outpatient Medications   Medication Instructions    acetaminophen (TYLENOL) 1,000 mg, oral, 3 times daily PRN    aspirin 81 mg, oral, Daily    cholecalciferol (VITAMIN D3) 50 mcg, oral, Daily    gabapentin (NEURONTIN) 300 mg, oral, Nightly    hydroCHLOROthiazide (MICROZIDE) 12.5 mg, oral, Daily    levothyroxine (SYNTHROID, LEVOXYL) 150 mcg, oral, Daily before breakfast    metFORMIN XR (GLUCOPHAGE-XR) 500 mg, oral, 2 times daily (morning and late afternoon)    mirabegron (MYRBETRIQ) 50 mg, oral, Daily    NIFEdipine ER (ADALAT CC) 60 mg, oral, Daily with breakfast    omeprazole (PRILOSEC) 40 mg, oral, Every 48 hours    PARoxetine (PAXIL) 20 mg, oral, Every morning    simvastatin (ZOCOR) 10 mg, oral, Daily     Subjective   - The patient otherwise feels well and denies any acute symptoms or concerns at this time.  - The patient denies any changes or progression of their chronic medical problems.  - The patient denies any problems or concerns with their  "medications.      Review of Systems   Constitutional: Negative.    Respiratory: Negative.     Cardiovascular: Negative.    Gastrointestinal: Negative.    All other systems reviewed and are negative.     Objective   Vitals:    04/04/25 1100   BP: 128/80   Pulse: 72   SpO2: 96%      Body mass index is 44.74 kg/m².  Physical Exam  Vitals and nursing note reviewed.   Constitutional:       General: He is not in acute distress.  Neck:      Vascular: No carotid bruit.   Cardiovascular:      Rate and Rhythm: Normal rate and regular rhythm.      Heart sounds: Normal heart sounds.   Pulmonary:      Effort: Pulmonary effort is normal.      Breath sounds: Normal breath sounds.   Musculoskeletal:         General: No swelling.   Neurological:      Mental Status: He is alert. Mental status is at baseline.   Psychiatric:         Mood and Affect: Mood normal.       Diagnostic Results   Lab Results   Component Value Date    GLUCOSE 125 (H) 04/01/2025    CALCIUM 9.5 04/01/2025     04/01/2025    K 4.0 04/01/2025    CO2 26 04/01/2025     04/01/2025    BUN 14 04/01/2025    CREATININE 1.09 04/01/2025     Lab Results   Component Value Date    ALT 35 04/01/2025    AST 32 04/01/2025    ALKPHOS 85 04/01/2025    BILITOT 0.6 04/01/2025     Lab Results   Component Value Date    WBC 4.4 04/01/2025    HGB 14.8 04/01/2025    HCT 45.1 04/01/2025    MCV 94.0 04/01/2025     04/01/2025     Lab Results   Component Value Date    CHOL 170 04/01/2025    CHOL 131 03/14/2024    CHOL 158 09/14/2023     Lab Results   Component Value Date    HDL 41 04/01/2025    HDL 35.7 03/14/2024    HDL 41 09/14/2023     Lab Results   Component Value Date    LDLCALC 95 04/01/2025    LDLCALC 59 03/14/2024    LDLCALC 84 09/14/2023     Lab Results   Component Value Date    TRIG 258 (H) 04/01/2025    TRIG 183 (H) 03/14/2024    TRIG 165 (H) 09/14/2023     No components found for: \"CHOLHDL\"  Lab Results   Component Value Date    HGBA1C 7.3 (H) 04/01/2025 " "    Other labs not included in the list above were reviewed either before or during this encounter.    History    Past Medical History:   Diagnosis Date    Coronary artery disease     Diabetes mellitus (Multi)     \"pre-diabetic\" per pt    Gout     Heart valve disease     heart valve replacement in February 2023    History of stomach ulcers     d/t daily aleve per pt    History of transcatheter aortic valve replacement (TAVR)     History of transcatheter aortic valve replacement (TAVR)     Hyperlipemia     Hypertension     Hypothyroidism     LBBB (left bundle branch block)     Obstructive sleep apnea treated with continuous positive airway pressure (CPAP)     Personal history of irradiation     Personal history of irradiation     Prostate cancer (Multi)     Sleep apnea      Past Surgical History:   Procedure Laterality Date    AORTIC VALVE REPLACEMENT N/A     CARDIAC CATHETERIZATION  1/10/2023    CARPAL TUNNEL RELEASE Bilateral     ROTATOR CUFF REPAIR Bilateral     THUMB AMPUTATION Left     d/t trauma     Family History   Problem Relation Name Age of Onset    Heart disease Mother LEONIDAS Sandoval     Hypertension Mother LEONIDAS Sandoval     Lung cancer Father Lucio [place]     Heart disease Father Lucio LORNAShraddha Lori     Other (Other) Father Lucio COVARRUBIAS Lori         CAB x5    Cancer Father Lucio COVARRUBIAS Lori     Lung cancer Brother Lucio RODERICKShraddha Sandoval     Cancer Brother Lucio Sandoval     Heart attack Son      Heart attack Son       Social History     Socioeconomic History    Marital status:      Spouse name: Not on file    Number of children: Not on file    Years of education: Not on file    Highest education level: Not on file   Occupational History    Not on file   Tobacco Use    Smoking status: Never     Passive exposure: Past    Smokeless tobacco: Never   Vaping Use    Vaping status: Never Used   Substance and Sexual Activity    Alcohol use: Yes     Alcohol/week: 3.0 standard drinks of alcohol     Types: 1 Cans " "of beer, 1 Shots of liquor, 1 Standard drinks or equivalent per week     Comment: Social; not every week    Drug use: Never    Sexual activity: Not Currently     Partners: Female   Other Topics Concern    Not on file   Social History Narrative    Not on file     Social Drivers of Health     Financial Resource Strain: Low Risk  (3/11/2024)    Overall Financial Resource Strain (CARDIA)     Difficulty of Paying Living Expenses: Not hard at all   Food Insecurity: Unknown (3/11/2024)    Hunger Vital Sign     Worried About Running Out of Food in the Last Year: Never true     Ran Out of Food in the Last Year: Not on file   Transportation Needs: No Transportation Needs (3/11/2024)    PRAPARE - Transportation     Lack of Transportation (Medical): No     Lack of Transportation (Non-Medical): No   Physical Activity: Not on file   Stress: No Stress Concern Present (3/11/2024)    [employer] - Occupational Stress Questionnaire     Feeling of Stress : Not at all   Social Connections: Not on file   Intimate Partner Violence: Not on file   Housing Stability: Unknown (3/11/2024)    Housing Stability Vital Sign     Unable to Pay for Housing in the Last Year: No     Number of Places Lived in the Last Year: Not on file     Unstable Housing in the Last Year: No     Allergies   Allergen Reactions    Cephalosporins Rash     \"Skin peels off\"    Penicillin Rash     \"Skin peels off\"     Current Outpatient Medications on File Prior to Visit   Medication Sig Dispense Refill    acetaminophen (Tylenol) 500 mg tablet Take 2 tablets (1,000 mg) by mouth 3 times a day as needed for mild pain (1 - 3), moderate pain (4 - 6) or fever (temp greater than 38.0 C).      aspirin 81 mg chewable tablet Chew 1 tablet (81 mg) once daily.      gabapentin (Neurontin) 300 mg capsule Take 1 capsule (300 mg) by mouth once daily at bedtime. 90 capsule 3    hydroCHLOROthiazide (Microzide) 12.5 mg tablet TAKE 1 TABLET DAILY 90 tablet 3    " levothyroxine (Synthroid, Levoxyl) 150 mcg tablet Take 1 tablet (150 mcg) by mouth once daily in the morning. Take before meals. 90 tablet 3    mirabegron (Myrbetriq) 50 mg tablet extended release 24 hr 24 hr tablet Take 1 tablet (50 mg) by mouth once daily. 30 tablet 11    NIFEdipine ER (NIFEdipine CC) 60 mg 24 hr tablet Take 1 tablet (60 mg) by mouth once daily with breakfast. 90 tablet 3    omeprazole (PriLOSEC) 40 mg DR capsule Take 1 capsule (40 mg) by mouth every other day. 45 capsule 3    PARoxetine (Paxil) 20 mg tablet Take 1 tablet (20 mg) by mouth once daily in the morning. 90 tablet 3    simvastatin (Zocor) 10 mg tablet Take 1 tablet (10 mg) by mouth once daily. 90 tablet 3    [DISCONTINUED] calcium carbonate-vitamin D3 500 mg-5 mcg (200 unit) tablet Take 2 tablets by mouth once daily.      [DISCONTINUED] metFORMIN  mg 24 hr tablet Take 1 tablet (500 mg) by mouth once daily with breakfast. 90 tablet 3     No current facility-administered medications on file prior to visit.     Immunization History   Administered Date(s) Administered    COVID-19, mRNA, LNP-S, PF, 30 mcg/0.3 mL dose 12/08/2021    Flu vaccine, quadrivalent, high-dose, preservative free, age 65y+ (FLUZONE) 10/24/2021, 09/13/2023    Flu vaccine, trivalent, preservative free, HIGH-DOSE, age 65y+ (Fluzone) 10/05/2015, 11/29/2016, 09/07/2017, 09/14/2018, 10/01/2019, 09/13/2023    Flu vaccine, trivalent, preservative free, age 6 months and greater (Fluarix/Fluzone/Flulaval) 12/23/2013    Hepatitis B vaccine, 19 yrs and under (RECOMBIVAX, ENGERIX) 01/01/1988    Influenza, Seasonal, Quadrivalent, Adjuvanted 10/05/2020, 09/27/2022    Influenza, seasonal, injectable 12/17/2012, 10/16/2013, 10/05/2015    Influenza, trivalent, adjuvanted 09/28/2024    Pfizer COVID-19 vaccine, 12 years and older, (30mcg/0.3mL) (Comirnaty) 10/18/2024    Pfizer Purple Cap SARS-CoV-2 03/11/2021, 04/08/2021    Pneumococcal conjugate vaccine, 13-valent (PREVNAR 13)  11/29/2016, 07/05/2018    Pneumococcal conjugate vaccine, 20-valent (PREVNAR 20) 10/04/2024    RESPIRATORY SYNCYTIAL VIRUS (RSV), ELIGIBLE PREGNANT PTS, 0.5 ML (ABRYSVO) 10/18/2024    Td vaccine, age 7 years and older (TDVAX) 10/01/2008    Tdap vaccine, age 7 year and older (BOOSTRIX, ADACEL) 12/09/2024    Zoster vaccine, recombinant, adult (SHINGRIX) 09/18/2023, 11/18/2023    Zoster, live 02/17/2017     Patient's medical history was reviewed and updated either before or during this encounter.       Jimmie Rogers MD

## 2025-04-05 LAB
PSA SERPL-MCNC: <0.1 NG/ML
TESTOST SERPL-MCNC: 287 NG/DL (ref 240–1000)

## 2025-04-07 ENCOUNTER — OFFICE VISIT (OUTPATIENT)
Dept: HEMATOLOGY/ONCOLOGY | Facility: CLINIC | Age: 75
End: 2025-04-07
Payer: MEDICARE

## 2025-04-07 VITALS
DIASTOLIC BLOOD PRESSURE: 77 MMHG | TEMPERATURE: 97.5 F | OXYGEN SATURATION: 97 % | WEIGHT: 308.31 LBS | HEART RATE: 82 BPM | BODY MASS INDEX: 44.64 KG/M2 | SYSTOLIC BLOOD PRESSURE: 125 MMHG | RESPIRATION RATE: 18 BRPM

## 2025-04-07 DIAGNOSIS — C61 PROSTATE CANCER (MULTI): ICD-10-CM

## 2025-04-07 DIAGNOSIS — C61 MALIGNANT NEOPLASM OF PROSTATE (MULTI): ICD-10-CM

## 2025-04-07 PROCEDURE — 3074F SYST BP LT 130 MM HG: CPT | Performed by: INTERNAL MEDICINE

## 2025-04-07 PROCEDURE — 99214 OFFICE O/P EST MOD 30 MIN: CPT | Performed by: INTERNAL MEDICINE

## 2025-04-07 PROCEDURE — 3078F DIAST BP <80 MM HG: CPT | Performed by: INTERNAL MEDICINE

## 2025-04-07 PROCEDURE — 1159F MED LIST DOCD IN RCRD: CPT | Performed by: INTERNAL MEDICINE

## 2025-04-07 PROCEDURE — 1125F AMNT PAIN NOTED PAIN PRSNT: CPT | Performed by: INTERNAL MEDICINE

## 2025-04-07 PROCEDURE — 1157F ADVNC CARE PLAN IN RCRD: CPT | Performed by: INTERNAL MEDICINE

## 2025-04-07 ASSESSMENT — PAIN SCALES - GENERAL: PAINLEVEL_OUTOF10: 3

## 2025-04-07 NOTE — PROGRESS NOTES
Patient here alone for follow up with Dr. Mahmood; seen for prostate cancer.    On surveillance.    Reconciled and reviewed medication and allergy list with patient.     4/1/25 labs reviewed with patient by MD today.    Patient has no pain at this time.     Patient is eating well and without difficulty; will be starting on Mounjaro tomorrow to help with weight loss.    Per MD, patient to have labs in 3 months and will be following up with his PCP.     Reminded patient to check My Chart for any updates to scheduling and to review medication list against what  has at home.  Also reviewed patient can obtain lab results on My Chart which will be reviewed at follow up visits.     No barriers to education noted, pt. Agreed to plan and verbalized understanding using teach back method

## 2025-04-07 NOTE — PROGRESS NOTES
Reviewed relevance of monitoring PSA and Testosterone levels as part of plan of care and monitoring effectives of treatments.

## 2025-04-25 DIAGNOSIS — E06.3 HYPOTHYROIDISM DUE TO HASHIMOTO'S THYROIDITIS: ICD-10-CM

## 2025-04-25 RX ORDER — LEVOTHYROXINE SODIUM 150 UG/1
150 TABLET ORAL
Qty: 90 TABLET | Refills: 3 | Status: SHIPPED | OUTPATIENT
Start: 2025-04-25

## 2025-04-28 DIAGNOSIS — K21.9 GASTROESOPHAGEAL REFLUX DISEASE WITHOUT ESOPHAGITIS: ICD-10-CM

## 2025-04-28 RX ORDER — OMEPRAZOLE 40 MG/1
40 CAPSULE, DELAYED RELEASE ORAL EVERY OTHER DAY
Qty: 45 CAPSULE | Refills: 3 | Status: SHIPPED | OUTPATIENT
Start: 2025-04-28

## 2025-04-29 ENCOUNTER — APPOINTMENT (OUTPATIENT)
Dept: PHARMACY | Facility: HOSPITAL | Age: 75
End: 2025-04-29
Payer: MEDICARE

## 2025-04-29 DIAGNOSIS — E11.9 TYPE 2 DIABETES MELLITUS WITHOUT COMPLICATION, WITHOUT LONG-TERM CURRENT USE OF INSULIN: ICD-10-CM

## 2025-04-29 DIAGNOSIS — E11.9 TYPE 2 DIABETES MELLITUS WITHOUT COMPLICATION, WITHOUT LONG-TERM CURRENT USE OF INSULIN: Primary | ICD-10-CM

## 2025-04-29 RX ORDER — TIRZEPATIDE 5 MG/.5ML
5 INJECTION, SOLUTION SUBCUTANEOUS WEEKLY
Qty: 2 ML | Refills: 8 | Status: SHIPPED | OUTPATIENT
Start: 2025-04-29

## 2025-04-29 NOTE — PROGRESS NOTES
Patient is sent at the request of Jimmie Rogers MD for my opinion regarding Type 2 diabetes.  My final recommendations will be communicated back to the requesting provider by way of shared medical record.    Subjective   HPI    Current diet: well balanced  Current exercise: walking        Allergies[1]    Current monitoring regimen:       Adverse Effects:   Patient states he has noticed his vision has been blurry, specifically when golfing it looks like the grass is moving  -states the blurry vision started before Mounjaro but noticed the grass blurriness after starting Mounjaro      Objective     There were no vitals taken for this visit.    Diabetes Pharmacotherapy:    Metformin Er 500 mg BID  Mounjaro 2.5 mg subQ once weekly  -Tuesdays  -4th dose 4/29    Secondary Prevention:   - Statin? Yes  - ACE-I/ARB? No  - Aspirin? Yes        Lab Review  Lab Results   Component Value Date    BILITOT 0.8 04/04/2025    CALCIUM 9.9 04/04/2025    CO2 30 04/04/2025     04/04/2025    CREATININE 1.17 04/04/2025    GLUCOSE 122 (H) 04/04/2025    ALKPHOS 87 04/04/2025    K 4.5 04/04/2025    PROT 6.8 04/04/2025     04/04/2025    AST 43 (H) 04/04/2025    ALT 44 04/04/2025    BUN 15 04/04/2025    ANIONGAP 12 04/04/2025    MG 2.01 02/18/2023    PHOS 3.5 02/18/2023    ALBUMIN 4.3 04/04/2025    GFRMALE 61 03/10/2023     Lab Results   Component Value Date    TRIG 258 (H) 04/01/2025    CHOL 170 04/01/2025    LDLCALC 95 04/01/2025    HDL 41 04/01/2025     Lab Results   Component Value Date    HGBA1C 7.3 (H) 04/01/2025    HGBA1C 6.4 (H) 09/27/2024    HGBA1C 6.1 (H) 03/14/2024     The 10-year ASCVD risk score (Winter YING, et al., 2019) is: 46.5%    Values used to calculate the score:      Age: 74 years      Sex: Male      Is Non- : No      Diabetic: Yes      Tobacco smoker: No      Systolic Blood Pressure: 125 mmHg      Is BP treated: Yes      HDL Cholesterol: 41 mg/dL      Total Cholesterol: 170  "mg/dL      Assessment/Plan   Problem List Items Addressed This Visit       T2DM (type 2 diabetes mellitus) (Multi)       Patients diabetes is borderline controlled with most recent A1c of 7.3% (Goal < 7%). Patient reports he has lost around 5 lbs since starting Mounjaro, has noticed a decrease in appetite and has been able to make diet changes.   Increase: Mounjaro to 5 mg subQ once weekly  Continue: Metformin Er 500 mg BID    Compliance at present is estimated to be excellent.   Education Provided to Patient:   Mounjaro Education:   - Counseled patient on MOA, expectations, side effects, duration of therapy, contraindications, administration, and monitoring parameters  - Answered all patient questions and concerns  - Reviewed Mounjaro titration schedule, starting with 2.5 mg once weekly to a goal of 15 mg once weekly if tolerated  - Counseled patient on the benefits of GLP-1ra glycemic control and weight loss  - Advised patient that they may experience improved satiety after meals and portion sizes of meals may be reduced as doses of Mounjaro increase.    In terms of his vision changes, discussed with PCP, is most likely due to his macular degeneration and not due to Mounjaro side effects (diabetic neuropathy) but discussed with patient to call eye doctor and let them know the symptoms and that he started mounjaro and get an appointment to follow up. Will continue mounjaro but will closely monitor and discontinue if it does appear to be diabetic neuropathy     Follow-up: I recommend diabetes care be PRN.  PCP Follow-Up: 6 months    Qing Fitch, PharmD, BCPS    Continue all meds under the continuation of care with the referring provider and clinical pharmacy team.         [1]   Allergies  Allergen Reactions    Cephalosporins Rash     \"Skin peels off\"    Penicillin Rash     \"Skin peels off\"     "

## 2025-04-30 RX ORDER — METFORMIN HYDROCHLORIDE 500 MG/1
500 TABLET, EXTENDED RELEASE ORAL
Qty: 180 TABLET | Refills: 3 | Status: SHIPPED | OUTPATIENT
Start: 2025-04-30 | End: 2026-04-30

## 2025-05-05 DIAGNOSIS — I10 PRIMARY HYPERTENSION: ICD-10-CM

## 2025-05-05 RX ORDER — NIFEDIPINE 60 MG/1
60 TABLET, EXTENDED RELEASE ORAL
Qty: 90 TABLET | Refills: 3 | Status: SHIPPED | OUTPATIENT
Start: 2025-05-05

## 2025-05-12 ENCOUNTER — OFFICE VISIT (OUTPATIENT)
Dept: CARDIOLOGY | Facility: CLINIC | Age: 75
End: 2025-05-12
Payer: MEDICARE

## 2025-05-12 VITALS
WEIGHT: 298 LBS | HEART RATE: 64 BPM | RESPIRATION RATE: 16 BRPM | OXYGEN SATURATION: 96 % | BODY MASS INDEX: 43.15 KG/M2 | SYSTOLIC BLOOD PRESSURE: 130 MMHG | DIASTOLIC BLOOD PRESSURE: 86 MMHG

## 2025-05-12 DIAGNOSIS — I10 PRIMARY HYPERTENSION: Primary | ICD-10-CM

## 2025-05-12 DIAGNOSIS — Z95.2 HISTORY OF TRANSCATHETER AORTIC VALVE REPLACEMENT (TAVR): ICD-10-CM

## 2025-05-12 DIAGNOSIS — I25.10 CORONARY ARTERY DISEASE INVOLVING NATIVE CORONARY ARTERY OF NATIVE HEART WITHOUT ANGINA PECTORIS: ICD-10-CM

## 2025-05-12 PROCEDURE — 99214 OFFICE O/P EST MOD 30 MIN: CPT | Performed by: INTERNAL MEDICINE

## 2025-05-12 PROCEDURE — 1036F TOBACCO NON-USER: CPT | Performed by: INTERNAL MEDICINE

## 2025-05-12 PROCEDURE — 3075F SYST BP GE 130 - 139MM HG: CPT | Performed by: INTERNAL MEDICINE

## 2025-05-12 PROCEDURE — 1159F MED LIST DOCD IN RCRD: CPT | Performed by: INTERNAL MEDICINE

## 2025-05-12 PROCEDURE — 3079F DIAST BP 80-89 MM HG: CPT | Performed by: INTERNAL MEDICINE

## 2025-05-12 PROCEDURE — 1126F AMNT PAIN NOTED NONE PRSNT: CPT | Performed by: INTERNAL MEDICINE

## 2025-05-12 RX ORDER — ATORVASTATIN CALCIUM 40 MG/1
40 TABLET, FILM COATED ORAL DAILY
Qty: 90 TABLET | Refills: 3 | Status: SHIPPED | OUTPATIENT
Start: 2025-05-12 | End: 2026-05-12

## 2025-05-12 ASSESSMENT — ENCOUNTER SYMPTOMS
DYSPNEA ON EXERTION: 0
DEPRESSION: 0
COUGH: 0
WEAKNESS: 0
SHORTNESS OF BREATH: 0
WEIGHT GAIN: 0
CLAUDICATION: 0
SYNCOPE: 0
FEVER: 0
LOSS OF SENSATION IN FEET: 0
PALPITATIONS: 0
DIZZINESS: 0
NEAR-SYNCOPE: 0
DIAPHORESIS: 0
IRREGULAR HEARTBEAT: 0
OCCASIONAL FEELINGS OF UNSTEADINESS: 0
MYALGIAS: 0
ORTHOPNEA: 0
PND: 0
WHEEZING: 0
WEIGHT LOSS: 0

## 2025-05-12 ASSESSMENT — PAIN SCALES - GENERAL: PAINLEVEL_OUTOF10: 0-NO PAIN

## 2025-05-12 ASSESSMENT — LIFESTYLE VARIABLES
AUDIT-C TOTAL SCORE: 2
HOW OFTEN DO YOU HAVE A DRINK CONTAINING ALCOHOL: 2-4 TIMES A MONTH
HAVE YOU OR SOMEONE ELSE BEEN INJURED AS A RESULT OF YOUR DRINKING: NO
HOW OFTEN DO YOU HAVE SIX OR MORE DRINKS ON ONE OCCASION: NEVER
AUDIT TOTAL SCORE: 2
HOW MANY STANDARD DRINKS CONTAINING ALCOHOL DO YOU HAVE ON A TYPICAL DAY: 1 OR 2
SKIP TO QUESTIONS 9-10: 1
HAS A RELATIVE, FRIEND, DOCTOR, OR ANOTHER HEALTH PROFESSIONAL EXPRESSED CONCERN ABOUT YOUR DRINKING OR SUGGESTED YOU CUT DOWN: NO

## 2025-05-12 NOTE — ASSESSMENT & PLAN NOTE
Mild non-obstructive. Continue statin. Goal LDL is <70. I would suggest rotating to atorvastatin 40mg.

## 2025-05-12 NOTE — PROGRESS NOTES
Subjective      Chief Complaint   Patient presents with    [facility]     New Pt, Previous pt of Dr. Newell, [facility] TAVR, HTN, Hyperlipidemia        74-year-old male with history of severe aortic stenosis status post TAVR in 2023 (34mm Evolut FX) presents for follow-up.  He is a former patient of Dr. Newell. He golfs, works in the yard. His knees limit him         Review of Systems   Constitutional: Negative for diaphoresis, fever, weight gain and weight loss.   Eyes:  Negative for visual disturbance.   Cardiovascular:  Negative for chest pain, claudication, dyspnea on exertion, irregular heartbeat, leg swelling, near-syncope, orthopnea, palpitations, paroxysmal nocturnal dyspnea and syncope.   Respiratory:  Negative for cough, shortness of breath and wheezing.    Musculoskeletal:  Negative for muscle weakness and myalgias.   Neurological:  Negative for dizziness and weakness.   All other systems reviewed and are negative.       Medical History[1]     Surgical History[2]     Social History     Socioeconomic History    Marital status:      Spouse name: Not on file    Number of children: Not on file    Years of education: Not on file    Highest education level: Not on file   Occupational History    Not on file   Tobacco Use    Smoking status: Never     Passive exposure: Past    Smokeless tobacco: Never   Vaping Use    Vaping status: Never Used   Substance and Sexual Activity    Alcohol use: Yes     Alcohol/week: 3.0 standard drinks of alcohol     Types: 1 Cans of beer, 1 Shots of liquor, 1 Standard drinks or equivalent per week     Comment: Social; not every week    Drug use: Never    Sexual activity: Not Currently     Partners: Female   Other Topics Concern    Not on file   Social History Narrative    Not on file     Social Drivers of Health     Financial Resource Strain: Low Risk  (3/11/2024)    Overall Financial Resource Strain (CARDIA)     Difficulty of Paying Living Expenses: Not hard at all   Food  Insecurity: Unknown (3/11/2024)    Hunger Vital Sign     Worried About Running Out of Food in the Last Year: Never true     Ran Out of Food in the Last Year: Not on file   Transportation Needs: No Transportation Needs (3/11/2024)    PRAPARE - Transportation     Lack of Transportation (Medical): No     Lack of Transportation (Non-Medical): No   Physical Activity: Not on file   Stress: No Stress Concern Present (3/11/2024)    [employer] - Occupational Stress Questionnaire     Feeling of Stress : Not at all   Social Connections: Not on file   Intimate Partner Violence: Not on file   Housing Stability: Unknown (3/11/2024)    Housing Stability Vital Sign     Unable to Pay for Housing in the Last Year: No     Number of Places Lived in the Last Year: Not on file     Unstable Housing in the Last Year: No        Family History[3]     OBJECTIVE:    Vitals:    05/12/25 0929   BP: 130/86   Pulse: 64   Resp: 16   SpO2: 96%        Vitals reviewed.   Constitutional:       Appearance: Normal and healthy appearance. Not in distress.   Pulmonary:      Effort: Pulmonary effort is normal.      Breath sounds: Normal breath sounds.   Cardiovascular:      Normal rate. Regular rhythm. Normal S1. Normal S2.       Murmurs: There is a grade 2/6 early systolic murmur at the URSB.      No gallop.  No click.   Pulses:     Intact distal pulses.   Edema:     Peripheral edema absent.   Skin:     General: Skin is warm and dry.   Neurological:      General: No focal deficit present.          Lab Review:   Lab Results   Component Value Date     04/04/2025     04/01/2025    K 4.5 04/04/2025    K 4.0 04/01/2025     04/04/2025     04/01/2025    CO2 30 04/04/2025    CO2 26 04/01/2025    BUN 15 04/04/2025    BUN 14 04/01/2025    CREATININE 1.17 04/04/2025    CREATININE 1.09 04/01/2025    GLUCOSE 122 (H) 04/04/2025    GLUCOSE 125 (H) 04/01/2025    CALCIUM 9.9 04/04/2025    CALCIUM 9.5 04/01/2025     Lab  "Results   Component Value Date    CHOL 170 04/01/2025    TRIG 258 (H) 04/01/2025    HDL 41 04/01/2025       Lab Results   Component Value Date    LDLCALC 95 04/01/2025        HTN (hypertension)  Controlled. Does not check at home. Continue losartan and nifedipine. Advised to check at home 2-3x/week    CAD (coronary artery disease)  Mild non-obstructive. Continue statin. Goal LDL is <70. I would suggest rotating to atorvastatin 40mg.     History of transcatheter aortic valve replacement (TAVR)  Stable s/p TAVR. Echo 2024 shows acceptable gradients            [1]   Past Medical History:  Diagnosis Date    Coronary artery disease     Diabetes mellitus (Multi)     \"pre-diabetic\" per pt    Gout     Heart valve disease     heart valve replacement in February 2023    History of stomach ulcers     d/t daily aleve per pt    History of transcatheter aortic valve replacement (TAVR)     History of transcatheter aortic valve replacement (TAVR)     Hyperlipemia     Hypertension     Hypothyroidism     LBBB (left bundle branch block)     Obstructive sleep apnea treated with continuous positive airway pressure (CPAP)     Personal history of irradiation     Personal history of irradiation     Prostate cancer (Multi)     Sleep apnea    [2]   Past Surgical History:  Procedure Laterality Date    AORTIC VALVE REPLACEMENT N/A     CARDIAC CATHETERIZATION  1/10/2023    CARPAL TUNNEL RELEASE Bilateral     ROTATOR CUFF REPAIR Bilateral     THUMB AMPUTATION Left     d/t trauma   [3]   Family History  Problem Relation Name Age of Onset    Heart disease Mother LEONIDAS Cais     Hypertension Mother LEONIDAS [place]     Lung cancer Father Lucio Sandoval     Heart disease Father Lucio Sandoval     Other (Other) Father Lucio Sandoval         CAB x5    Cancer Father Lucio Sandoval     Lung cancer Brother Lucio Sandoval     Cancer Brother Lucio Sandoval     Heart attack Son      Heart attack Son       "

## 2025-05-12 NOTE — ASSESSMENT & PLAN NOTE
Controlled. Does not check at home. Continue losartan and nifedipine. Advised to check at home 2-3x/week

## 2025-05-26 DIAGNOSIS — Z87.442 HISTORY OF KIDNEY STONES: ICD-10-CM

## 2025-05-26 DIAGNOSIS — Z87.39 HISTORY OF GOUT: ICD-10-CM

## 2025-05-27 RX ORDER — ALLOPURINOL 300 MG/1
300 TABLET ORAL DAILY
Qty: 90 TABLET | Refills: 3 | Status: SHIPPED | OUTPATIENT
Start: 2025-05-27

## 2025-06-20 NOTE — PROGRESS NOTES
Patient is a 74 y.o. male presenting for follow up with [facility] of prostate cancer and urinary urgency.    SUBJECTIVE:  HPI   He has history of [place] 7 prostate cancer, treated with radiation which finished in March 2024. He was also treated with ADT. His last PSA was undetectable in 4/25.  He states that he has muscle weakness that is improving since ADT.  He can have times of urgency if he has water running. He has mild occasional urge incontinence, worse in the evening. He is not bothered by his symptoms.  He continues Myrbetriq.  He denies hematuria or dysuria.   He has history of urinary frequency, presenting after radiation, which has improved.    Medical History[1]  Surgical History[2]    Review of Systems  All systems have been reviewed and are negative unless otherwise noted in the HPI.    OBJECTIVE:  There were no vitals taken for this visit.    Physical Exam   Constitutional: No obvious distress.  Cardiovascular: Extremities are warm and well perfused.  Respiratory: No audible wheezing/stridor; respirations do not appear labored.  Neurologic: Alert and oriented x3.  Genitourinary: No CVA tenderness, bladder not palpable.     Labs:  Lab Results   Component Value Date    WBC 4.2 (L) 04/04/2025    WBC 4.4 04/01/2025    HGB 15.3 04/04/2025    HGB 14.8 04/01/2025    HCT 47.6 04/04/2025    HCT 45.1 04/01/2025    MCV 94 04/04/2025    MCV 94.0 04/01/2025     04/04/2025     04/01/2025    GLUCOSE 122 (H) 04/04/2025    GLUCOSE 125 (H) 04/01/2025    CALCIUM 9.9 04/04/2025    CALCIUM 9.5 04/01/2025     04/04/2025     04/01/2025    K 4.5 04/04/2025    K 4.0 04/01/2025    CO2 30 04/04/2025    CO2 26 04/01/2025     04/04/2025     04/01/2025    BUN 15 04/04/2025    BUN 14 04/01/2025    CREATININE 1.17 04/04/2025    CREATININE 1.09 04/01/2025    EGFR 65 04/04/2025    EGFR 71 04/01/2025    URICACID 4.9 04/01/2025    PSA <0.10 04/04/2025       IMAGING:  NM PET CT prostate  Result date:  1/30/2024  IMPRESSION:  1.    Focally increased PSMA expression within the  prostate gland  bilaterally, right more than left, consistent with the known primary  prostatic malignancy.  2.     Mild hypermetabolic activity within small bilateral inguinal  lymph nodes, potentially early metastatic disease; correlate  clinically and follow-up as needed. No enlarged lymph nodes seen.  3.     There is no evidence for  PSMA-avid metastatic disease in the  neck, chest and abdomen or in the osseous structures.    PROCEDURES:  PVR 0 mL  6/23/25    ASSESSMENT/PLAN:  Problem List Items Addressed This Visit       Malignant neoplasm of prostate (Multi) - Primary    Urinary symptom or sign    Relevant Orders    Measure post void residual (Completed)    POCT UA Automated manually resulted (Completed)    Urinary frequency     He has history of [place] 7 prostate cancer treated with radiation which finished in March 2024. His PSA was 6.1, and prostate volume was 28g. He received one dose of ADT. He had mild pelvic activity on PMSA scan. His last PSA was undetectable in April 2025. Repeat PSA in July 2025.  PSA summary  04/04/2025 <0.10  12/23/2024 <0.10  09/16/2024 <0.10    He has history of urinary frequency since radiation treatment, which improved.    He also has history of urinary urgency, treated with Myrbetriq. He is emptying completely today. He does have persistent urgency,  He will continue Myrbetriq.    Urinalysis is negative today (6/23/25).    RTC in 4 months with PSA prior.     All questions were answered to the patient’s satisfaction.  Patient agrees with the plan and wishes to proceed.  Follow-up will be scheduled appropriately.     Scribe Attestation  By signing my name below, I, Kylee Live,   attest that this documentation has been prepared under the direction and in the presence of Jeremi Gonzalez MD.          [1]   Past Medical History:  Diagnosis Date    Coronary artery disease     Diabetes mellitus  "(Multi)     \"pre-diabetic\" per pt    Gout     Heart valve disease     heart valve replacement in February 2023    History of stomach ulcers     d/t daily aleve per pt    History of transcatheter aortic valve replacement (TAVR)     History of transcatheter aortic valve replacement (TAVR)     Hyperlipemia     Hypertension     Hypothyroidism     LBBB (left bundle branch block)     Obstructive sleep apnea treated with continuous positive airway pressure (CPAP)     Personal history of irradiation     Personal history of irradiation     Prostate cancer (Multi)     Sleep apnea    [2]   Past Surgical History:  Procedure Laterality Date    AORTIC VALVE REPLACEMENT N/A     CARDIAC CATHETERIZATION  1/10/2023    CARPAL TUNNEL RELEASE Bilateral     ROTATOR CUFF REPAIR Bilateral     THUMB AMPUTATION Left     d/t trauma     "

## 2025-06-23 ENCOUNTER — APPOINTMENT (OUTPATIENT)
Dept: UROLOGY | Facility: CLINIC | Age: 75
End: 2025-06-23
Payer: MEDICARE

## 2025-06-23 DIAGNOSIS — R39.15 URINARY URGENCY: ICD-10-CM

## 2025-06-23 DIAGNOSIS — R39.9 URINARY SYMPTOM OR SIGN: ICD-10-CM

## 2025-06-23 DIAGNOSIS — C61 MALIGNANT NEOPLASM OF PROSTATE (MULTI): Primary | ICD-10-CM

## 2025-06-23 DIAGNOSIS — R35.0 URINARY FREQUENCY: ICD-10-CM

## 2025-06-23 LAB
POC APPEARANCE, URINE: CLEAR
POC BILIRUBIN, URINE: NEGATIVE
POC BLOOD, URINE: NEGATIVE
POC COLOR, URINE: YELLOW
POC GLUCOSE, URINE: NEGATIVE MG/DL
POC KETONES, URINE: NEGATIVE MG/DL
POC LEUKOCYTES, URINE: NEGATIVE
POC NITRITE,URINE: NEGATIVE
POC PH, URINE: 6 PH
POC PROTEIN, URINE: NEGATIVE MG/DL
POC SPECIFIC GRAVITY, URINE: 1.01
POC UROBILINOGEN, URINE: 0.2 EU/DL

## 2025-06-23 PROCEDURE — 1160F RVW MEDS BY RX/DR IN RCRD: CPT | Performed by: UROLOGY

## 2025-06-23 PROCEDURE — G2211 COMPLEX E/M VISIT ADD ON: HCPCS | Performed by: UROLOGY

## 2025-06-23 PROCEDURE — 1159F MED LIST DOCD IN RCRD: CPT | Performed by: UROLOGY

## 2025-06-23 PROCEDURE — 1036F TOBACCO NON-USER: CPT | Performed by: UROLOGY

## 2025-06-23 PROCEDURE — 99214 OFFICE O/P EST MOD 30 MIN: CPT | Performed by: UROLOGY

## 2025-06-23 PROCEDURE — 1126F AMNT PAIN NOTED NONE PRSNT: CPT | Performed by: UROLOGY

## 2025-06-23 PROCEDURE — 81003 URINALYSIS AUTO W/O SCOPE: CPT | Performed by: UROLOGY

## 2025-06-23 ASSESSMENT — PAIN SCALES - GENERAL: PAINLEVEL_OUTOF10: 0-NO PAIN

## 2025-06-30 LAB — PSA SERPL-MCNC: 0.1 NG/ML

## 2025-07-05 NOTE — PROGRESS NOTES
Oncology Visit     Primary Care Provider: MD Jeremi Espinoza N Rad onc    Cancer History  Prostate Cancer - Unfav Intd  Treatment  -elevated PSA, I PSA 6.63, MRI 11/23 - lesion in right transition zone, right peripheral zone, non specific focus in left peripheral zone, Biopsy 12/29/23 -lesions 3+4 equal 7 grade group 2 involving 4 cores, Matteson's 4+3 grade group 3 involving 3 cores, PET PSMA -focally increased expression within the prostate gland right greater than left mild hypermetabolic activity within small bilateral inguinal lymph nodes SUV 3.1  / no other mets,reviewed in TB and inguinal nodes thought reactive not c/w mets  -refused trial, and decision for eligard  3/2024  -XRT to prostate completed 3/29/24    Other contributing history  TAVR, HLD, HTN,  DM, Gout, Hypothyroid, ED visit 1/24 - MRI possible NPH , Basal Cell carcinoma, hot flashes, CAD    HPI  Patient is without any major new symptoms.  Denies any worsening issues with nausea, vomiting, fevers, chills, easy bruising or bleeding denies any issues with chest pain or chest tightness appetite and energy is otherwise well denies any other worsening urinary symptoms.    ROS:  10-pt ROS reviewed and negative except as mentioned above.    Medications: below was reviewed and is accurate  Current Outpatient Medications   Medication Instructions    acetaminophen (TYLENOL) 1,000 mg, oral, 3 times daily PRN    allopurinol (ZYLOPRIM) 300 mg, oral, Daily    aspirin 81 mg, oral, Daily    atorvastatin (LIPITOR) 40 mg, oral, Daily    cholecalciferol (VITAMIN D3) 50 mcg, oral, Daily    gabapentin (NEURONTIN) 300 mg, oral, Nightly    hydroCHLOROthiazide (MICROZIDE) 12.5 mg, oral, Daily    levothyroxine (SYNTHROID, LEVOXYL) 150 mcg, oral, Daily before breakfast    metFORMIN XR (GLUCOPHAGE-XR) 500 mg, oral, 2 times daily (morning and late afternoon)    mirabegron (MYRBETRIQ) 50 mg, oral, Daily     Mounjaro 5 mg, subcutaneous, Weekly    NIFEdipine ER (ADALAT CC) 60 mg, oral, Daily with breakfast    omeprazole (PRILOSEC) 40 mg, oral, Every other day    PARoxetine (PAXIL) 20 mg, oral, Every morning        Past Medical/ Social / Surgical / Family history reviewed    Physical exam:  Vitals:    07/07/25 1221   BP: 125/71   BP Location: Left arm   Patient Position: Sitting   BP Cuff Size: Adult long   Pulse: 72   Resp: 18   Temp: 36 °C (96.8 °F)   TempSrc: Temporal   SpO2: 96%   Weight: 131 kg (288 lb 0.5 oz)       GEN: NAD, well-appearing  HEENT: no clear lesions seen  SKIN: no concerning new lesions,  LUNGS: CTA 2/6 HSM  CV: RRR no clear R/G  ABD: Soft, NTND. No rebound or guarding.  EXT: Symmetric lower extremity edema - minimal improvement  NEURO: Grossly intact. No focal deficits.  PSYCH: Normal mood and affect    Radiology review  Reviewed in detail personally and for detail see results in EPIC        Genomics Data    Laboratory review  Reviewed in detail personally and for detail see results in EPIC  Lab Results   Component Value Date    WBC 4.2 (L) 04/04/2025    HGB 15.3 04/04/2025    HCT 47.6 04/04/2025    MCV 94 04/04/2025     04/04/2025     Lab Results   Component Value Date    GLUCOSE 122 (H) 04/04/2025    CALCIUM 9.9 04/04/2025     04/04/2025    K 4.5 04/04/2025    CO2 30 04/04/2025     04/04/2025    BUN 15 04/04/2025    CREATININE 1.17 04/04/2025     Lab Results   Component Value Date    PSA 0.10 06/30/2025    PSA <0.10 04/04/2025    PSA <0.10 12/23/2024     Lab Results   Component Value Date    ALT 44 04/04/2025    AST 43 (H) 04/04/2025    ALKPHOS 87 04/04/2025    BILITOT 0.8 04/04/2025     Lab Results   Component Value Date    TESTOSTERONE 287 04/04/2025         ASSESSMENT AND PLAN     Prostate Cancer - recovery of testosterone, PSA is measurable but still not anywhere close to biochemical failure explained [place] criteria and at this point we will just monitor closely and hold  off on intervention and arrange follow-up in 3 months and he will contact us for any other new complaints.  Blood work ordered and he will follow-up with the team here    Leukopenia - borderline monitor and to follow-up with primary care physician and we will repeat in 3 months.    Borderline LFTs - monitor can follow-up with primary care physician and we will repeat in 3 months    Discussed the role of exercise and diet    LUTS - continue to f/up with urology      Stan Mahmood MD  Senior Attending Physician/  in Medicine [facility] [employer]   Malignancies  [facility] / [facility]  Patient line [phone]  Fax [phone]

## 2025-07-07 ENCOUNTER — OFFICE VISIT (OUTPATIENT)
Dept: HEMATOLOGY/ONCOLOGY | Facility: CLINIC | Age: 75
End: 2025-07-07
Payer: MEDICARE

## 2025-07-07 VITALS
BODY MASS INDEX: 41.7 KG/M2 | RESPIRATION RATE: 18 BRPM | HEART RATE: 72 BPM | OXYGEN SATURATION: 96 % | TEMPERATURE: 96.8 F | SYSTOLIC BLOOD PRESSURE: 125 MMHG | WEIGHT: 288.03 LBS | DIASTOLIC BLOOD PRESSURE: 71 MMHG

## 2025-07-07 DIAGNOSIS — C61 MALIGNANT NEOPLASM OF PROSTATE (MULTI): ICD-10-CM

## 2025-07-07 DIAGNOSIS — C61 PROSTATE CANCER (MULTI): Primary | ICD-10-CM

## 2025-07-07 PROCEDURE — 3074F SYST BP LT 130 MM HG: CPT | Performed by: INTERNAL MEDICINE

## 2025-07-07 PROCEDURE — 1126F AMNT PAIN NOTED NONE PRSNT: CPT | Performed by: INTERNAL MEDICINE

## 2025-07-07 PROCEDURE — 99214 OFFICE O/P EST MOD 30 MIN: CPT | Performed by: INTERNAL MEDICINE

## 2025-07-07 PROCEDURE — 3078F DIAST BP <80 MM HG: CPT | Performed by: INTERNAL MEDICINE

## 2025-07-07 PROCEDURE — 1159F MED LIST DOCD IN RCRD: CPT | Performed by: INTERNAL MEDICINE

## 2025-07-07 ASSESSMENT — PAIN SCALES - GENERAL: PAINLEVEL_OUTOF10: 0-NO PAIN

## 2025-07-07 NOTE — PROGRESS NOTES
Patient here alone for follow up with Dr. Mahmood; seen for prostate cancer.    On surveillance.    Reconciled and reviewed medication and allergy list with patient.     Patient has no pain at this time.     Patient is eating well and without difficulty     6/30/25 labs reviewed by MD with patient today.    Per MD, patient to return in 3 months with NP, having labs drawn several days prior to visit    Reminded patient to check My Chart for any updates to scheduling and to review medication list against what  has at home.  Also reviewed patient can obtain lab results on My Chart which will be reviewed at follow up visits.     Education Documentation  Tumor Markers, taught by Tess Flores RN at 7/7/2025  2:06 PM.  Learner: Patient  Readiness: Acceptance  Method: Explanation  Response: Verbalizes Understanding    Comprehensive Metabolic Panel (CMP), taught by Tess Flores RN at 7/7/2025  2:06 PM.  Learner: Patient  Readiness: Acceptance  Method: Explanation  Response: Verbalizes Understanding    Complete Blood Count with Differential (CBC w/ Diff), taught by Tess Flores RN at 7/7/2025  2:06 PM.  Learner: Patient  Readiness: Acceptance  Method: Explanation  Response: Verbalizes Understanding    Treatment Plan and Schedule, taught by Tess Flores RN at 7/7/2025  2:06 PM.  Learner: Patient  Readiness: Acceptance  Method: Explanation  Response: Verbalizes Understanding    When and How to Contact Clinic, taught by Tess Flores RN at 7/7/2025  2:06 PM.  Learner: Patient  Readiness: Acceptance  Method: Explanation  Response: Verbalizes Understanding    Education Comments  07/07/25 0688 Tess Flores RN  Reviewed relevance of monitoring PSA and Testosterone levels as part of plan of care and monitoring effectives of treatments.     Our contact information was given to patient and they were encouraged to contact us with any questions or concerns.       No barriers to education noted, pt. Agreed to plan and verbalized understanding using  teach back method

## 2025-07-14 ENCOUNTER — TELEPHONE (OUTPATIENT)
Dept: CARDIOLOGY | Facility: CLINIC | Age: 75
End: 2025-07-14
Payer: MEDICARE

## 2025-07-14 NOTE — TELEPHONE ENCOUNTER
The patient is scheduled for a membrane peel in the left eye on 08/28/2025. Could you please addend the note from the office visit on 05/12 to include the cardiovascular risk assessment in preparation for the upcoming procedure? Kindly advise  
Include Summary: yes
Letter Template: Shanique

## 2025-08-04 ENCOUNTER — HOSPITAL ENCOUNTER (OUTPATIENT)
Dept: RADIATION ONCOLOGY | Facility: CLINIC | Age: 75
Setting detail: RADIATION/ONCOLOGY SERIES
Discharge: HOME | End: 2025-08-04
Payer: MEDICARE

## 2025-08-04 VITALS
TEMPERATURE: 96.8 F | HEART RATE: 88 BPM | WEIGHT: 284.5 LBS | BODY MASS INDEX: 41.19 KG/M2 | RESPIRATION RATE: 18 BRPM | DIASTOLIC BLOOD PRESSURE: 67 MMHG | OXYGEN SATURATION: 96 % | SYSTOLIC BLOOD PRESSURE: 114 MMHG

## 2025-08-04 DIAGNOSIS — C61 MALIGNANT NEOPLASM OF PROSTATE (MULTI): ICD-10-CM

## 2025-08-04 PROCEDURE — 99213 OFFICE O/P EST LOW 20 MIN: CPT | Performed by: RADIOLOGY

## 2025-08-04 ASSESSMENT — ENCOUNTER SYMPTOMS
OCCASIONAL FEELINGS OF UNSTEADINESS: 0
LOSS OF SENSATION IN FEET: 0
DEPRESSION: 0

## 2025-08-04 ASSESSMENT — PAIN SCALES - GENERAL: PAINLEVEL_OUTOF10: 0-NO PAIN

## 2025-08-04 NOTE — PROGRESS NOTES
Radiation Oncology Follow-Up    Patient Name:  Jerald Sandoval  MRN:  [id number]  :  1950    Referring Provider: Miguel Ángel Grayson MD  Primary Care Provider: Jimmie Rogers MD  Care Team: Patient Care Team:  Jimmie Rogers MD as PCP - General (Internal Medicine)  Jimmie Rogers MD as PCP - [employer] PCP  Stan Mahmood MD as Consulting Physician (Hematology and Oncology)    Date of Service: 2025    SUBJECTIVE  History of Present Illness:  Jerald Sandoval is a 74 y.o. male who was previously seen at the [facility] Department of Radiation Oncology for protate ca.    Treatment Rendered:   IMRT: Midline Prostate    Treatment Period Technique Fraction Dose Fractions Total Dose   Course 1 3/4/2024-3/29/2024  (days elapsed: 25)         Prostate/SV 3/4/2024-3/29/2024 VMAT 300 / 300 cGy  6,000 / 6,000 cGy       He reports for routine follow up.  IPSS 10, mostly from urgency. He does not want any meds. He thinks the urgency started after he had a catheter with Dr Gonzalez. No GI symptoms.     Lab Results   Component Value Date    PSA 0.10 2025    PSA <0.10 2025    PSA <0.10 2024         Review of Systems:   Review of Systems - Oncology    Performance Status:   The Karnofsky performance scale today is 100, Fully active, able to carry on all pre-disease performed without restriction (ECOG equivalent 0).   General: Negative for weight changes, night sweats, fever, recent trauma.   Eyes: Negative for blurriness, eye pain, blind spots.   Ears, nose, mouth, throat: Negative for changes in hearing, changes in taste, sore throat, mouth sores.  Cardiovascular: Negative for palpitations, chest pain, tightness.   Pulmonary: Negative for shortness of breath, cough, sputum.   Gastrointestinal: Negative for indigestion, diarrhea, constipation, abdominal pain, blood in stool, nausea/vomiting.   Genitourinary: Per HPI.   Neuro: Negative for  numbness, tingling, weakness, changes in speech.   Musculoskeletal: Negative for muscle pain, joint pain, edema, tenderness.   Endocrine: Negative for hot flashes.   Other: All others negative.      OBJECTIVE  Vital Signs:  /67   Pulse 88   Temp 36 °C (96.8 °F) (Temporal)   Resp 18   Wt 129 kg (284 lb 8.1 oz)   SpO2 96%   BMI 41.19 kg/m²   Physical Exam   General: The patient appears well and is in no acute distress.   Cognition: Acceptable understanding of the essential elements of the medical situation.   Speech: Fluent and coherent.   Eyes: No conjunctival infection. Anicteric. Extraocular movements intact.   Head, ears, nose, mouth, throat: Atraumatic. Moist mucous membranes. Trachea midline.   Lymphatic: No visible cervical lymphadenopathy.   Cardiovascular: No visible jugular venous distention. Skin perfused.   Pulmonary: Breathes comfortably on room air without stridor or cough.   Abdomen: Nondistended. Obese.   Extremities: No edema or muscle wasting.   Neurologic: Cranial nerves II-XII are grossly intact. Moves all extremities. No gross focal deficits.   Psychiatric: Mood is appropriate.   Skin: No visible rash or ulcers.       ASSESSMENT:   Jerald Sandoval is a 74 y.o. male with prostate ca UIR, sp RT and short course ADT.    PLAN:    The patient is now 1 year 4 mos status post completion of radiation therapy and is doing well without clinical evidence of disease.      As long as his PSA stays at < ~2 ng/mL, we would consider him to be disease free.    The patient has no apparent chronic toxicity secondary to radiation therapy.  I offered flomax for his IPSS but he does not think the symptoms are bothersome.    I recommend the patient follow-up with our team in 12 months virtually. We scheduled this appointment today.    The patient will see Dr Gonzalez in the interim.    The time spent in follow up was 20 minutes.    Miguel Ángel Grayson MD MS  Department of Radiation Oncology

## 2025-08-04 NOTE — PROGRESS NOTES
Radiation Oncology Nursing Note    Pain: The patient's current pain level was assessed.  They report currently having a pain of 0 out of 10.  They feel their pain is under control without the use of pain medications.    Review of Systems:  Review of Systems - Oncology    Patient is in today for follow up visit with Dr Grayson. His last radiation treatment was 3/2024. Today the patient denies pain, urinary retention, and hematuria. He reports urinary frequency of 2 hours, urgency, incontinence, and waking 1 time in the night to urinate. He states his bowel movements are every 1 to 3 days and he is not sexually active. He will see Alberto on 10/10 and Carlos 10/27. Patient will follow up with this office with Esau Jackson in 1 year with PSA.     Education Documentation  When and How to Contact Clinic, taught by Armida Verde RN at 8/4/2025 11:54 AM.  Learner: Patient  Readiness: Acceptance  Method: Explanation  Response: Verbalizes Understanding    Oriented to Facility, taught by Armida Verde RN at 8/4/2025 11:54 AM.  Learner: Patient  Readiness: Acceptance  Method: Explanation  Response: Verbalizes Understanding    Education Comments  No comments found.

## 2025-08-08 ENCOUNTER — APPOINTMENT (OUTPATIENT)
Dept: RADIATION ONCOLOGY | Facility: CLINIC | Age: 75
End: 2025-08-08
Payer: MEDICARE

## 2025-08-11 ENCOUNTER — APPOINTMENT (OUTPATIENT)
Dept: PRIMARY CARE | Facility: CLINIC | Age: 75
End: 2025-08-11
Payer: MEDICARE

## 2025-10-10 ENCOUNTER — APPOINTMENT (OUTPATIENT)
Dept: HEMATOLOGY/ONCOLOGY | Facility: CLINIC | Age: 75
End: 2025-10-10
Payer: MEDICARE

## 2025-10-27 ENCOUNTER — APPOINTMENT (OUTPATIENT)
Dept: UROLOGY | Facility: CLINIC | Age: 75
End: 2025-10-27
Payer: MEDICARE

## (undated) DEVICE — Device

## (undated) DEVICE — NEEDLE, BIOPSY, MAX CORE, 18G

## (undated) DEVICE — GOWN, SURGICAL, SIRUS, NON REINFORCED, LARGE

## (undated) DEVICE — GLOVE, PROTEXIS PI CLASSIC, SZ-7.5, PF, LF

## (undated) DEVICE — DRESSING, NON-ADHERENT, CURAD, ABSORBENT, 3 X 8 IN, STERILE

## (undated) DEVICE — SOLUTION, IRRIGATION, X RX SODIUM CHL 0.9%, 1000ML BTL